# Patient Record
Sex: MALE | Race: WHITE | Employment: OTHER | URBAN - METROPOLITAN AREA
[De-identification: names, ages, dates, MRNs, and addresses within clinical notes are randomized per-mention and may not be internally consistent; named-entity substitution may affect disease eponyms.]

---

## 2017-01-01 ENCOUNTER — E-VISIT (OUTPATIENT)
Dept: FAMILY MEDICINE | Facility: CLINIC | Age: 82
End: 2017-01-01
Payer: COMMERCIAL

## 2017-01-01 ENCOUNTER — MYC MEDICAL ADVICE (OUTPATIENT)
Dept: FAMILY MEDICINE | Facility: CLINIC | Age: 82
End: 2017-01-01

## 2017-01-01 ENCOUNTER — OFFICE VISIT (OUTPATIENT)
Dept: OTHER | Facility: CLINIC | Age: 82
End: 2017-01-01
Attending: SURGERY
Payer: MEDICARE

## 2017-01-01 ENCOUNTER — PRE VISIT (OUTPATIENT)
Dept: UROLOGY | Facility: CLINIC | Age: 82
End: 2017-01-01

## 2017-01-01 ENCOUNTER — HOSPITAL ENCOUNTER (EMERGENCY)
Facility: CLINIC | Age: 82
Discharge: HOME | End: 2017-12-12
Attending: EMERGENCY MEDICINE | Admitting: EMERGENCY MEDICINE
Payer: MEDICARE

## 2017-01-01 ENCOUNTER — TELEPHONE (OUTPATIENT)
Dept: PHARMACY | Facility: CLINIC | Age: 82
End: 2017-01-01

## 2017-01-01 ENCOUNTER — OFFICE VISIT (OUTPATIENT)
Dept: SURGERY | Facility: CLINIC | Age: 82
End: 2017-01-01

## 2017-01-01 ENCOUNTER — OFFICE VISIT (OUTPATIENT)
Dept: URGENT CARE | Facility: URGENT CARE | Age: 82
End: 2017-01-01
Payer: COMMERCIAL

## 2017-01-01 ENCOUNTER — HOSPITAL ENCOUNTER (OUTPATIENT)
Facility: CLINIC | Age: 82
Discharge: HOME | End: 2017-12-08
Attending: UROLOGY | Admitting: UROLOGY
Payer: MEDICARE

## 2017-01-01 ENCOUNTER — HOSPITAL ENCOUNTER (OUTPATIENT)
Dept: ULTRASOUND IMAGING | Facility: CLINIC | Age: 82
Discharge: HOME | End: 2017-06-06
Attending: SURGERY | Admitting: SURGERY
Payer: MEDICARE

## 2017-01-01 ENCOUNTER — TELEPHONE (OUTPATIENT)
Dept: CARDIOLOGY | Facility: CLINIC | Age: 82
End: 2017-01-01

## 2017-01-01 ENCOUNTER — MYC MEDICAL ADVICE (OUTPATIENT)
Dept: CARDIOLOGY | Facility: CLINIC | Age: 82
End: 2017-01-01

## 2017-01-01 ENCOUNTER — HOSPITAL ENCOUNTER (EMERGENCY)
Facility: CLINIC | Age: 82
Discharge: HOME | End: 2017-12-19
Attending: EMERGENCY MEDICINE | Admitting: EMERGENCY MEDICINE
Payer: MEDICARE

## 2017-01-01 ENCOUNTER — CARE COORDINATION (OUTPATIENT)
Dept: ONCOLOGY | Facility: CLINIC | Age: 82
End: 2017-01-01

## 2017-01-01 ENCOUNTER — OFFICE VISIT (OUTPATIENT)
Dept: FAMILY MEDICINE | Facility: CLINIC | Age: 82
End: 2017-01-01
Payer: COMMERCIAL

## 2017-01-01 ENCOUNTER — RADIANT APPOINTMENT (OUTPATIENT)
Dept: GENERAL RADIOLOGY | Facility: CLINIC | Age: 82
End: 2017-01-01
Attending: NURSE PRACTITIONER
Payer: COMMERCIAL

## 2017-01-01 ENCOUNTER — ANESTHESIA EVENT (OUTPATIENT)
Dept: SURGERY | Facility: CLINIC | Age: 82
End: 2017-01-01
Payer: MEDICARE

## 2017-01-01 ENCOUNTER — ONCOLOGY VISIT (OUTPATIENT)
Dept: ONCOLOGY | Facility: CLINIC | Age: 82
End: 2017-01-01
Attending: INTERNAL MEDICINE
Payer: COMMERCIAL

## 2017-01-01 ENCOUNTER — TELEPHONE (OUTPATIENT)
Dept: PHARMACY | Facility: OTHER | Age: 82
End: 2017-01-01

## 2017-01-01 ENCOUNTER — CARE COORDINATION (OUTPATIENT)
Dept: UROLOGY | Facility: CLINIC | Age: 82
End: 2017-01-01

## 2017-01-01 ENCOUNTER — APPOINTMENT (OUTPATIENT)
Dept: CARDIOLOGY | Facility: CLINIC | Age: 82
DRG: 698 | End: 2017-01-01
Payer: MEDICARE

## 2017-01-01 ENCOUNTER — TELEPHONE (OUTPATIENT)
Dept: GENERAL RADIOLOGY | Facility: CLINIC | Age: 82
End: 2017-01-01

## 2017-01-01 ENCOUNTER — HOSPITAL ENCOUNTER (INPATIENT)
Facility: CLINIC | Age: 82
LOS: 2 days | DRG: 698 | End: 2017-12-22
Attending: EMERGENCY MEDICINE | Admitting: UROLOGY
Payer: MEDICARE

## 2017-01-01 ENCOUNTER — OFFICE VISIT (OUTPATIENT)
Dept: CARDIOLOGY | Facility: CLINIC | Age: 82
End: 2017-01-01
Attending: NURSE PRACTITIONER
Payer: COMMERCIAL

## 2017-01-01 ENCOUNTER — ALLIED HEALTH/NURSE VISIT (OUTPATIENT)
Dept: UROLOGY | Facility: CLINIC | Age: 82
End: 2017-01-01

## 2017-01-01 ENCOUNTER — OFFICE VISIT (OUTPATIENT)
Dept: CARDIOLOGY | Facility: CLINIC | Age: 82
End: 2017-01-01
Attending: INTERNAL MEDICINE
Payer: COMMERCIAL

## 2017-01-01 ENCOUNTER — TRANSFERRED RECORDS (OUTPATIENT)
Dept: HEALTH INFORMATION MANAGEMENT | Facility: CLINIC | Age: 82
End: 2017-01-01

## 2017-01-01 ENCOUNTER — OFFICE VISIT (OUTPATIENT)
Dept: UROLOGY | Facility: CLINIC | Age: 82
End: 2017-01-01

## 2017-01-01 ENCOUNTER — MEDICAL CORRESPONDENCE (OUTPATIENT)
Dept: CARDIOLOGY | Facility: CLINIC | Age: 82
End: 2017-01-01

## 2017-01-01 ENCOUNTER — MYC MEDICAL ADVICE (OUTPATIENT)
Dept: UROLOGY | Facility: CLINIC | Age: 82
End: 2017-01-01

## 2017-01-01 ENCOUNTER — SURGERY (OUTPATIENT)
Age: 82
End: 2017-01-01

## 2017-01-01 ENCOUNTER — ANESTHESIA (OUTPATIENT)
Dept: SURGERY | Facility: CLINIC | Age: 82
End: 2017-01-01
Payer: MEDICARE

## 2017-01-01 ENCOUNTER — ALLIED HEALTH/NURSE VISIT (OUTPATIENT)
Dept: SURGERY | Facility: CLINIC | Age: 82
End: 2017-01-01

## 2017-01-01 VITALS
SYSTOLIC BLOOD PRESSURE: 136 MMHG | DIASTOLIC BLOOD PRESSURE: 72 MMHG | BODY MASS INDEX: 22.88 KG/M2 | HEART RATE: 72 BPM | WEIGHT: 151 LBS | HEIGHT: 68 IN

## 2017-01-01 VITALS
HEIGHT: 68 IN | DIASTOLIC BLOOD PRESSURE: 53 MMHG | OXYGEN SATURATION: 95 % | SYSTOLIC BLOOD PRESSURE: 75 MMHG | WEIGHT: 152.4 LBS | BODY MASS INDEX: 23.1 KG/M2 | TEMPERATURE: 96.8 F | RESPIRATION RATE: 18 BRPM | HEART RATE: 86 BPM

## 2017-01-01 VITALS
SYSTOLIC BLOOD PRESSURE: 124 MMHG | HEART RATE: 72 BPM | WEIGHT: 161.5 LBS | DIASTOLIC BLOOD PRESSURE: 60 MMHG | HEIGHT: 68 IN | BODY MASS INDEX: 24.48 KG/M2

## 2017-01-01 VITALS
HEART RATE: 66 BPM | WEIGHT: 159 LBS | BODY MASS INDEX: 24.18 KG/M2 | SYSTOLIC BLOOD PRESSURE: 108 MMHG | DIASTOLIC BLOOD PRESSURE: 51 MMHG

## 2017-01-01 VITALS
DIASTOLIC BLOOD PRESSURE: 52 MMHG | WEIGHT: 155 LBS | SYSTOLIC BLOOD PRESSURE: 89 MMHG | HEIGHT: 68 IN | HEART RATE: 83 BPM | BODY MASS INDEX: 23.49 KG/M2

## 2017-01-01 VITALS
BODY MASS INDEX: 24.39 KG/M2 | SYSTOLIC BLOOD PRESSURE: 147 MMHG | HEIGHT: 67 IN | DIASTOLIC BLOOD PRESSURE: 83 MMHG | WEIGHT: 155.4 LBS | HEART RATE: 67 BPM

## 2017-01-01 VITALS
SYSTOLIC BLOOD PRESSURE: 98 MMHG | DIASTOLIC BLOOD PRESSURE: 56 MMHG | TEMPERATURE: 97.8 F | WEIGHT: 152 LBS | BODY MASS INDEX: 23.11 KG/M2 | HEART RATE: 80 BPM | RESPIRATION RATE: 16 BRPM | OXYGEN SATURATION: 97 %

## 2017-01-01 VITALS
SYSTOLIC BLOOD PRESSURE: 150 MMHG | RESPIRATION RATE: 16 BRPM | DIASTOLIC BLOOD PRESSURE: 80 MMHG | OXYGEN SATURATION: 97 % | HEIGHT: 68 IN | TEMPERATURE: 98.1 F | HEART RATE: 85 BPM

## 2017-01-01 VITALS
TEMPERATURE: 97.8 F | RESPIRATION RATE: 18 BRPM | OXYGEN SATURATION: 96 % | WEIGHT: 155 LBS | SYSTOLIC BLOOD PRESSURE: 135 MMHG | BODY MASS INDEX: 23.49 KG/M2 | HEIGHT: 68 IN | DIASTOLIC BLOOD PRESSURE: 63 MMHG | HEART RATE: 107 BPM

## 2017-01-01 VITALS
SYSTOLIC BLOOD PRESSURE: 123 MMHG | TEMPERATURE: 98.3 F | BODY MASS INDEX: 23.56 KG/M2 | OXYGEN SATURATION: 88 % | HEART RATE: 74 BPM | RESPIRATION RATE: 16 BRPM | HEIGHT: 68 IN | DIASTOLIC BLOOD PRESSURE: 63 MMHG | WEIGHT: 155.42 LBS

## 2017-01-01 VITALS
BODY MASS INDEX: 24.25 KG/M2 | SYSTOLIC BLOOD PRESSURE: 142 MMHG | DIASTOLIC BLOOD PRESSURE: 70 MMHG | OXYGEN SATURATION: 96 % | TEMPERATURE: 98.8 F | HEART RATE: 101 BPM | WEIGHT: 160 LBS | HEIGHT: 68 IN

## 2017-01-01 VITALS
BODY MASS INDEX: 24.1 KG/M2 | TEMPERATURE: 97.2 F | SYSTOLIC BLOOD PRESSURE: 122 MMHG | HEART RATE: 88 BPM | OXYGEN SATURATION: 97 % | WEIGHT: 159 LBS | DIASTOLIC BLOOD PRESSURE: 66 MMHG | HEIGHT: 68 IN

## 2017-01-01 VITALS
HEIGHT: 68 IN | TEMPERATURE: 97.5 F | DIASTOLIC BLOOD PRESSURE: 57 MMHG | RESPIRATION RATE: 12 BRPM | SYSTOLIC BLOOD PRESSURE: 103 MMHG | HEART RATE: 68 BPM | OXYGEN SATURATION: 97 % | BODY MASS INDEX: 22.28 KG/M2 | WEIGHT: 147 LBS

## 2017-01-01 VITALS
OXYGEN SATURATION: 97 % | SYSTOLIC BLOOD PRESSURE: 116 MMHG | WEIGHT: 156.8 LBS | TEMPERATURE: 98.1 F | DIASTOLIC BLOOD PRESSURE: 68 MMHG | BODY MASS INDEX: 23.76 KG/M2 | HEART RATE: 75 BPM | HEIGHT: 68 IN | RESPIRATION RATE: 14 BRPM

## 2017-01-01 VITALS
OXYGEN SATURATION: 99 % | WEIGHT: 157 LBS | BODY MASS INDEX: 23.79 KG/M2 | HEART RATE: 101 BPM | RESPIRATION RATE: 15 BRPM | DIASTOLIC BLOOD PRESSURE: 69 MMHG | TEMPERATURE: 96.8 F | HEIGHT: 68 IN | SYSTOLIC BLOOD PRESSURE: 114 MMHG

## 2017-01-01 VITALS
TEMPERATURE: 97.5 F | OXYGEN SATURATION: 99 % | RESPIRATION RATE: 16 BRPM | BODY MASS INDEX: 24.43 KG/M2 | SYSTOLIC BLOOD PRESSURE: 116 MMHG | WEIGHT: 156 LBS | DIASTOLIC BLOOD PRESSURE: 66 MMHG | HEART RATE: 76 BPM

## 2017-01-01 VITALS
DIASTOLIC BLOOD PRESSURE: 48 MMHG | SYSTOLIC BLOOD PRESSURE: 110 MMHG | WEIGHT: 160.3 LBS | BODY MASS INDEX: 25.16 KG/M2 | HEART RATE: 70 BPM | HEIGHT: 67 IN

## 2017-01-01 DIAGNOSIS — C67.8 MALIGNANT NEOPLASM OF OVERLAPPING SITES OF BLADDER (H): Primary | ICD-10-CM

## 2017-01-01 DIAGNOSIS — I73.9 BILATERAL CLAUDICATION OF LOWER LIMB (H): ICD-10-CM

## 2017-01-01 DIAGNOSIS — Z98.890 HISTORY OF RIGHT-SIDED CAROTID ENDARTERECTOMY: Primary | ICD-10-CM

## 2017-01-01 DIAGNOSIS — E11.51 TYPE 2 DIABETES MELLITUS WITH PERIPHERAL VASCULAR DISEASE (H): Primary | ICD-10-CM

## 2017-01-01 DIAGNOSIS — M50.30 DDD (DEGENERATIVE DISC DISEASE), CERVICAL: ICD-10-CM

## 2017-01-01 DIAGNOSIS — E11.51 TYPE 2 DIABETES MELLITUS WITH PERIPHERAL VASCULAR DISEASE (H): ICD-10-CM

## 2017-01-01 DIAGNOSIS — R53.83 FATIGUE, UNSPECIFIED TYPE: ICD-10-CM

## 2017-01-01 DIAGNOSIS — G89.4 CHRONIC PAIN SYNDROME: ICD-10-CM

## 2017-01-01 DIAGNOSIS — J06.9 URI, ACUTE: ICD-10-CM

## 2017-01-01 DIAGNOSIS — I25.10 CORONARY ARTERY DISEASE DUE TO LIPID RICH PLAQUE: ICD-10-CM

## 2017-01-01 DIAGNOSIS — I65.23 CAROTID STENOSIS, BILATERAL: ICD-10-CM

## 2017-01-01 DIAGNOSIS — E11.9 TYPE 2 DIABETES MELLITUS WITHOUT COMPLICATION, WITHOUT LONG-TERM CURRENT USE OF INSULIN (H): ICD-10-CM

## 2017-01-01 DIAGNOSIS — J01.00 ACUTE MAXILLARY SINUSITIS, RECURRENCE NOT SPECIFIED: Primary | ICD-10-CM

## 2017-01-01 DIAGNOSIS — I48.20 CHRONIC ATRIAL FIBRILLATION (H): ICD-10-CM

## 2017-01-01 DIAGNOSIS — M25.50 PAIN IN JOINT, MULTIPLE SITES: ICD-10-CM

## 2017-01-01 DIAGNOSIS — J02.9 ACUTE PHARYNGITIS, UNSPECIFIED ETIOLOGY: Primary | ICD-10-CM

## 2017-01-01 DIAGNOSIS — C67.8 MALIGNANT NEOPLASM OF OVERLAPPING SITES OF BLADDER (H): ICD-10-CM

## 2017-01-01 DIAGNOSIS — Z01.818 PRE-OP EVALUATION: ICD-10-CM

## 2017-01-01 DIAGNOSIS — E03.8 SUBCLINICAL HYPOTHYROIDISM: ICD-10-CM

## 2017-01-01 DIAGNOSIS — I25.83 CORONARY ARTERY DISEASE DUE TO LIPID RICH PLAQUE: ICD-10-CM

## 2017-01-01 DIAGNOSIS — R31.0 GROSS HEMATURIA: ICD-10-CM

## 2017-01-01 DIAGNOSIS — J02.9 ACUTE PHARYNGITIS, UNSPECIFIED ETIOLOGY: ICD-10-CM

## 2017-01-01 DIAGNOSIS — I65.22 LEFT CAROTID STENOSIS: ICD-10-CM

## 2017-01-01 DIAGNOSIS — G89.4 CHRONIC PAIN SYNDROME: Primary | ICD-10-CM

## 2017-01-01 DIAGNOSIS — F41.9 ANXIETY: ICD-10-CM

## 2017-01-01 DIAGNOSIS — I10 HYPERTENSION GOAL BP (BLOOD PRESSURE) < 140/90: ICD-10-CM

## 2017-01-01 DIAGNOSIS — E11.42 DIABETIC POLYNEUROPATHY ASSOCIATED WITH TYPE 2 DIABETES MELLITUS (H): ICD-10-CM

## 2017-01-01 DIAGNOSIS — I25.83 CORONARY ARTERY DISEASE DUE TO LIPID RICH PLAQUE: Primary | ICD-10-CM

## 2017-01-01 DIAGNOSIS — E78.5 HYPERLIPIDEMIA LDL GOAL <70: ICD-10-CM

## 2017-01-01 DIAGNOSIS — Z78.9 STATIN INTOLERANCE: Primary | ICD-10-CM

## 2017-01-01 DIAGNOSIS — C67.9 MALIGNANT NEOPLASM OF URINARY BLADDER, UNSPECIFIED SITE (H): ICD-10-CM

## 2017-01-01 DIAGNOSIS — I25.118 CORONARY ARTERY DISEASE OF NATIVE HEART WITH STABLE ANGINA PECTORIS, UNSPECIFIED VESSEL OR LESION TYPE (H): ICD-10-CM

## 2017-01-01 DIAGNOSIS — I10 HYPERTENSION GOAL BP (BLOOD PRESSURE) < 140/90: Primary | ICD-10-CM

## 2017-01-01 DIAGNOSIS — E11.42 TYPE 2 DIABETES MELLITUS WITH DIABETIC POLYNEUROPATHY, WITHOUT LONG-TERM CURRENT USE OF INSULIN (H): ICD-10-CM

## 2017-01-01 DIAGNOSIS — N32.0 BLADDER OUTLET OBSTRUCTION: ICD-10-CM

## 2017-01-01 DIAGNOSIS — I65.29 CAROTID STENOSIS: Primary | ICD-10-CM

## 2017-01-01 DIAGNOSIS — R33.9 URINARY RETENTION: ICD-10-CM

## 2017-01-01 DIAGNOSIS — I25.10 CORONARY ARTERY DISEASE DUE TO LIPID RICH PLAQUE: Primary | ICD-10-CM

## 2017-01-01 DIAGNOSIS — R33.9 URINARY RETENTION: Primary | ICD-10-CM

## 2017-01-01 DIAGNOSIS — I25.10 CORONARY ARTERY DISEASE INVOLVING NATIVE HEART, ANGINA PRESENCE UNSPECIFIED, UNSPECIFIED VESSEL OR LESION TYPE: ICD-10-CM

## 2017-01-01 DIAGNOSIS — Z98.890 HISTORY OF BIOPSY OF BLADDER: ICD-10-CM

## 2017-01-01 DIAGNOSIS — Z85.46 HISTORY OF PROSTATE CANCER: ICD-10-CM

## 2017-01-01 DIAGNOSIS — R63.4 LOSS OF WEIGHT: ICD-10-CM

## 2017-01-01 DIAGNOSIS — I25.2 HISTORY OF MI (MYOCARDIAL INFARCTION): ICD-10-CM

## 2017-01-01 DIAGNOSIS — N32.89 BLADDER SPASMS: ICD-10-CM

## 2017-01-01 LAB
ABO + RH BLD: NORMAL
ABO + RH BLD: NORMAL
ALBUMIN SERPL-MCNC: 2.5 G/DL (ref 3.4–5)
ALBUMIN SERPL-MCNC: 3.7 G/DL (ref 3.4–5)
ALBUMIN UR-MCNC: 100 MG/DL
ALBUMIN UR-MCNC: 30 MG/DL
ALBUMIN UR-MCNC: NEGATIVE MG/DL
ALP SERPL-CCNC: 82 U/L (ref 40–150)
ALP SERPL-CCNC: 92 U/L (ref 40–150)
ALT SERPL W P-5'-P-CCNC: 18 U/L (ref 0–70)
ALT SERPL W P-5'-P-CCNC: 20 U/L (ref 0–70)
ALT SERPL W P-5'-P-CCNC: 25 U/L (ref 0–70)
ANION GAP SERPL CALCULATED.3IONS-SCNC: 11 MMOL/L (ref 3–14)
ANION GAP SERPL CALCULATED.3IONS-SCNC: 14 MMOL/L (ref 3–14)
ANION GAP SERPL CALCULATED.3IONS-SCNC: 18 MMOL/L (ref 3–14)
ANION GAP SERPL CALCULATED.3IONS-SCNC: 6 MMOL/L (ref 3–14)
ANION GAP SERPL CALCULATED.3IONS-SCNC: 6 MMOL/L (ref 3–14)
ANION GAP SERPL CALCULATED.3IONS-SCNC: 7 MMOL/L (ref 3–14)
ANION GAP SERPL CALCULATED.3IONS-SCNC: 9 MMOL/L (ref 3–14)
ANION GAP SERPL CALCULATED.3IONS-SCNC: 9 MMOL/L (ref 3–14)
APPEARANCE UR: ABNORMAL
APPEARANCE UR: ABNORMAL
APPEARANCE UR: CLEAR
AST SERPL W P-5'-P-CCNC: 14 U/L (ref 0–45)
AST SERPL W P-5'-P-CCNC: 39 U/L (ref 0–45)
BACTERIA #/AREA URNS HPF: ABNORMAL /HPF
BACTERIA SPEC CULT: ABNORMAL
BACTERIA SPEC CULT: NORMAL
BASOPHILS # BLD AUTO: 0 10E9/L (ref 0–0.2)
BASOPHILS # BLD AUTO: 0 10E9/L (ref 0–0.2)
BASOPHILS NFR BLD AUTO: 0.2 %
BASOPHILS NFR BLD AUTO: 0.2 %
BILIRUB SERPL-MCNC: 0.3 MG/DL (ref 0.2–1.3)
BILIRUB SERPL-MCNC: 0.5 MG/DL (ref 0.2–1.3)
BILIRUB UR QL STRIP: NEGATIVE
BLD GP AB SCN SERPL QL: NORMAL
BLD PROD TYP BPU: NORMAL
BLD PROD TYP BPU: NORMAL
BLD UNIT ID BPU: 0
BLOOD BANK CMNT PATIENT-IMP: NORMAL
BLOOD PRODUCT CODE: NORMAL
BPU ID: NORMAL
BUN SERPL-MCNC: 15 MG/DL (ref 7–30)
BUN SERPL-MCNC: 19 MG/DL (ref 7–30)
BUN SERPL-MCNC: 20 MG/DL (ref 7–30)
BUN SERPL-MCNC: 21 MG/DL (ref 7–30)
BUN SERPL-MCNC: 21 MG/DL (ref 7–30)
BUN SERPL-MCNC: 23 MG/DL (ref 7–30)
BUN SERPL-MCNC: 23 MG/DL (ref 7–30)
BUN SERPL-MCNC: 24 MG/DL (ref 7–30)
CALCIUM SERPL-MCNC: 8.1 MG/DL (ref 8.5–10.1)
CALCIUM SERPL-MCNC: 8.3 MG/DL (ref 8.5–10.1)
CALCIUM SERPL-MCNC: 8.4 MG/DL (ref 8.5–10.1)
CALCIUM SERPL-MCNC: 8.6 MG/DL (ref 8.5–10.1)
CALCIUM SERPL-MCNC: 9.2 MG/DL (ref 8.5–10.1)
CALCIUM SERPL-MCNC: 9.3 MG/DL (ref 8.5–10.1)
CALCIUM SERPL-MCNC: 9.3 MG/DL (ref 8.5–10.1)
CALCIUM SERPL-MCNC: 9.4 MG/DL (ref 8.5–10.1)
CHLORIDE SERPL-SCNC: 100 MMOL/L (ref 94–109)
CHLORIDE SERPL-SCNC: 101 MMOL/L (ref 94–109)
CHLORIDE SERPL-SCNC: 102 MMOL/L (ref 94–109)
CHLORIDE SERPL-SCNC: 106 MMOL/L (ref 94–109)
CHLORIDE SERPL-SCNC: 107 MMOL/L (ref 94–109)
CHLORIDE SERPL-SCNC: 98 MMOL/L (ref 94–109)
CHOLEST SERPL-MCNC: 158 MG/DL
CHOLEST SERPL-MCNC: 158 MG/DL
CO2 SERPL-SCNC: 14 MMOL/L (ref 20–32)
CO2 SERPL-SCNC: 21 MMOL/L (ref 20–32)
CO2 SERPL-SCNC: 21 MMOL/L (ref 20–32)
CO2 SERPL-SCNC: 22 MMOL/L (ref 20–32)
CO2 SERPL-SCNC: 26 MMOL/L (ref 20–32)
CO2 SERPL-SCNC: 26 MMOL/L (ref 20–32)
CO2 SERPL-SCNC: 28 MMOL/L (ref 20–32)
CO2 SERPL-SCNC: 29 MMOL/L (ref 20–32)
COLOR UR AUTO: ABNORMAL
COLOR UR AUTO: ABNORMAL
COLOR UR AUTO: YELLOW
COPATH REPORT: NORMAL
CREAT SERPL-MCNC: 1.1 MG/DL (ref 0.66–1.25)
CREAT SERPL-MCNC: 1.15 MG/DL (ref 0.66–1.25)
CREAT SERPL-MCNC: 1.16 MG/DL (ref 0.66–1.25)
CREAT SERPL-MCNC: 1.19 MG/DL (ref 0.66–1.25)
CREAT SERPL-MCNC: 1.24 MG/DL (ref 0.66–1.25)
CREAT SERPL-MCNC: 1.31 MG/DL (ref 0.66–1.25)
CREAT SERPL-MCNC: 1.32 MG/DL (ref 0.66–1.25)
CREAT SERPL-MCNC: 1.36 MG/DL (ref 0.66–1.25)
CREAT UR-MCNC: 79 MG/DL
DEPRECATED S PYO AG THROAT QL EIA: NORMAL
DIFFERENTIAL METHOD BLD: ABNORMAL
DIFFERENTIAL METHOD BLD: ABNORMAL
EOSINOPHIL # BLD AUTO: 0.1 10E9/L (ref 0–0.7)
EOSINOPHIL # BLD AUTO: 0.2 10E9/L (ref 0–0.7)
EOSINOPHIL NFR BLD AUTO: 0.5 %
EOSINOPHIL NFR BLD AUTO: 1.4 %
ERYTHROCYTE [DISTWIDTH] IN BLOOD BY AUTOMATED COUNT: 13.2 % (ref 10–15)
ERYTHROCYTE [DISTWIDTH] IN BLOOD BY AUTOMATED COUNT: 13.8 % (ref 10–15)
ERYTHROCYTE [DISTWIDTH] IN BLOOD BY AUTOMATED COUNT: 14.2 % (ref 10–15)
ERYTHROCYTE [DISTWIDTH] IN BLOOD BY AUTOMATED COUNT: 14.4 % (ref 10–15)
FLUAV+FLUBV AG SPEC QL: NORMAL
FLUAV+FLUBV AG SPEC QL: NORMAL
GFR SERPL CREATININE-BSD FRML MDRD: 50 ML/MIN/1.7M2
GFR SERPL CREATININE-BSD FRML MDRD: 51 ML/MIN/1.7M2
GFR SERPL CREATININE-BSD FRML MDRD: 52 ML/MIN/1.7M2
GFR SERPL CREATININE-BSD FRML MDRD: 55 ML/MIN/1.7M2
GFR SERPL CREATININE-BSD FRML MDRD: 58 ML/MIN/1.7M2
GFR SERPL CREATININE-BSD FRML MDRD: 60 ML/MIN/1.7M2
GFR SERPL CREATININE-BSD FRML MDRD: 60 ML/MIN/1.7M2
GFR SERPL CREATININE-BSD FRML MDRD: 63 ML/MIN/1.7M2
GLUCOSE BLDC GLUCOMTR-MCNC: 200 MG/DL (ref 70–99)
GLUCOSE SERPL-MCNC: 102 MG/DL (ref 70–99)
GLUCOSE SERPL-MCNC: 143 MG/DL (ref 70–99)
GLUCOSE SERPL-MCNC: 184 MG/DL (ref 70–99)
GLUCOSE SERPL-MCNC: 186 MG/DL (ref 70–99)
GLUCOSE SERPL-MCNC: 214 MG/DL (ref 70–99)
GLUCOSE SERPL-MCNC: 233 MG/DL (ref 70–99)
GLUCOSE SERPL-MCNC: 254 MG/DL (ref 70–99)
GLUCOSE SERPL-MCNC: 259 MG/DL (ref 70–99)
GLUCOSE UR STRIP-MCNC: 100 MG/DL
GLUCOSE UR STRIP-MCNC: NEGATIVE MG/DL
GLUCOSE UR STRIP-MCNC: NEGATIVE MG/DL
HBA1C MFR BLD: 7 % (ref 4.3–6)
HCT VFR BLD AUTO: 26.6 % (ref 40–53)
HCT VFR BLD AUTO: 27.2 % (ref 40–53)
HCT VFR BLD AUTO: 28 % (ref 40–53)
HCT VFR BLD AUTO: 30 % (ref 40–53)
HCT VFR BLD AUTO: 32.2 % (ref 40–53)
HCT VFR BLD AUTO: 33.1 % (ref 40–53)
HCT VFR BLD AUTO: 38.3 % (ref 40–53)
HCT VFR BLD AUTO: 39.8 % (ref 40–53)
HDLC SERPL-MCNC: 52 MG/DL
HDLC SERPL-MCNC: 56 MG/DL
HGB BLD-MCNC: 10.2 G/DL (ref 13.3–17.7)
HGB BLD-MCNC: 10.6 G/DL (ref 13.3–17.7)
HGB BLD-MCNC: 12.1 G/DL (ref 13.3–17.7)
HGB BLD-MCNC: 12.3 G/DL (ref 13.3–17.7)
HGB BLD-MCNC: 8.4 G/DL (ref 13.3–17.7)
HGB BLD-MCNC: 8.6 G/DL (ref 13.3–17.7)
HGB BLD-MCNC: 9 G/DL (ref 13.3–17.7)
HGB BLD-MCNC: 9.3 G/DL (ref 13.3–17.7)
HGB BLD-MCNC: 9.4 G/DL (ref 13.3–17.7)
HGB UR QL STRIP: ABNORMAL
HGB UR QL STRIP: ABNORMAL
HGB UR QL STRIP: NEGATIVE
IMM GRANULOCYTES # BLD: 0.1 10E9/L (ref 0–0.4)
IMM GRANULOCYTES NFR BLD: 0.3 %
INR PPP: 1.2 (ref 0.86–1.14)
INTERPRETATION ECG - MUSE: NORMAL
KCT BLD-ACNC: 111 SEC (ref 105–167)
KETONES UR STRIP-MCNC: NEGATIVE MG/DL
LACTATE BLD-SCNC: 1.6 MMOL/L (ref 0.7–2)
LACTATE BLD-SCNC: 3.8 MMOL/L (ref 0.7–2)
LACTATE BLD-SCNC: 4.1 MMOL/L (ref 0.7–2)
LDLC SERPL CALC-MCNC: 55 MG/DL
LDLC SERPL CALC-MCNC: 63 MG/DL
LEUKOCYTE ESTERASE UR QL STRIP: ABNORMAL
LEUKOCYTE ESTERASE UR QL STRIP: NEGATIVE
LEUKOCYTE ESTERASE UR QL STRIP: NEGATIVE
LMWH PPP CHRO-ACNC: 0.44 IU/ML
LMWH PPP CHRO-ACNC: 0.76 IU/ML
LYMPHOCYTES # BLD AUTO: 2.4 10E9/L (ref 0.8–5.3)
LYMPHOCYTES # BLD AUTO: 3.1 10E9/L (ref 0.8–5.3)
LYMPHOCYTES NFR BLD AUTO: 15.1 %
LYMPHOCYTES NFR BLD AUTO: 16.3 %
Lab: ABNORMAL
MAGNESIUM SERPL-MCNC: 1.3 MG/DL (ref 1.6–2.3)
MCH RBC QN AUTO: 27.6 PG (ref 26.5–33)
MCH RBC QN AUTO: 27.9 PG (ref 26.5–33)
MCH RBC QN AUTO: 27.9 PG (ref 26.5–33)
MCH RBC QN AUTO: 28 PG (ref 26.5–33)
MCH RBC QN AUTO: 28 PG (ref 26.5–33)
MCH RBC QN AUTO: 28.3 PG (ref 26.5–33)
MCH RBC QN AUTO: 28.4 PG (ref 26.5–33)
MCH RBC QN AUTO: 28.5 PG (ref 26.5–33)
MCHC RBC AUTO-ENTMCNC: 30.9 G/DL (ref 31.5–36.5)
MCHC RBC AUTO-ENTMCNC: 31.3 G/DL (ref 31.5–36.5)
MCHC RBC AUTO-ENTMCNC: 31.6 G/DL (ref 31.5–36.5)
MCHC RBC AUTO-ENTMCNC: 31.7 G/DL (ref 31.5–36.5)
MCHC RBC AUTO-ENTMCNC: 32 G/DL (ref 31.5–36.5)
MCHC RBC AUTO-ENTMCNC: 32.1 G/DL (ref 31.5–36.5)
MCV RBC AUTO: 87 FL (ref 78–100)
MCV RBC AUTO: 87 FL (ref 78–100)
MCV RBC AUTO: 88 FL (ref 78–100)
MCV RBC AUTO: 88 FL (ref 78–100)
MCV RBC AUTO: 89 FL (ref 78–100)
MCV RBC AUTO: 90 FL (ref 78–100)
MCV RBC AUTO: 91 FL (ref 78–100)
MCV RBC AUTO: 92 FL (ref 78–100)
MICRO REPORT STATUS: NORMAL
MICRO REPORT STATUS: NORMAL
MICROALBUMIN UR-MCNC: 6 MG/L
MICROALBUMIN/CREAT UR: 8.07 MG/G CR (ref 0–17)
MONOCYTES # BLD AUTO: 1.6 10E9/L (ref 0–1.3)
MONOCYTES # BLD AUTO: 2.1 10E9/L (ref 0–1.3)
MONOCYTES NFR BLD AUTO: 12.8 %
MONOCYTES NFR BLD AUTO: 8.4 %
NEUTROPHILS # BLD AUTO: 11.4 10E9/L (ref 1.6–8.3)
NEUTROPHILS # BLD AUTO: 13.9 10E9/L (ref 1.6–8.3)
NEUTROPHILS NFR BLD AUTO: 70.5 %
NEUTROPHILS NFR BLD AUTO: 74.3 %
NITRATE UR QL: NEGATIVE
NONHDLC SERPL-MCNC: 102 MG/DL
NONHDLC SERPL-MCNC: 106 MG/DL
NRBC # BLD AUTO: 0 10*3/UL
NRBC BLD AUTO-RTO: 0 /100
NUM BPU REQUESTED: 1
PH UR STRIP: 5 PH (ref 5–7)
PH UR STRIP: 5.5 PH (ref 5–7)
PH UR STRIP: 6 PH (ref 5–7)
PLATELET # BLD AUTO: 270 10E9/L (ref 150–450)
PLATELET # BLD AUTO: 306 10E9/L (ref 150–450)
PLATELET # BLD AUTO: 308 10E9/L (ref 150–450)
PLATELET # BLD AUTO: 317 10E9/L (ref 150–450)
PLATELET # BLD AUTO: 321 10E9/L (ref 150–450)
PLATELET # BLD AUTO: 340 10E9/L (ref 150–450)
PLATELET # BLD AUTO: 389 10E9/L (ref 150–450)
PLATELET # BLD AUTO: 395 10E9/L (ref 150–450)
POTASSIUM SERPL-SCNC: 4 MMOL/L (ref 3.4–5.3)
POTASSIUM SERPL-SCNC: 4.3 MMOL/L (ref 3.4–5.3)
POTASSIUM SERPL-SCNC: 4.5 MMOL/L (ref 3.4–5.3)
POTASSIUM SERPL-SCNC: 4.6 MMOL/L (ref 3.4–5.3)
POTASSIUM SERPL-SCNC: 4.6 MMOL/L (ref 3.4–5.3)
POTASSIUM SERPL-SCNC: 5 MMOL/L (ref 3.4–5.3)
PROT SERPL-MCNC: 5.6 G/DL (ref 6.8–8.8)
PROT SERPL-MCNC: 6.8 G/DL (ref 6.8–8.8)
RBC # BLD AUTO: 3 10E12/L (ref 4.4–5.9)
RBC # BLD AUTO: 3.12 10E12/L (ref 4.4–5.9)
RBC # BLD AUTO: 3.23 10E12/L (ref 4.4–5.9)
RBC # BLD AUTO: 3.31 10E12/L (ref 4.4–5.9)
RBC # BLD AUTO: 3.66 10E12/L (ref 4.4–5.9)
RBC # BLD AUTO: 3.78 10E12/L (ref 4.4–5.9)
RBC # BLD AUTO: 4.27 10E12/L (ref 4.4–5.9)
RBC # BLD AUTO: 4.31 10E12/L (ref 4.4–5.9)
RBC #/AREA URNS AUTO: >100 /HPF
RBC #/AREA URNS AUTO: >182 /HPF (ref 0–2)
SODIUM SERPL-SCNC: 133 MMOL/L (ref 133–144)
SODIUM SERPL-SCNC: 133 MMOL/L (ref 133–144)
SODIUM SERPL-SCNC: 134 MMOL/L (ref 133–144)
SODIUM SERPL-SCNC: 136 MMOL/L (ref 133–144)
SODIUM SERPL-SCNC: 137 MMOL/L (ref 133–144)
SODIUM SERPL-SCNC: 138 MMOL/L (ref 133–144)
SOURCE: ABNORMAL
SOURCE: ABNORMAL
SOURCE: NORMAL
SP GR UR STRIP: 1 (ref 1–1.03)
SP GR UR STRIP: 1.02 (ref 1–1.03)
SP GR UR STRIP: 1.02 (ref 1–1.03)
SPECIMEN EXP DATE BLD: NORMAL
SPECIMEN SOURCE: ABNORMAL
SPECIMEN SOURCE: NORMAL
TRANSFUSION STATUS PATIENT QL: NORMAL
TRANSFUSION STATUS PATIENT QL: NORMAL
TRIGL SERPL-MCNC: 217 MG/DL
TRIGL SERPL-MCNC: 233 MG/DL
TROPONIN I SERPL-MCNC: 1.49 UG/L (ref 0–0.04)
TROPONIN I SERPL-MCNC: 1.67 UG/L (ref 0–0.04)
TROPONIN I SERPL-MCNC: 2.13 UG/L (ref 0–0.04)
TROPONIN I SERPL-MCNC: 2.57 UG/L (ref 0–0.04)
TROPONIN I SERPL-MCNC: 2.71 UG/L (ref 0–0.04)
TSH SERPL DL<=0.005 MIU/L-ACNC: 1.59 MU/L (ref 0.4–4)
UROBILINOGEN UR STRIP-ACNC: 0.2 EU/DL (ref 0.2–1)
UROBILINOGEN UR STRIP-MCNC: 0 MG/DL (ref 0–2)
UROBILINOGEN UR STRIP-MCNC: NORMAL MG/DL (ref 0–2)
WBC # BLD AUTO: 10.9 10E9/L (ref 4–11)
WBC # BLD AUTO: 14.2 10E9/L (ref 4–11)
WBC # BLD AUTO: 14.7 10E9/L (ref 4–11)
WBC # BLD AUTO: 15.1 10E9/L (ref 4–11)
WBC # BLD AUTO: 16.1 10E9/L (ref 4–11)
WBC # BLD AUTO: 16.7 10E9/L (ref 4–11)
WBC # BLD AUTO: 17.7 10E9/L (ref 4–11)
WBC # BLD AUTO: 18.8 10E9/L (ref 4–11)
WBC #/AREA URNS AUTO: 0 /HPF (ref 0–2)
WBC #/AREA URNS AUTO: ABNORMAL /HPF

## 2017-01-01 PROCEDURE — 71000014 ZZH RECOVERY PHASE 1 LEVEL 2 FIRST HR: Performed by: UROLOGY

## 2017-01-01 PROCEDURE — 82962 GLUCOSE BLOOD TEST: CPT

## 2017-01-01 PROCEDURE — 99213 OFFICE O/P EST LOW 20 MIN: CPT | Performed by: NURSE PRACTITIONER

## 2017-01-01 PROCEDURE — 37000009 ZZH ANESTHESIA TECHNICAL FEE, EACH ADDTL 15 MIN: Performed by: UROLOGY

## 2017-01-01 PROCEDURE — 80048 BASIC METABOLIC PNL TOTAL CA: CPT | Performed by: EMERGENCY MEDICINE

## 2017-01-01 PROCEDURE — A9270 NON-COVERED ITEM OR SERVICE: HCPCS | Performed by: EMERGENCY MEDICINE

## 2017-01-01 PROCEDURE — 86900 BLOOD TYPING SEROLOGIC ABO: CPT | Performed by: UROLOGY

## 2017-01-01 PROCEDURE — 25000132 ZZH RX MED GY IP 250 OP 250 PS 637: Mod: GY | Performed by: PHYSICIAN ASSISTANT

## 2017-01-01 PROCEDURE — A9270 NON-COVERED ITEM OR SERVICE: HCPCS | Mod: GY | Performed by: STUDENT IN AN ORGANIZED HEALTH CARE EDUCATION/TRAINING PROGRAM

## 2017-01-01 PROCEDURE — 82803 BLOOD GASES ANY COMBINATION: CPT | Performed by: UROLOGY

## 2017-01-01 PROCEDURE — A9270 NON-COVERED ITEM OR SERVICE: HCPCS | Performed by: UROLOGY

## 2017-01-01 PROCEDURE — 93005 ELECTROCARDIOGRAM TRACING: CPT

## 2017-01-01 PROCEDURE — 25000125 ZZHC RX 250: Performed by: RESIDENTIAL TREATMENT FACILITY, PHYSICAL DISABILITIES

## 2017-01-01 PROCEDURE — 25000128 H RX IP 250 OP 636: Performed by: RESIDENTIAL TREATMENT FACILITY, PHYSICAL DISABILITIES

## 2017-01-01 PROCEDURE — 36415 COLL VENOUS BLD VENIPUNCTURE: CPT | Performed by: PHYSICIAN ASSISTANT

## 2017-01-01 PROCEDURE — 80061 LIPID PANEL: CPT | Performed by: FAMILY MEDICINE

## 2017-01-01 PROCEDURE — 85025 COMPLETE CBC W/AUTO DIFF WBC: CPT | Performed by: EMERGENCY MEDICINE

## 2017-01-01 PROCEDURE — 87186 SC STD MICRODIL/AGAR DIL: CPT | Performed by: EMERGENCY MEDICINE

## 2017-01-01 PROCEDURE — 99213 OFFICE O/P EST LOW 20 MIN: CPT | Mod: ZP | Performed by: SURGERY

## 2017-01-01 PROCEDURE — 87880 STREP A ASSAY W/OPTIC: CPT | Performed by: NURSE PRACTITIONER

## 2017-01-01 PROCEDURE — C1769 GUIDE WIRE: HCPCS | Performed by: UROLOGY

## 2017-01-01 PROCEDURE — 36415 COLL VENOUS BLD VENIPUNCTURE: CPT | Performed by: FAMILY MEDICINE

## 2017-01-01 PROCEDURE — 99283 EMERGENCY DEPT VISIT LOW MDM: CPT

## 2017-01-01 PROCEDURE — 25000128 H RX IP 250 OP 636: Performed by: UROLOGY

## 2017-01-01 PROCEDURE — 99285 EMERGENCY DEPT VISIT HI MDM: CPT | Mod: Z6 | Performed by: EMERGENCY MEDICINE

## 2017-01-01 PROCEDURE — 36415 COLL VENOUS BLD VENIPUNCTURE: CPT | Performed by: STUDENT IN AN ORGANIZED HEALTH CARE EDUCATION/TRAINING PROGRAM

## 2017-01-01 PROCEDURE — 25000128 H RX IP 250 OP 636: Performed by: EMERGENCY MEDICINE

## 2017-01-01 PROCEDURE — 27210742 ZZH CATH CR1

## 2017-01-01 PROCEDURE — 25000128 H RX IP 250 OP 636: Performed by: PHYSICIAN ASSISTANT

## 2017-01-01 PROCEDURE — 84132 ASSAY OF SERUM POTASSIUM: CPT | Performed by: UROLOGY

## 2017-01-01 PROCEDURE — 88305 TISSUE EXAM BY PATHOLOGIST: CPT | Performed by: UROLOGY

## 2017-01-01 PROCEDURE — 99213 OFFICE O/P EST LOW 20 MIN: CPT | Mod: ZF

## 2017-01-01 PROCEDURE — 99214 OFFICE O/P EST MOD 30 MIN: CPT | Performed by: INTERNAL MEDICINE

## 2017-01-01 PROCEDURE — 85610 PROTHROMBIN TIME: CPT | Performed by: STUDENT IN AN ORGANIZED HEALTH CARE EDUCATION/TRAINING PROGRAM

## 2017-01-01 PROCEDURE — 85018 HEMOGLOBIN: CPT | Performed by: STUDENT IN AN ORGANIZED HEALTH CARE EDUCATION/TRAINING PROGRAM

## 2017-01-01 PROCEDURE — A9270 NON-COVERED ITEM OR SERVICE: HCPCS | Mod: GY | Performed by: EMERGENCY MEDICINE

## 2017-01-01 PROCEDURE — 85520 HEPARIN ASSAY: CPT | Performed by: UROLOGY

## 2017-01-01 PROCEDURE — 80053 COMPREHEN METABOLIC PANEL: CPT | Performed by: FAMILY MEDICINE

## 2017-01-01 PROCEDURE — 82330 ASSAY OF CALCIUM: CPT | Performed by: UROLOGY

## 2017-01-01 PROCEDURE — A9270 NON-COVERED ITEM OR SERVICE: HCPCS | Performed by: STUDENT IN AN ORGANIZED HEALTH CARE EDUCATION/TRAINING PROGRAM

## 2017-01-01 PROCEDURE — 81001 URINALYSIS AUTO W/SCOPE: CPT | Performed by: EMERGENCY MEDICINE

## 2017-01-01 PROCEDURE — 80048 BASIC METABOLIC PNL TOTAL CA: CPT | Performed by: STUDENT IN AN ORGANIZED HEALTH CARE EDUCATION/TRAINING PROGRAM

## 2017-01-01 PROCEDURE — 85520 HEPARIN ASSAY: CPT | Performed by: PHYSICIAN ASSISTANT

## 2017-01-01 PROCEDURE — 25000128 H RX IP 250 OP 636: Performed by: STUDENT IN AN ORGANIZED HEALTH CARE EDUCATION/TRAINING PROGRAM

## 2017-01-01 PROCEDURE — 99205 OFFICE O/P NEW HI 60 MIN: CPT | Mod: ZP | Performed by: INTERNAL MEDICINE

## 2017-01-01 PROCEDURE — 88112 CYTOPATH CELL ENHANCE TECH: CPT | Performed by: UROLOGY

## 2017-01-01 PROCEDURE — 80048 BASIC METABOLIC PNL TOTAL CA: CPT | Performed by: PHYSICIAN ASSISTANT

## 2017-01-01 PROCEDURE — 99214 OFFICE O/P EST MOD 30 MIN: CPT | Performed by: FAMILY MEDICINE

## 2017-01-01 PROCEDURE — 25000132 ZZH RX MED GY IP 250 OP 250 PS 637: Mod: GY | Performed by: STUDENT IN AN ORGANIZED HEALTH CARE EDUCATION/TRAINING PROGRAM

## 2017-01-01 PROCEDURE — 25000125 ZZHC RX 250: Performed by: STUDENT IN AN ORGANIZED HEALTH CARE EDUCATION/TRAINING PROGRAM

## 2017-01-01 PROCEDURE — 51702 INSERT TEMP BLADDER CATH: CPT

## 2017-01-01 PROCEDURE — 99223 1ST HOSP IP/OBS HIGH 75: CPT | Mod: GC | Performed by: INTERNAL MEDICINE

## 2017-01-01 PROCEDURE — 25000125 ZZHC RX 250: Performed by: EMERGENCY MEDICINE

## 2017-01-01 PROCEDURE — 36000051 ZZH SURGERY LEVEL 2 1ST 30 MIN - UMMC: Performed by: UROLOGY

## 2017-01-01 PROCEDURE — 96376 TX/PRO/DX INJ SAME DRUG ADON: CPT | Performed by: EMERGENCY MEDICINE

## 2017-01-01 PROCEDURE — 83605 ASSAY OF LACTIC ACID: CPT

## 2017-01-01 PROCEDURE — 25800025 ZZH RX 258: Performed by: STUDENT IN AN ORGANIZED HEALTH CARE EDUCATION/TRAINING PROGRAM

## 2017-01-01 PROCEDURE — 99211 OFF/OP EST MAY X REQ PHY/QHP: CPT

## 2017-01-01 PROCEDURE — 99214 OFFICE O/P EST MOD 30 MIN: CPT | Performed by: NURSE PRACTITIONER

## 2017-01-01 PROCEDURE — 71000027 ZZH RECOVERY PHASE 2 EACH 15 MINS: Performed by: UROLOGY

## 2017-01-01 PROCEDURE — 87081 CULTURE SCREEN ONLY: CPT | Performed by: NURSE PRACTITIONER

## 2017-01-01 PROCEDURE — 87086 URINE CULTURE/COLONY COUNT: CPT | Performed by: EMERGENCY MEDICINE

## 2017-01-01 PROCEDURE — 25000125 ZZHC RX 250: Performed by: UROLOGY

## 2017-01-01 PROCEDURE — B2111ZZ FLUOROSCOPY OF MULTIPLE CORONARY ARTERIES USING LOW OSMOLAR CONTRAST: ICD-10-PCS | Performed by: INTERNAL MEDICINE

## 2017-01-01 PROCEDURE — 25000128 H RX IP 250 OP 636

## 2017-01-01 PROCEDURE — 27210794 ZZH OR GENERAL SUPPLY STERILE: Performed by: UROLOGY

## 2017-01-01 PROCEDURE — 96374 THER/PROPH/DIAG INJ IV PUSH: CPT | Performed by: EMERGENCY MEDICINE

## 2017-01-01 PROCEDURE — 25000132 ZZH RX MED GY IP 250 OP 250 PS 637: Mod: GY | Performed by: UROLOGY

## 2017-01-01 PROCEDURE — 83605 ASSAY OF LACTIC ACID: CPT | Performed by: UROLOGY

## 2017-01-01 PROCEDURE — 84484 ASSAY OF TROPONIN QUANT: CPT | Performed by: STUDENT IN AN ORGANIZED HEALTH CARE EDUCATION/TRAINING PROGRAM

## 2017-01-01 PROCEDURE — 86923 COMPATIBILITY TEST ELECTRIC: CPT | Performed by: UROLOGY

## 2017-01-01 PROCEDURE — 82947 ASSAY GLUCOSE BLOOD QUANT: CPT | Performed by: UROLOGY

## 2017-01-01 PROCEDURE — 93010 ELECTROCARDIOGRAM REPORT: CPT | Mod: 76 | Performed by: INTERNAL MEDICINE

## 2017-01-01 PROCEDURE — 85027 COMPLETE CBC AUTOMATED: CPT | Performed by: PHYSICIAN ASSISTANT

## 2017-01-01 PROCEDURE — 86850 RBC ANTIBODY SCREEN: CPT | Performed by: UROLOGY

## 2017-01-01 PROCEDURE — 99233 SBSQ HOSP IP/OBS HIGH 50: CPT | Mod: GC | Performed by: INTERNAL MEDICINE

## 2017-01-01 PROCEDURE — 85027 COMPLETE CBC AUTOMATED: CPT | Performed by: STUDENT IN AN ORGANIZED HEALTH CARE EDUCATION/TRAINING PROGRAM

## 2017-01-01 PROCEDURE — 27210946 ZZH KIT HC TOTES DISP CR8

## 2017-01-01 PROCEDURE — 40000561 ZZH STATISTIC CODE BLUE NO ACCESS REQUIRED

## 2017-01-01 PROCEDURE — 99444 ZZC PHYSICIAN ONLINE EVALUATION & MANAGEMENT SERVICE: CPT | Performed by: FAMILY MEDICINE

## 2017-01-01 PROCEDURE — 40000170 ZZH STATISTIC PRE-PROCEDURE ASSESSMENT II: Performed by: UROLOGY

## 2017-01-01 PROCEDURE — 84484 ASSAY OF TROPONIN QUANT: CPT | Performed by: PHYSICIAN ASSISTANT

## 2017-01-01 PROCEDURE — 83036 HEMOGLOBIN GLYCOSYLATED A1C: CPT | Performed by: FAMILY MEDICINE

## 2017-01-01 PROCEDURE — 27210787 ZZH MANIFOLD CR2

## 2017-01-01 PROCEDURE — 25000566 ZZH SEVOFLURANE, EA 15 MIN: Performed by: UROLOGY

## 2017-01-01 PROCEDURE — 25000125 ZZHC RX 250: Performed by: PHYSICIAN ASSISTANT

## 2017-01-01 PROCEDURE — 80053 COMPREHEN METABOLIC PANEL: CPT | Performed by: STUDENT IN AN ORGANIZED HEALTH CARE EDUCATION/TRAINING PROGRAM

## 2017-01-01 PROCEDURE — 86901 BLOOD TYPING SEROLOGIC RH(D): CPT | Performed by: UROLOGY

## 2017-01-01 PROCEDURE — C1894 INTRO/SHEATH, NON-LASER: HCPCS

## 2017-01-01 PROCEDURE — A9270 NON-COVERED ITEM OR SERVICE: HCPCS | Performed by: RESIDENTIAL TREATMENT FACILITY, PHYSICAL DISABILITIES

## 2017-01-01 PROCEDURE — 93455 CORONARY ART/GRFT ANGIO S&I: CPT

## 2017-01-01 PROCEDURE — 99207 C FOOT EXAM  NO CHARGE: CPT | Performed by: FAMILY MEDICINE

## 2017-01-01 PROCEDURE — 27211089 ZZH KIT ACIST INJECTOR CR3

## 2017-01-01 PROCEDURE — 25000125 ZZHC RX 250: Performed by: NURSE ANESTHETIST, CERTIFIED REGISTERED

## 2017-01-01 PROCEDURE — A9270 NON-COVERED ITEM OR SERVICE: HCPCS | Mod: GY | Performed by: UROLOGY

## 2017-01-01 PROCEDURE — 83735 ASSAY OF MAGNESIUM: CPT | Performed by: PHYSICIAN ASSISTANT

## 2017-01-01 PROCEDURE — 80048 BASIC METABOLIC PNL TOTAL CA: CPT | Performed by: FAMILY MEDICINE

## 2017-01-01 PROCEDURE — A9270 NON-COVERED ITEM OR SERVICE: HCPCS | Mod: GY | Performed by: PHYSICIAN ASSISTANT

## 2017-01-01 PROCEDURE — 37000008 ZZH ANESTHESIA TECHNICAL FEE, 1ST 30 MIN: Performed by: UROLOGY

## 2017-01-01 PROCEDURE — 12000006 ZZH R&B IMCU INTERMEDIATE UMMC

## 2017-01-01 PROCEDURE — 36415 COLL VENOUS BLD VENIPUNCTURE: CPT | Performed by: UROLOGY

## 2017-01-01 PROCEDURE — 40000141 ZZH STATISTIC PERIPHERAL IV START W/O US GUIDANCE

## 2017-01-01 PROCEDURE — 85025 COMPLETE CBC W/AUTO DIFF WBC: CPT | Performed by: NURSE PRACTITIONER

## 2017-01-01 PROCEDURE — 25000128 H RX IP 250 OP 636: Performed by: NURSE ANESTHETIST, CERTIFIED REGISTERED

## 2017-01-01 PROCEDURE — 99213 OFFICE O/P EST LOW 20 MIN: CPT | Performed by: INTERNAL MEDICINE

## 2017-01-01 PROCEDURE — 71000015 ZZH RECOVERY PHASE 1 LEVEL 2 EA ADDTL HR: Performed by: UROLOGY

## 2017-01-01 PROCEDURE — 82043 UR ALBUMIN QUANTITATIVE: CPT | Performed by: FAMILY MEDICINE

## 2017-01-01 PROCEDURE — 93455 CORONARY ART/GRFT ANGIO S&I: CPT | Mod: 26 | Performed by: INTERNAL MEDICINE

## 2017-01-01 PROCEDURE — 84443 ASSAY THYROID STIM HORMONE: CPT | Performed by: FAMILY MEDICINE

## 2017-01-01 PROCEDURE — 84460 ALANINE AMINO (ALT) (SGPT): CPT | Performed by: FAMILY MEDICINE

## 2017-01-01 PROCEDURE — C1769 GUIDE WIRE: HCPCS

## 2017-01-01 PROCEDURE — 36000053 ZZH SURGERY LEVEL 2 EA 15 ADDTL MIN - UMMC: Performed by: UROLOGY

## 2017-01-01 PROCEDURE — 85347 COAGULATION TIME ACTIVATED: CPT

## 2017-01-01 PROCEDURE — 87088 URINE BACTERIA CULTURE: CPT | Performed by: EMERGENCY MEDICINE

## 2017-01-01 PROCEDURE — P9016 RBC LEUKOCYTES REDUCED: HCPCS | Performed by: UROLOGY

## 2017-01-01 PROCEDURE — 93880 EXTRACRANIAL BILAT STUDY: CPT

## 2017-01-01 PROCEDURE — 84295 ASSAY OF SERUM SODIUM: CPT | Performed by: UROLOGY

## 2017-01-01 PROCEDURE — C9275 HEXAMINOLEVULINATE HCL: HCPCS | Performed by: UROLOGY

## 2017-01-01 PROCEDURE — 25000132 ZZH RX MED GY IP 250 OP 250 PS 637: Mod: GY | Performed by: EMERGENCY MEDICINE

## 2017-01-01 PROCEDURE — 3C1ZX8Z IRRIGATION OF INDWELLING DEVICE USING IRRIGATING SUBSTANCE, EXTERNAL APPROACH: ICD-10-PCS | Performed by: EMERGENCY MEDICINE

## 2017-01-01 PROCEDURE — 71020 XR CHEST 2 VW: CPT

## 2017-01-01 PROCEDURE — 99152 MOD SED SAME PHYS/QHP 5/>YRS: CPT

## 2017-01-01 PROCEDURE — 36415 COLL VENOUS BLD VENIPUNCTURE: CPT | Performed by: NURSE PRACTITIONER

## 2017-01-01 PROCEDURE — 99285 EMERGENCY DEPT VISIT HI MDM: CPT | Mod: 25 | Performed by: EMERGENCY MEDICINE

## 2017-01-01 PROCEDURE — 93010 ELECTROCARDIOGRAM REPORT: CPT | Performed by: INTERNAL MEDICINE

## 2017-01-01 PROCEDURE — 21400006 ZZH R&B CCU INTERMEDIATE UMMC

## 2017-01-01 PROCEDURE — 87804 INFLUENZA ASSAY W/OPTIC: CPT | Performed by: NURSE PRACTITIONER

## 2017-01-01 PROCEDURE — 85014 HEMATOCRIT: CPT | Performed by: UROLOGY

## 2017-01-01 RX ORDER — METFORMIN HCL 500 MG
1000 TABLET, EXTENDED RELEASE 24 HR ORAL 2 TIMES DAILY WITH MEALS
Status: DISCONTINUED | OUTPATIENT
Start: 2017-01-01 | End: 2017-01-01

## 2017-01-01 RX ORDER — NIFEDIPINE 10 MG/1
10 CAPSULE ORAL
Status: DISCONTINUED | OUTPATIENT
Start: 2017-01-01 | End: 2017-01-01 | Stop reason: HOSPADM

## 2017-01-01 RX ORDER — FENTANYL CITRATE 50 UG/ML
25-50 INJECTION, SOLUTION INTRAMUSCULAR; INTRAVENOUS
Status: DISCONTINUED | OUTPATIENT
Start: 2017-01-01 | End: 2017-01-01 | Stop reason: HOSPADM

## 2017-01-01 RX ORDER — HYDROMORPHONE HYDROCHLORIDE 1 MG/ML
INJECTION, SOLUTION INTRAMUSCULAR; INTRAVENOUS; SUBCUTANEOUS
Status: DISCONTINUED
Start: 2017-01-01 | End: 2017-12-22 | Stop reason: HOSPADM

## 2017-01-01 RX ORDER — MAGNESIUM HYDROXIDE 1200 MG/15ML
1000 LIQUID ORAL CONTINUOUS PRN
Status: DISCONTINUED | OUTPATIENT
Start: 2017-01-01 | End: 2017-01-01 | Stop reason: HOSPADM

## 2017-01-01 RX ORDER — NITROGLYCERIN 5 MG/ML
100-200 VIAL (ML) INTRAVENOUS
Status: DISCONTINUED | OUTPATIENT
Start: 2017-01-01 | End: 2017-01-01 | Stop reason: HOSPADM

## 2017-01-01 RX ORDER — NALOXONE HYDROCHLORIDE 0.4 MG/ML
.1-.4 INJECTION, SOLUTION INTRAMUSCULAR; INTRAVENOUS; SUBCUTANEOUS
Status: DISCONTINUED | OUTPATIENT
Start: 2017-01-01 | End: 2017-01-01 | Stop reason: HOSPADM

## 2017-01-01 RX ORDER — CEFAZOLIN SODIUM 1 G/3ML
1 INJECTION, POWDER, FOR SOLUTION INTRAMUSCULAR; INTRAVENOUS SEE ADMIN INSTRUCTIONS
Status: DISCONTINUED | OUTPATIENT
Start: 2017-01-01 | End: 2017-01-01 | Stop reason: HOSPADM

## 2017-01-01 RX ORDER — AMOXICILLIN 250 MG
1-2 CAPSULE ORAL 2 TIMES DAILY
Qty: 30 TABLET | Refills: 0 | Status: SHIPPED | OUTPATIENT
Start: 2017-01-01

## 2017-01-01 RX ORDER — SULFAMETHOXAZOLE/TRIMETHOPRIM 800-160 MG
1 TABLET ORAL 2 TIMES DAILY
Qty: 6 TABLET | Refills: 0 | Status: SHIPPED | OUTPATIENT
Start: 2017-01-01 | End: 2017-01-01

## 2017-01-01 RX ORDER — PHENYLEPHRINE HCL IN 0.9% NACL 1 MG/10 ML
20-100 SYRINGE (ML) INTRAVENOUS
Status: DISCONTINUED | OUTPATIENT
Start: 2017-01-01 | End: 2017-01-01 | Stop reason: HOSPADM

## 2017-01-01 RX ORDER — FENTANYL CITRATE 50 UG/ML
25-50 INJECTION, SOLUTION INTRAMUSCULAR; INTRAVENOUS EVERY 5 MIN PRN
Status: DISCONTINUED | OUTPATIENT
Start: 2017-01-01 | End: 2017-01-01 | Stop reason: HOSPADM

## 2017-01-01 RX ORDER — POTASSIUM CHLORIDE 7.45 MG/ML
10 INJECTION INTRAVENOUS
Status: DISCONTINUED | OUTPATIENT
Start: 2017-01-01 | End: 2017-01-01 | Stop reason: HOSPADM

## 2017-01-01 RX ORDER — ASPIRIN 325 MG
325 TABLET ORAL
Status: DISCONTINUED | OUTPATIENT
Start: 2017-01-01 | End: 2017-01-01 | Stop reason: HOSPADM

## 2017-01-01 RX ORDER — ACETAMINOPHEN 325 MG/1
650 TABLET ORAL EVERY 4 HOURS PRN
Status: DISCONTINUED | OUTPATIENT
Start: 2017-01-01 | End: 2017-12-22 | Stop reason: HOSPADM

## 2017-01-01 RX ORDER — NICARDIPINE HYDROCHLORIDE 2.5 MG/ML
100 INJECTION INTRAVENOUS
Status: DISCONTINUED | OUTPATIENT
Start: 2017-01-01 | End: 2017-01-01 | Stop reason: HOSPADM

## 2017-01-01 RX ORDER — ISOSORBIDE MONONITRATE 30 MG/1
TABLET, EXTENDED RELEASE ORAL
COMMUNITY

## 2017-01-01 RX ORDER — CEFAZOLIN SODIUM 1 G/3ML
1 INJECTION, POWDER, FOR SOLUTION INTRAMUSCULAR; INTRAVENOUS SEE ADMIN INSTRUCTIONS
Status: CANCELLED | OUTPATIENT
Start: 2017-01-01

## 2017-01-01 RX ORDER — IOPAMIDOL 755 MG/ML
45 INJECTION, SOLUTION INTRAVASCULAR ONCE
Status: COMPLETED | OUTPATIENT
Start: 2017-01-01 | End: 2017-01-01

## 2017-01-01 RX ORDER — CYCLOSPORINE 0.5 MG/ML
1 EMULSION OPHTHALMIC 2 TIMES DAILY
COMMUNITY
Start: 2016-01-01

## 2017-01-01 RX ORDER — CLOPIDOGREL BISULFATE 75 MG/1
75 TABLET ORAL DAILY
Qty: 90 TABLET | Refills: 2 | Status: SHIPPED | OUTPATIENT
Start: 2017-01-01

## 2017-01-01 RX ORDER — ASPIRIN 81 MG/1
81-324 TABLET, CHEWABLE ORAL
Status: DISCONTINUED | OUTPATIENT
Start: 2017-01-01 | End: 2017-01-01 | Stop reason: HOSPADM

## 2017-01-01 RX ORDER — PHENAZOPYRIDINE HYDROCHLORIDE 100 MG/1
100 TABLET, FILM COATED ORAL ONCE
Status: COMPLETED | OUTPATIENT
Start: 2017-01-01 | End: 2017-01-01

## 2017-01-01 RX ORDER — FUROSEMIDE 10 MG/ML
20-100 INJECTION INTRAMUSCULAR; INTRAVENOUS
Status: DISCONTINUED | OUTPATIENT
Start: 2017-01-01 | End: 2017-01-01 | Stop reason: HOSPADM

## 2017-01-01 RX ORDER — LIDOCAINE HYDROCHLORIDE 20 MG/ML
INJECTION, SOLUTION INFILTRATION; PERINEURAL PRN
Status: DISCONTINUED | OUTPATIENT
Start: 2017-01-01 | End: 2017-01-01

## 2017-01-01 RX ORDER — DULOXETIN HYDROCHLORIDE 20 MG/1
CAPSULE, DELAYED RELEASE ORAL
Qty: 180 CAPSULE | Refills: 1 | Status: SHIPPED | OUTPATIENT
Start: 2017-01-01

## 2017-01-01 RX ORDER — ADENOSINE 3 MG/ML
12-12000 INJECTION, SOLUTION INTRAVENOUS
Status: DISCONTINUED | OUTPATIENT
Start: 2017-01-01 | End: 2017-01-01 | Stop reason: HOSPADM

## 2017-01-01 RX ORDER — NITROGLYCERIN 0.4 MG/1
0.4 TABLET SUBLINGUAL EVERY 5 MIN PRN
Status: DISCONTINUED | OUTPATIENT
Start: 2017-01-01 | End: 2017-01-01 | Stop reason: HOSPADM

## 2017-01-01 RX ORDER — MAGNESIUM SULFATE HEPTAHYDRATE 40 MG/ML
4 INJECTION, SOLUTION INTRAVENOUS EVERY 4 HOURS PRN
Status: DISCONTINUED | OUTPATIENT
Start: 2017-01-01 | End: 2017-12-22 | Stop reason: HOSPADM

## 2017-01-01 RX ORDER — ONDANSETRON 2 MG/ML
4 INJECTION INTRAMUSCULAR; INTRAVENOUS EVERY 30 MIN PRN
Status: DISCONTINUED | OUTPATIENT
Start: 2017-01-01 | End: 2017-01-01 | Stop reason: HOSPADM

## 2017-01-01 RX ORDER — DULOXETIN HYDROCHLORIDE 20 MG/1
20 CAPSULE, DELAYED RELEASE ORAL 2 TIMES DAILY
Status: DISCONTINUED | OUTPATIENT
Start: 2017-01-01 | End: 2017-12-22 | Stop reason: HOSPADM

## 2017-01-01 RX ORDER — HYDROCODONE BITARTRATE AND ACETAMINOPHEN 5; 325 MG/1; MG/1
1 TABLET ORAL EVERY 6 HOURS PRN
Qty: 90 TABLET | Refills: 0 | Status: SHIPPED | OUTPATIENT
Start: 2017-01-01 | End: 2017-01-01

## 2017-01-01 RX ORDER — NITROGLYCERIN 5 MG/ML
100-500 VIAL (ML) INTRAVENOUS
Status: DISCONTINUED | OUTPATIENT
Start: 2017-01-01 | End: 2017-01-01 | Stop reason: HOSPADM

## 2017-01-01 RX ORDER — LEVOTHYROXINE SODIUM 25 UG/1
25 TABLET ORAL DAILY
Status: DISCONTINUED | OUTPATIENT
Start: 2017-01-01 | End: 2017-12-22 | Stop reason: HOSPADM

## 2017-01-01 RX ORDER — ONDANSETRON 2 MG/ML
4 INJECTION INTRAMUSCULAR; INTRAVENOUS EVERY 4 HOURS PRN
Status: DISCONTINUED | OUTPATIENT
Start: 2017-01-01 | End: 2017-01-01 | Stop reason: HOSPADM

## 2017-01-01 RX ORDER — MEPERIDINE HYDROCHLORIDE 50 MG/ML
12.5 INJECTION INTRAMUSCULAR; INTRAVENOUS; SUBCUTANEOUS
Status: DISCONTINUED | OUTPATIENT
Start: 2017-01-01 | End: 2017-01-01 | Stop reason: HOSPADM

## 2017-01-01 RX ORDER — ONDANSETRON 2 MG/ML
INJECTION INTRAMUSCULAR; INTRAVENOUS PRN
Status: DISCONTINUED | OUTPATIENT
Start: 2017-01-01 | End: 2017-01-01

## 2017-01-01 RX ORDER — HYDROMORPHONE HCL/0.9% NACL/PF 0.2MG/0.2
0.2 SYRINGE (ML) INTRAVENOUS ONCE
Status: COMPLETED | OUTPATIENT
Start: 2017-01-01 | End: 2017-01-01

## 2017-01-01 RX ORDER — EPINEPHRINE 1 MG/ML
0.3 INJECTION, SOLUTION, CONCENTRATE INTRAVENOUS
Status: DISCONTINUED | OUTPATIENT
Start: 2017-01-01 | End: 2017-01-01 | Stop reason: HOSPADM

## 2017-01-01 RX ORDER — LORAZEPAM 0.5 MG/1
.5-1 TABLET ORAL
Qty: 60 TABLET | Refills: 5 | Status: SHIPPED | OUTPATIENT
Start: 2017-01-01

## 2017-01-01 RX ORDER — DULOXETIN HYDROCHLORIDE 20 MG/1
20 CAPSULE, DELAYED RELEASE ORAL DAILY
Qty: 90 CAPSULE | Refills: 1 | Status: SHIPPED | OUTPATIENT
Start: 2017-01-01 | End: 2017-01-01

## 2017-01-01 RX ORDER — AZITHROMYCIN 250 MG/1
TABLET, FILM COATED ORAL
Qty: 6 TABLET | Refills: 0 | Status: SHIPPED | OUTPATIENT
Start: 2017-01-01 | End: 2017-01-01

## 2017-01-01 RX ORDER — DIPHENHYDRAMINE HCL 50 MG
50 CAPSULE ORAL ONCE
Status: COMPLETED | OUTPATIENT
Start: 2017-01-01 | End: 2017-01-01

## 2017-01-01 RX ORDER — METOPROLOL TARTRATE 50 MG
50 TABLET ORAL 2 TIMES DAILY
Status: DISCONTINUED | OUTPATIENT
Start: 2017-01-01 | End: 2017-12-22 | Stop reason: HOSPADM

## 2017-01-01 RX ORDER — ISOSORBIDE MONONITRATE 30 MG/1
30 TABLET, EXTENDED RELEASE ORAL DAILY
Status: DISCONTINUED | OUTPATIENT
Start: 2017-01-01 | End: 2017-12-22 | Stop reason: HOSPADM

## 2017-01-01 RX ORDER — NALOXONE HYDROCHLORIDE 0.4 MG/ML
0.4 INJECTION, SOLUTION INTRAMUSCULAR; INTRAVENOUS; SUBCUTANEOUS EVERY 5 MIN PRN
Status: DISCONTINUED | OUTPATIENT
Start: 2017-01-01 | End: 2017-01-01 | Stop reason: HOSPADM

## 2017-01-01 RX ORDER — EPTIFIBATIDE 2 MG/ML
1 INJECTION, SOLUTION INTRAVENOUS CONTINUOUS PRN
Status: DISCONTINUED | OUTPATIENT
Start: 2017-01-01 | End: 2017-01-01 | Stop reason: HOSPADM

## 2017-01-01 RX ORDER — PROMETHAZINE HYDROCHLORIDE 25 MG/ML
6.25-25 INJECTION, SOLUTION INTRAMUSCULAR; INTRAVENOUS EVERY 4 HOURS PRN
Status: DISCONTINUED | OUTPATIENT
Start: 2017-01-01 | End: 2017-01-01 | Stop reason: HOSPADM

## 2017-01-01 RX ORDER — DOPAMINE HYDROCHLORIDE 160 MG/100ML
INJECTION, SOLUTION INTRAVENOUS
Status: DISCONTINUED
Start: 2017-01-01 | End: 2017-12-22 | Stop reason: HOSPADM

## 2017-01-01 RX ORDER — EPTIFIBATIDE 2 MG/ML
180 INJECTION, SOLUTION INTRAVENOUS EVERY 10 MIN PRN
Status: DISCONTINUED | OUTPATIENT
Start: 2017-01-01 | End: 2017-01-01 | Stop reason: HOSPADM

## 2017-01-01 RX ORDER — DOPAMINE HYDROCHLORIDE 160 MG/100ML
2.5 INJECTION, SOLUTION INTRAVENOUS CONTINUOUS
Status: DISCONTINUED | OUTPATIENT
Start: 2017-01-01 | End: 2017-12-22 | Stop reason: HOSPADM

## 2017-01-01 RX ORDER — SODIUM CHLORIDE, SODIUM LACTATE, POTASSIUM CHLORIDE, CALCIUM CHLORIDE 600; 310; 30; 20 MG/100ML; MG/100ML; MG/100ML; MG/100ML
INJECTION, SOLUTION INTRAVENOUS CONTINUOUS
Status: DISCONTINUED | OUTPATIENT
Start: 2017-01-01 | End: 2017-01-01 | Stop reason: HOSPADM

## 2017-01-01 RX ORDER — TOLTERODINE 4 MG/1
4 CAPSULE, EXTENDED RELEASE ORAL DAILY PRN
Status: DISCONTINUED | OUTPATIENT
Start: 2017-01-01 | End: 2017-12-22 | Stop reason: HOSPADM

## 2017-01-01 RX ORDER — METHYLPREDNISOLONE SODIUM SUCCINATE 125 MG/2ML
125 INJECTION, POWDER, LYOPHILIZED, FOR SOLUTION INTRAMUSCULAR; INTRAVENOUS
Status: DISCONTINUED | OUTPATIENT
Start: 2017-01-01 | End: 2017-01-01 | Stop reason: HOSPADM

## 2017-01-01 RX ORDER — VERAPAMIL HYDROCHLORIDE 2.5 MG/ML
1-5 INJECTION, SOLUTION INTRAVENOUS
Status: DISCONTINUED | OUTPATIENT
Start: 2017-01-01 | End: 2017-01-01 | Stop reason: HOSPADM

## 2017-01-01 RX ORDER — SODIUM CHLORIDE 9 MG/ML
INJECTION, SOLUTION INTRAVENOUS CONTINUOUS
Status: DISCONTINUED | OUTPATIENT
Start: 2017-01-01 | End: 2017-12-22 | Stop reason: HOSPADM

## 2017-01-01 RX ORDER — HEPARIN SODIUM 1000 [USP'U]/ML
1000-10000 INJECTION, SOLUTION INTRAVENOUS; SUBCUTANEOUS EVERY 5 MIN PRN
Status: DISCONTINUED | OUTPATIENT
Start: 2017-01-01 | End: 2017-01-01 | Stop reason: HOSPADM

## 2017-01-01 RX ORDER — CLOPIDOGREL BISULFATE 75 MG/1
300-600 TABLET ORAL
Status: DISCONTINUED | OUTPATIENT
Start: 2017-01-01 | End: 2017-01-01 | Stop reason: HOSPADM

## 2017-01-01 RX ORDER — DOBUTAMINE HYDROCHLORIDE 200 MG/100ML
2-20 INJECTION INTRAVENOUS CONTINUOUS PRN
Status: DISCONTINUED | OUTPATIENT
Start: 2017-01-01 | End: 2017-01-01 | Stop reason: HOSPADM

## 2017-01-01 RX ORDER — HYDROMORPHONE HYDROCHLORIDE 1 MG/ML
1 INJECTION, SOLUTION INTRAMUSCULAR; INTRAVENOUS; SUBCUTANEOUS ONCE
Status: COMPLETED | OUTPATIENT
Start: 2017-01-01 | End: 2017-01-01

## 2017-01-01 RX ORDER — DIPHENHYDRAMINE HYDROCHLORIDE 50 MG/ML
25-50 INJECTION INTRAMUSCULAR; INTRAVENOUS
Status: DISCONTINUED | OUTPATIENT
Start: 2017-01-01 | End: 2017-01-01 | Stop reason: HOSPADM

## 2017-01-01 RX ORDER — FENTANYL CITRATE 50 UG/ML
INJECTION, SOLUTION INTRAMUSCULAR; INTRAVENOUS PRN
Status: DISCONTINUED | OUTPATIENT
Start: 2017-01-01 | End: 2017-01-01

## 2017-01-01 RX ORDER — METOPROLOL TARTRATE 25 MG/1
25 TABLET, FILM COATED ORAL
COMMUNITY
End: 2017-01-01

## 2017-01-01 RX ORDER — HYDROCODONE BITARTRATE AND ACETAMINOPHEN 5; 325 MG/1; MG/1
1-2 TABLET ORAL EVERY 6 HOURS PRN
Status: DISCONTINUED | OUTPATIENT
Start: 2017-01-01 | End: 2017-12-22 | Stop reason: HOSPADM

## 2017-01-01 RX ORDER — LIDOCAINE HYDROCHLORIDE 10 MG/ML
30 INJECTION, SOLUTION EPIDURAL; INFILTRATION; INTRACAUDAL; PERINEURAL
Status: DISCONTINUED | OUTPATIENT
Start: 2017-01-01 | End: 2017-01-01 | Stop reason: HOSPADM

## 2017-01-01 RX ORDER — METFORMIN HCL 500 MG
1000 TABLET, EXTENDED RELEASE 24 HR ORAL 2 TIMES DAILY WITH MEALS
Qty: 360 TABLET | Refills: 1 | Status: SHIPPED | OUTPATIENT
Start: 2017-01-01

## 2017-01-01 RX ORDER — ATROPINE SULFATE 0.1 MG/ML
.5-1 INJECTION INTRAVENOUS
Status: DISCONTINUED | OUTPATIENT
Start: 2017-01-01 | End: 2017-01-01 | Stop reason: HOSPADM

## 2017-01-01 RX ORDER — PROPOFOL 10 MG/ML
INJECTION, EMULSION INTRAVENOUS PRN
Status: DISCONTINUED | OUTPATIENT
Start: 2017-01-01 | End: 2017-01-01

## 2017-01-01 RX ORDER — LEVOTHYROXINE SODIUM 75 UG/1
75 TABLET ORAL DAILY
Qty: 90 TABLET | Refills: 1 | OUTPATIENT
Start: 2017-01-01

## 2017-01-01 RX ORDER — DOXYCYCLINE 100 MG/1
CAPSULE ORAL
COMMUNITY
End: 2017-01-01 | Stop reason: ALTCHOICE

## 2017-01-01 RX ORDER — LEVOTHYROXINE SODIUM 25 UG/1
25 TABLET ORAL DAILY
COMMUNITY
End: 2017-01-01

## 2017-01-01 RX ORDER — TOLTERODINE 4 MG/1
4 CAPSULE, EXTENDED RELEASE ORAL DAILY PRN
Qty: 2 CAPSULE | Refills: 0 | Status: SHIPPED | OUTPATIENT
Start: 2017-01-01

## 2017-01-01 RX ORDER — CEFAZOLIN SODIUM 2 G/100ML
2 INJECTION, SOLUTION INTRAVENOUS
Status: COMPLETED | OUTPATIENT
Start: 2017-01-01 | End: 2017-01-01

## 2017-01-01 RX ORDER — SODIUM NITROPRUSSIDE 25 MG/ML
100-200 INJECTION INTRAVENOUS
Status: DISCONTINUED | OUTPATIENT
Start: 2017-01-01 | End: 2017-01-01 | Stop reason: HOSPADM

## 2017-01-01 RX ORDER — NITROGLYCERIN 20 MG/100ML
0.07-2 INJECTION INTRAVENOUS CONTINUOUS
Status: DISCONTINUED | OUTPATIENT
Start: 2017-01-01 | End: 2017-12-22 | Stop reason: HOSPADM

## 2017-01-01 RX ORDER — TIZANIDINE 2 MG/1
1-2 TABLET ORAL 2 TIMES DAILY PRN
Qty: 30 TABLET | Refills: 1 | Status: SHIPPED | OUTPATIENT
Start: 2017-01-01

## 2017-01-01 RX ORDER — METFORMIN HCL 500 MG
TABLET, EXTENDED RELEASE 24 HR ORAL
Qty: 270 TABLET | Refills: 1 | Status: SHIPPED | OUTPATIENT
Start: 2017-01-01 | End: 2017-01-01

## 2017-01-01 RX ORDER — LIDOCAINE 4 G/G
1 PATCH TOPICAL
Status: DISCONTINUED | OUTPATIENT
Start: 2017-01-01 | End: 2017-12-22 | Stop reason: HOSPADM

## 2017-01-01 RX ORDER — CIPROFLOXACIN 500 MG/1
500 TABLET, FILM COATED ORAL ONCE
Status: COMPLETED | OUTPATIENT
Start: 2017-01-01 | End: 2017-01-01

## 2017-01-01 RX ORDER — HYDROMORPHONE HYDROCHLORIDE 1 MG/ML
.3-.5 INJECTION, SOLUTION INTRAMUSCULAR; INTRAVENOUS; SUBCUTANEOUS EVERY 10 MIN PRN
Status: DISCONTINUED | OUTPATIENT
Start: 2017-01-01 | End: 2017-01-01 | Stop reason: HOSPADM

## 2017-01-01 RX ORDER — HEPARIN SODIUM 10000 [USP'U]/100ML
0-3500 INJECTION, SOLUTION INTRAVENOUS CONTINUOUS
Status: DISCONTINUED | OUTPATIENT
Start: 2017-01-01 | End: 2017-01-01

## 2017-01-01 RX ORDER — HYDRALAZINE HYDROCHLORIDE 20 MG/ML
10-20 INJECTION INTRAMUSCULAR; INTRAVENOUS
Status: DISCONTINUED | OUTPATIENT
Start: 2017-01-01 | End: 2017-01-01 | Stop reason: HOSPADM

## 2017-01-01 RX ORDER — ARGATROBAN 1 MG/ML
350 INJECTION, SOLUTION INTRAVENOUS
Status: DISCONTINUED | OUTPATIENT
Start: 2017-01-01 | End: 2017-01-01 | Stop reason: HOSPADM

## 2017-01-01 RX ORDER — ONDANSETRON 4 MG/1
4 TABLET, ORALLY DISINTEGRATING ORAL EVERY 30 MIN PRN
Status: DISCONTINUED | OUTPATIENT
Start: 2017-01-01 | End: 2017-01-01 | Stop reason: HOSPADM

## 2017-01-01 RX ORDER — PROTAMINE SULFATE 10 MG/ML
25-100 INJECTION, SOLUTION INTRAVENOUS EVERY 5 MIN PRN
Status: DISCONTINUED | OUTPATIENT
Start: 2017-01-01 | End: 2017-01-01 | Stop reason: HOSPADM

## 2017-01-01 RX ORDER — HYDROCODONE BITARTRATE AND ACETAMINOPHEN 5; 325 MG/1; MG/1
1 TABLET ORAL EVERY 6 HOURS PRN
Qty: 90 TABLET | Refills: 0 | Status: SHIPPED | OUTPATIENT
Start: 2017-01-01

## 2017-01-01 RX ORDER — FENTANYL CITRATE 50 UG/ML
25-50 INJECTION, SOLUTION INTRAMUSCULAR; INTRAVENOUS
Status: CANCELLED | OUTPATIENT
Start: 2017-01-01

## 2017-01-01 RX ORDER — NITROGLYCERIN 0.4 MG/1
0.4 TABLET SUBLINGUAL EVERY 5 MIN PRN
Status: DISCONTINUED | OUTPATIENT
Start: 2017-01-01 | End: 2017-12-22 | Stop reason: HOSPADM

## 2017-01-01 RX ORDER — METOPROLOL TARTRATE 1 MG/ML
5 INJECTION, SOLUTION INTRAVENOUS EVERY 5 MIN PRN
Status: DISCONTINUED | OUTPATIENT
Start: 2017-01-01 | End: 2017-01-01 | Stop reason: HOSPADM

## 2017-01-01 RX ORDER — PROTAMINE SULFATE 10 MG/ML
1-5 INJECTION, SOLUTION INTRAVENOUS
Status: DISCONTINUED | OUTPATIENT
Start: 2017-01-01 | End: 2017-01-01 | Stop reason: HOSPADM

## 2017-01-01 RX ORDER — HYDROMORPHONE HYDROCHLORIDE 1 MG/ML
1 INJECTION, SOLUTION INTRAMUSCULAR; INTRAVENOUS; SUBCUTANEOUS ONCE
Status: DISCONTINUED | OUTPATIENT
Start: 2017-01-01 | End: 2017-01-01

## 2017-01-01 RX ORDER — METFORMIN HCL 500 MG
1000 TABLET, EXTENDED RELEASE 24 HR ORAL 2 TIMES DAILY WITH MEALS
Qty: 360 TABLET | Refills: 1 | Status: SHIPPED | OUTPATIENT
Start: 2017-01-01 | End: 2017-01-01

## 2017-01-01 RX ORDER — PRASUGREL 10 MG/1
10-60 TABLET, FILM COATED ORAL
Status: DISCONTINUED | OUTPATIENT
Start: 2017-01-01 | End: 2017-01-01 | Stop reason: HOSPADM

## 2017-01-01 RX ORDER — LIDOCAINE 40 MG/G
CREAM TOPICAL
Status: DISCONTINUED | OUTPATIENT
Start: 2017-01-01 | End: 2017-01-01 | Stop reason: HOSPADM

## 2017-01-01 RX ORDER — HYDROMORPHONE HYDROCHLORIDE 1 MG/ML
0.3 INJECTION, SOLUTION INTRAMUSCULAR; INTRAVENOUS; SUBCUTANEOUS ONCE
Status: COMPLETED | OUTPATIENT
Start: 2017-01-01 | End: 2017-01-01

## 2017-01-01 RX ORDER — EPTIFIBATIDE 2 MG/ML
2 INJECTION, SOLUTION INTRAVENOUS CONTINUOUS PRN
Status: DISCONTINUED | OUTPATIENT
Start: 2017-01-01 | End: 2017-01-01 | Stop reason: HOSPADM

## 2017-01-01 RX ORDER — EPHEDRINE SULFATE 50 MG/ML
INJECTION, SOLUTION INTRAMUSCULAR; INTRAVENOUS; SUBCUTANEOUS PRN
Status: DISCONTINUED | OUTPATIENT
Start: 2017-01-01 | End: 2017-01-01

## 2017-01-01 RX ORDER — NALOXONE HYDROCHLORIDE 0.4 MG/ML
.1-.4 INJECTION, SOLUTION INTRAMUSCULAR; INTRAVENOUS; SUBCUTANEOUS
Status: DISCONTINUED | OUTPATIENT
Start: 2017-01-01 | End: 2017-12-22 | Stop reason: HOSPADM

## 2017-01-01 RX ORDER — METOPROLOL TARTRATE 1 MG/ML
5 INJECTION, SOLUTION INTRAVENOUS ONCE
Status: COMPLETED | OUTPATIENT
Start: 2017-01-01 | End: 2017-01-01

## 2017-01-01 RX ORDER — FLUMAZENIL 0.1 MG/ML
0.2 INJECTION, SOLUTION INTRAVENOUS
Status: DISCONTINUED | OUTPATIENT
Start: 2017-01-01 | End: 2017-01-01 | Stop reason: HOSPADM

## 2017-01-01 RX ORDER — NITROGLYCERIN 20 MG/100ML
.07-2 INJECTION INTRAVENOUS CONTINUOUS PRN
Status: DISCONTINUED | OUTPATIENT
Start: 2017-01-01 | End: 2017-01-01 | Stop reason: HOSPADM

## 2017-01-01 RX ORDER — ACETAMINOPHEN 325 MG/1
325-650 TABLET ORAL EVERY 6 HOURS PRN
Qty: 30 TABLET | Refills: 0 | Status: SHIPPED | OUTPATIENT
Start: 2017-01-01

## 2017-01-01 RX ORDER — HYDROCODONE BITARTRATE AND ACETAMINOPHEN 5; 325 MG/1; MG/1
1-2 TABLET ORAL EVERY 6 HOURS PRN
Qty: 21 TABLET | Refills: 0 | Status: SHIPPED | OUTPATIENT
Start: 2017-01-01

## 2017-01-01 RX ORDER — DULOXETIN HYDROCHLORIDE 20 MG/1
20 CAPSULE, DELAYED RELEASE ORAL 2 TIMES DAILY
Qty: 180 CAPSULE | Refills: 1 | Status: SHIPPED | OUTPATIENT
Start: 2017-01-01 | End: 2017-01-01

## 2017-01-01 RX ORDER — ASPIRIN 325 MG
325 TABLET ORAL DAILY
Status: DISCONTINUED | OUTPATIENT
Start: 2017-01-01 | End: 2017-01-01

## 2017-01-01 RX ORDER — CLOPIDOGREL BISULFATE 75 MG/1
75 TABLET ORAL
Status: DISCONTINUED | OUTPATIENT
Start: 2017-01-01 | End: 2017-01-01 | Stop reason: HOSPADM

## 2017-01-01 RX ORDER — ENALAPRILAT 1.25 MG/ML
1.25-2.5 INJECTION INTRAVENOUS
Status: DISCONTINUED | OUTPATIENT
Start: 2017-01-01 | End: 2017-01-01 | Stop reason: HOSPADM

## 2017-01-01 RX ORDER — LORAZEPAM 2 MG/ML
.5-2 INJECTION INTRAMUSCULAR EVERY 4 HOURS PRN
Status: DISCONTINUED | OUTPATIENT
Start: 2017-01-01 | End: 2017-01-01 | Stop reason: HOSPADM

## 2017-01-01 RX ORDER — METOPROLOL TARTRATE 50 MG
50 TABLET ORAL 2 TIMES DAILY
COMMUNITY
End: 2017-01-01

## 2017-01-01 RX ORDER — DOPAMINE HYDROCHLORIDE 160 MG/100ML
2-20 INJECTION, SOLUTION INTRAVENOUS CONTINUOUS PRN
Status: DISCONTINUED | OUTPATIENT
Start: 2017-01-01 | End: 2017-01-01 | Stop reason: HOSPADM

## 2017-01-01 RX ORDER — DEXTROSE MONOHYDRATE 25 G/50ML
12.5-5 INJECTION, SOLUTION INTRAVENOUS EVERY 30 MIN PRN
Status: DISCONTINUED | OUTPATIENT
Start: 2017-01-01 | End: 2017-01-01 | Stop reason: HOSPADM

## 2017-01-01 RX ORDER — POTASSIUM CHLORIDE 29.8 MG/ML
20 INJECTION INTRAVENOUS
Status: DISCONTINUED | OUTPATIENT
Start: 2017-01-01 | End: 2017-01-01 | Stop reason: HOSPADM

## 2017-01-01 RX ORDER — ASPIRIN 81 MG/1
81 TABLET ORAL DAILY
Status: DISCONTINUED | OUTPATIENT
Start: 2017-12-22 | End: 2017-12-22 | Stop reason: HOSPADM

## 2017-01-01 RX ORDER — ARGATROBAN 1 MG/ML
150 INJECTION, SOLUTION INTRAVENOUS
Status: DISCONTINUED | OUTPATIENT
Start: 2017-01-01 | End: 2017-01-01 | Stop reason: HOSPADM

## 2017-01-01 RX ADMIN — SODIUM CHLORIDE 3000 ML: 900 IRRIGANT IRRIGATION at 19:41

## 2017-01-01 RX ADMIN — PROPOFOL 50 MG: 10 INJECTION, EMULSION INTRAVENOUS at 08:26

## 2017-01-01 RX ADMIN — NITROGLYCERIN 0.4 MG: 0.4 TABLET SUBLINGUAL at 21:13

## 2017-01-01 RX ADMIN — ASPIRIN 325 MG ORAL TABLET 325 MG: 325 PILL ORAL at 12:08

## 2017-01-01 RX ADMIN — SODIUM CHLORIDE 3000 ML: 900 IRRIGANT IRRIGATION at 13:00

## 2017-01-01 RX ADMIN — ATROPA BELLADONNA AND OPIUM 1 SUPPOSITORY: 16.2; 3 SUPPOSITORY RECTAL at 06:19

## 2017-01-01 RX ADMIN — DOPAMINE HYDROCHLORIDE 2.5 MCG/KG/MIN: 160 INJECTION, SOLUTION INTRAVENOUS at 20:49

## 2017-01-01 RX ADMIN — ATROPA BELLADONNA AND OPIUM 1 SUPPOSITORY: 16.2; 3 SUPPOSITORY RECTAL at 12:09

## 2017-01-01 RX ADMIN — FENTANYL CITRATE 50 MCG: 50 INJECTION, SOLUTION INTRAMUSCULAR; INTRAVENOUS at 09:01

## 2017-01-01 RX ADMIN — PHENYLEPHRINE HYDROCHLORIDE 100 MCG: 10 INJECTION, SOLUTION INTRAMUSCULAR; INTRAVENOUS; SUBCUTANEOUS at 08:57

## 2017-01-01 RX ADMIN — Medication 0.2 MG: at 02:44

## 2017-01-01 RX ADMIN — METOPROLOL TARTRATE 5 MG: 5 INJECTION, SOLUTION INTRAVENOUS at 21:19

## 2017-01-01 RX ADMIN — METFORMIN HYDROCHLORIDE 1000 MG: 500 TABLET, EXTENDED RELEASE ORAL at 19:54

## 2017-01-01 RX ADMIN — FENTANYL CITRATE 25 MCG: 50 INJECTION, SOLUTION INTRAMUSCULAR; INTRAVENOUS at 18:01

## 2017-01-01 RX ADMIN — HEPARIN SODIUM 850 UNITS/HR: 10000 INJECTION, SOLUTION INTRAVENOUS at 15:52

## 2017-01-01 RX ADMIN — PHENYLEPHRINE HYDROCHLORIDE 100 MCG: 10 INJECTION, SOLUTION INTRAMUSCULAR; INTRAVENOUS; SUBCUTANEOUS at 09:00

## 2017-01-01 RX ADMIN — SODIUM CHLORIDE 500 ML: 9 INJECTION, SOLUTION INTRAVENOUS at 14:26

## 2017-01-01 RX ADMIN — SODIUM CHLORIDE 3000 ML: 900 IRRIGANT IRRIGATION at 05:40

## 2017-01-01 RX ADMIN — Medication 4 G: at 15:23

## 2017-01-01 RX ADMIN — SODIUM CHLORIDE: 9 INJECTION, SOLUTION INTRAVENOUS at 05:34

## 2017-01-01 RX ADMIN — Medication 5 MG: at 08:44

## 2017-01-01 RX ADMIN — TOLTERODINE TARTRATE 4 MG: 4 CAPSULE, EXTENDED RELEASE ORAL at 05:35

## 2017-01-01 RX ADMIN — Medication 5 MG: at 08:41

## 2017-01-01 RX ADMIN — SODIUM CHLORIDE 3000 ML: 900 IRRIGANT IRRIGATION at 07:32

## 2017-01-01 RX ADMIN — Medication 1 TABLET: at 11:44

## 2017-01-01 RX ADMIN — DIPHENHYDRAMINE HYDROCHLORIDE 50 MG: 50 CAPSULE ORAL at 17:55

## 2017-01-01 RX ADMIN — SODIUM CHLORIDE: 9 INJECTION, SOLUTION INTRAVENOUS at 13:56

## 2017-01-01 RX ADMIN — SODIUM CHLORIDE 3000 ML: 900 IRRIGANT IRRIGATION at 04:59

## 2017-01-01 RX ADMIN — TRAMADOL HYDROCHLORIDE AND ACETAMINOPHEN 2 TABLET: 37.5; 325 TABLET ORAL at 21:02

## 2017-01-01 RX ADMIN — LIDOCAINE HYDROCHLORIDE 80 MG: 20 INJECTION, SOLUTION INFILTRATION; PERINEURAL at 08:26

## 2017-01-01 RX ADMIN — SODIUM CHLORIDE 3000 ML: 900 IRRIGANT IRRIGATION at 14:55

## 2017-01-01 RX ADMIN — SODIUM CHLORIDE 3000 ML: 900 IRRIGANT IRRIGATION at 20:21

## 2017-01-01 RX ADMIN — CIPROFLOXACIN HYDROCHLORIDE 500 MG: 500 TABLET, FILM COATED ORAL at 05:12

## 2017-01-01 RX ADMIN — PROPOFOL 20 MG: 10 INJECTION, EMULSION INTRAVENOUS at 08:28

## 2017-01-01 RX ADMIN — PROPOFOL 50 MG: 10 INJECTION, EMULSION INTRAVENOUS at 08:57

## 2017-01-01 RX ADMIN — Medication 0.3 MG: at 10:39

## 2017-01-01 RX ADMIN — ONDANSETRON 4 MG: 2 INJECTION INTRAMUSCULAR; INTRAVENOUS at 09:44

## 2017-01-01 RX ADMIN — PHENYLEPHRINE HYDROCHLORIDE 50 MCG: 10 INJECTION, SOLUTION INTRAMUSCULAR; INTRAVENOUS; SUBCUTANEOUS at 09:23

## 2017-01-01 RX ADMIN — Medication: at 14:16

## 2017-01-01 RX ADMIN — SODIUM CHLORIDE: 9 INJECTION, SOLUTION INTRAVENOUS at 22:56

## 2017-01-01 RX ADMIN — METOPROLOL TARTRATE 50 MG: 50 TABLET, FILM COATED ORAL at 09:07

## 2017-01-01 RX ADMIN — SODIUM CHLORIDE 3000 ML: 900 IRRIGANT IRRIGATION at 12:00

## 2017-01-01 RX ADMIN — Medication 1 MG: at 00:49

## 2017-01-01 RX ADMIN — LIDOCAINE HYDROCHLORIDE 10 ML: 20 JELLY TOPICAL at 21:49

## 2017-01-01 RX ADMIN — PHENYLEPHRINE HYDROCHLORIDE 50 MCG: 10 INJECTION, SOLUTION INTRAMUSCULAR; INTRAVENOUS; SUBCUTANEOUS at 08:53

## 2017-01-01 RX ADMIN — IOPAMIDOL 45 ML: 755 INJECTION, SOLUTION INTRAVASCULAR at 18:15

## 2017-01-01 RX ADMIN — DULOXETINE 20 MG: 20 CAPSULE, DELAYED RELEASE ORAL at 09:08

## 2017-01-01 RX ADMIN — LIDOCAINE HYDROCHLORIDE 10 ML: 20 JELLY TOPICAL at 20:16

## 2017-01-01 RX ADMIN — SODIUM CHLORIDE: 9 INJECTION, SOLUTION INTRAVENOUS at 09:18

## 2017-01-01 RX ADMIN — SODIUM CHLORIDE 3000 ML: 900 IRRIGANT IRRIGATION at 14:00

## 2017-01-01 RX ADMIN — PHENYLEPHRINE HYDROCHLORIDE 100 MCG: 10 INJECTION, SOLUTION INTRAMUSCULAR; INTRAVENOUS; SUBCUTANEOUS at 08:45

## 2017-01-01 RX ADMIN — HYDROCODONE BITARTRATE AND ACETAMINOPHEN 2 TABLET: 5; 325 TABLET ORAL at 03:01

## 2017-01-01 RX ADMIN — Medication 0.2 MG: at 10:46

## 2017-01-01 RX ADMIN — NITROGLYCERIN 0.4 MG: 0.4 TABLET SUBLINGUAL at 10:12

## 2017-01-01 RX ADMIN — METOPROLOL TARTRATE 50 MG: 50 TABLET, FILM COATED ORAL at 19:54

## 2017-01-01 RX ADMIN — PROPOFOL 50 MG: 10 INJECTION, EMULSION INTRAVENOUS at 09:03

## 2017-01-01 RX ADMIN — PROPOFOL 30 MG: 10 INJECTION, EMULSION INTRAVENOUS at 09:10

## 2017-01-01 RX ADMIN — HEPARIN SODIUM 4200 UNITS: 1000 INJECTION, SOLUTION INTRAVENOUS; SUBCUTANEOUS at 15:52

## 2017-01-01 RX ADMIN — Medication 0.2 MG: at 19:21

## 2017-01-01 RX ADMIN — NITROGLYCERIN 0.07 MCG/KG/MIN: 20 INJECTION INTRAVENOUS at 21:53

## 2017-01-01 RX ADMIN — PHENYLEPHRINE HYDROCHLORIDE 100 MCG: 10 INJECTION, SOLUTION INTRAMUSCULAR; INTRAVENOUS; SUBCUTANEOUS at 09:02

## 2017-01-01 RX ADMIN — PROPOFOL 50 MG: 10 INJECTION, EMULSION INTRAVENOUS at 09:00

## 2017-01-01 RX ADMIN — ACETAMINOPHEN 650 MG: 325 TABLET, FILM COATED ORAL at 04:08

## 2017-01-01 RX ADMIN — ISOSORBIDE MONONITRATE 30 MG: 30 TABLET, EXTENDED RELEASE ORAL at 09:07

## 2017-01-01 RX ADMIN — HYDROCODONE BITARTRATE AND ACETAMINOPHEN 2 TABLET: 5; 325 TABLET ORAL at 10:09

## 2017-01-01 RX ADMIN — PROPOFOL 50 MG: 10 INJECTION, EMULSION INTRAVENOUS at 09:15

## 2017-01-01 RX ADMIN — CEFAZOLIN SODIUM 2 G: 2 INJECTION, SOLUTION INTRAVENOUS at 08:46

## 2017-01-01 RX ADMIN — LIDOCAINE HYDROCHLORIDE 10 ML: 20 JELLY TOPICAL at 00:49

## 2017-01-01 RX ADMIN — HEXAMINOLEVULINATE HYDROCHLORIDE 100 MG: KIT at 07:30

## 2017-01-01 RX ADMIN — SODIUM CHLORIDE 3000 ML: 900 IRRIGANT IRRIGATION at 19:40

## 2017-01-01 RX ADMIN — PROPOFOL 30 MG: 10 INJECTION, EMULSION INTRAVENOUS at 08:29

## 2017-01-01 RX ADMIN — LEVOTHYROXINE SODIUM 25 MCG: 25 TABLET ORAL at 09:13

## 2017-01-01 RX ADMIN — METFORMIN HYDROCHLORIDE 1000 MG: 500 TABLET, EXTENDED RELEASE ORAL at 09:07

## 2017-01-01 RX ADMIN — ISOSORBIDE MONONITRATE 30 MG: 30 TABLET, EXTENDED RELEASE ORAL at 09:13

## 2017-01-01 RX ADMIN — Medication 0.2 MG: at 11:16

## 2017-01-01 RX ADMIN — SODIUM CHLORIDE 250 ML: 9 INJECTION, SOLUTION INTRAVENOUS at 20:01

## 2017-01-01 RX ADMIN — SODIUM CHLORIDE, POTASSIUM CHLORIDE, SODIUM LACTATE AND CALCIUM CHLORIDE: 600; 310; 30; 20 INJECTION, SOLUTION INTRAVENOUS at 08:19

## 2017-01-01 RX ADMIN — FENTANYL CITRATE 50 MCG: 50 INJECTION, SOLUTION INTRAMUSCULAR; INTRAVENOUS at 08:26

## 2017-01-01 RX ADMIN — SODIUM CHLORIDE: 900 IRRIGANT IRRIGATION at 12:30

## 2017-01-01 RX ADMIN — ATROPA BELLADONNA AND OPIUM 1 SUPPOSITORY: 16.2; 3 SUPPOSITORY RECTAL at 21:03

## 2017-01-01 RX ADMIN — SODIUM CHLORIDE 3000 ML: 900 IRRIGANT IRRIGATION at 22:53

## 2017-01-01 RX ADMIN — HYDROCODONE BITARTRATE AND ACETAMINOPHEN 2 TABLET: 5; 325 TABLET ORAL at 15:04

## 2017-01-01 RX ADMIN — NITROGLYCERIN 0.4 MG: 0.4 TABLET SUBLINGUAL at 20:05

## 2017-01-01 RX ADMIN — HYDROCODONE BITARTRATE AND ACETAMINOPHEN 2 TABLET: 5; 325 TABLET ORAL at 19:54

## 2017-01-01 RX ADMIN — SODIUM CHLORIDE 3000 ML: 900 IRRIGANT IRRIGATION at 14:30

## 2017-01-01 RX ADMIN — PHENYLEPHRINE HYDROCHLORIDE 50 MCG: 10 INJECTION, SOLUTION INTRAMUSCULAR; INTRAVENOUS; SUBCUTANEOUS at 09:18

## 2017-01-01 RX ADMIN — HEPARIN SODIUM 850 UNITS/HR: 10000 INJECTION, SOLUTION INTRAVENOUS at 19:29

## 2017-01-01 RX ADMIN — METOPROLOL TARTRATE 50 MG: 50 TABLET, FILM COATED ORAL at 09:13

## 2017-01-01 RX ADMIN — DULOXETINE 20 MG: 20 CAPSULE, DELAYED RELEASE ORAL at 19:54

## 2017-01-01 RX ADMIN — SODIUM CHLORIDE 3000 ML: 900 IRRIGANT IRRIGATION at 05:17

## 2017-01-01 RX ADMIN — SODIUM CHLORIDE 3000 ML: 900 IRRIGANT IRRIGATION at 06:39

## 2017-01-01 RX ADMIN — PHENAZOPYRIDINE HYDROCHLORIDE 100 MG: 100 TABLET, COATED ORAL at 11:11

## 2017-01-01 RX ADMIN — NITROGLYCERIN 0.27 MCG/KG/MIN: 20 INJECTION INTRAVENOUS at 14:49

## 2017-01-01 RX ADMIN — ATROPA BELLADONNA AND OPIUM 1 SUPPOSITORY: 16.2; 3 SUPPOSITORY RECTAL at 10:39

## 2017-01-01 RX ADMIN — NITROGLYCERIN 0.4 MG: 0.4 TABLET SUBLINGUAL at 20:00

## 2017-01-01 RX ADMIN — ASPIRIN 325 MG ORAL TABLET 325 MG: 325 PILL ORAL at 09:13

## 2017-01-01 RX ADMIN — SODIUM CHLORIDE: 900 IRRIGANT IRRIGATION at 16:55

## 2017-01-01 RX ADMIN — SODIUM CHLORIDE: 9 INJECTION, SOLUTION INTRAVENOUS at 19:01

## 2017-01-01 RX ADMIN — DULOXETINE 20 MG: 20 CAPSULE, DELAYED RELEASE ORAL at 09:13

## 2017-01-01 RX ADMIN — LEVOTHYROXINE SODIUM 25 MCG: 25 TABLET ORAL at 09:07

## 2017-01-01 RX ADMIN — SODIUM CHLORIDE 3000 ML: 900 IRRIGANT IRRIGATION at 09:42

## 2017-01-01 RX ADMIN — SODIUM CHLORIDE 3000 ML: 900 IRRIGANT IRRIGATION at 03:49

## 2017-01-01 RX ADMIN — Medication 0.3 MG: at 10:17

## 2017-01-01 ASSESSMENT — ENCOUNTER SYMPTOMS
HEMATURIA: 0
ARTHRALGIAS: 1
FLANK PAIN: 1
PALPITATIONS: 0
PANIC: 0
ORTHOPNEA: 0
NECK MASS: 1
WEIGHT LOSS: 0
INCREASED ENERGY: 0
STIFFNESS: 1
HOARSE VOICE: 0
NECK PAIN: 1
JAUNDICE: 0
JOINT SWELLING: 1
DIARRHEA: 0
RECTAL PAIN: 1
ABDOMINAL PAIN: 1
LEG PAIN: 1
MYALGIAS: 1
ALTERED TEMPERATURE REGULATION: 0
BACK PAIN: 1
ARTHRALGIAS: 1
NECK MASS: 1
DECREASED APPETITE: 0
SORE THROAT: 0
FEVER: 0
NERVOUS/ANXIOUS: 0
SINUS CONGESTION: 0
CONSTIPATION: 1
DIFFICULTY URINATING: 1
DIFFICULTY URINATING: 1
HALLUCINATIONS: 0
MUSCLE WEAKNESS: 1
POLYPHAGIA: 0
DECREASED CONCENTRATION: 0
DEPRESSION: 1
STIFFNESS: 1
HEMATURIA: 0
DYSURIA: 1
DIFFICULTY URINATING: 1
BLOOD IN STOOL: 0
HEARTBURN: 1
MYALGIAS: 1
TROUBLE SWALLOWING: 0
ABDOMINAL PAIN: 1
SLEEP DISTURBANCES DUE TO BREATHING: 0
TROUBLE SWALLOWING: 0
FEVER: 0
CHILLS: 0
INSOMNIA: 1
HYPERTENSION: 1
MUSCLE WEAKNESS: 1
SORE THROAT: 0
NAUSEA: 1
JOINT SWELLING: 0
SINUS PAIN: 1
FATIGUE: 0
SYNCOPE: 0
HOARSE VOICE: 0
FLANK PAIN: 0
TASTE DISTURBANCE: 0
POLYDIPSIA: 0
HEMATURIA: 1
SINUS CONGESTION: 0
MUSCLE CRAMPS: 1
SINUS PAIN: 0
MUSCLE CRAMPS: 1
NIGHT SWEATS: 0
SMELL DISTURBANCE: 0
EXERCISE INTOLERANCE: 1
SMELL DISTURBANCE: 0
BACK PAIN: 1
TASTE DISTURBANCE: 0
LIGHT-HEADEDNESS: 1
VOMITING: 0
WEIGHT GAIN: 0
DYSURIA: 0
BLOATING: 0
BOWEL INCONTINENCE: 0
NECK PAIN: 1
HYPOTENSION: 0

## 2017-01-01 ASSESSMENT — PAIN DESCRIPTION - DESCRIPTORS
DESCRIPTORS: ACHING
DESCRIPTORS: ACHING;SORE;SHARP
DESCRIPTORS: ACHING;CONSTANT
DESCRIPTORS: ACHING;CONSTANT;DISCOMFORT
DESCRIPTORS: ACHING;SHARP
DESCRIPTORS: ACHING;CONSTANT;DISCOMFORT

## 2017-01-01 ASSESSMENT — ANXIETY QUESTIONNAIRES
GAD7 TOTAL SCORE: 1
2. NOT BEING ABLE TO STOP OR CONTROL WORRYING: NOT AT ALL
3. WORRYING TOO MUCH ABOUT DIFFERENT THINGS: NOT AT ALL
1. FEELING NERVOUS, ANXIOUS, OR ON EDGE: NOT AT ALL
5. BEING SO RESTLESS THAT IT IS HARD TO SIT STILL: NOT AT ALL
7. FEELING AFRAID AS IF SOMETHING AWFUL MIGHT HAPPEN: NOT AT ALL
6. BECOMING EASILY ANNOYED OR IRRITABLE: NOT AT ALL
GAD7 TOTAL SCORE: 1

## 2017-01-01 ASSESSMENT — ACTIVITIES OF DAILY LIVING (ADL): ADLS_ACUITY_SCORE: 11

## 2017-01-01 ASSESSMENT — PATIENT HEALTH QUESTIONNAIRE - PHQ9
5. POOR APPETITE OR OVEREATING: SEVERAL DAYS
SUM OF ALL RESPONSES TO PHQ QUESTIONS 1-9: 15
SUM OF ALL RESPONSES TO PHQ QUESTIONS 1-9: 4

## 2017-01-01 ASSESSMENT — PAIN SCALES - GENERAL
PAINLEVEL: MILD PAIN (2)
PAINLEVEL: NO PAIN (0)
PAINLEVEL: MODERATE PAIN (4)

## 2017-01-01 ASSESSMENT — LIFESTYLE VARIABLES: TOBACCO_USE: 0

## 2017-01-24 NOTE — NURSING NOTE
"Chief Complaint   Patient presents with     Diabetes     Hypertension     Lipids       Initial /66 mmHg  Pulse 76  Temp(Src) 97.5  F (36.4  C) (Oral)  Resp 16  Wt 156 lb (70.761 kg)  SpO2 99% Estimated body mass index is 24.43 kg/(m^2) as calculated from the following:    Height as of 10/28/16: 5' 7.01\" (1.702 m).    Weight as of this encounter: 156 lb (70.761 kg).  BP completed using cuff size: regular    Deyanira Hidalgo CMA.      "

## 2017-01-24 NOTE — PROGRESS NOTES
SUBJECTIVE:                                                    Bora Metzger is a 86 year old male who presents to clinic today for the following health issues:        Diabetes Follow-up    Patient is checking blood sugars: once daily.  Results are as follows:         am - 118-125    Diabetic concerns: None     Symptoms of hypoglycemia (low blood sugar): none     Paresthesias (numbness or burning in feet) or sores: Yes tingling pain on and off, wakes him up, then takes lorazepam and can get back to sleep, during the day he puts up with it because he doesn't want to take lorazepam during the day, he takes tramadol, helps somewhat     Date of last diabetic eye exam: 12/2016     Chronic pain-stopped the duloxetine. ? Why. Was tolerating it. Thinks that it helped this leg pain.     med hx, family hx, and soc hx reviewed and updated     No change in his chest pain which is more chronic. Has cards follow up soon.     OBJECTIVE: /66 mmHg  Pulse 76  Temp(Src) 97.5  F (36.4  C) (Oral)  Resp 16  Wt 156 lb (70.761 kg)  SpO2 99% no apparent distress less anxious than he usually is .  Exam:  EYES: Eyes grossly normal to inspection  HENT: ear canals and TM's normal and nose and mouth without ulcers or lesions  RESP: lungs clear to auscultation - no rales, rhonchi or wheezes  CV: regular rates and rhythm, normal S1 S2, no S3 or S4 and no murmur, click or rub -  ABDOMEN:  soft, nontender, no HSM or masses and bowel sounds normal  Foot exam completed: normal DP and PT pulses, no trophic changes or ulcerative lesions,normal monofilament exam, except over the great and middle toes bilaterall.          ICD-10-CM    1. Chronic pain syndrome G89.4 DULoxetine (CYMBALTA) 20 MG EC capsule   2. Type 2 diabetes mellitus with diabetic polyneuropathy, without long-term current use of insulin (H) E11.42    3. Bilateral claudication of lower limb (H) I73.9       Patient Instructions   Add back in the cymbalta at 20mg nightly.      See me again in a month.     We can consier increasing the cymbalta then and recheck a1c.

## 2017-01-24 NOTE — MR AVS SNAPSHOT
After Visit Summary   1/24/2017    Bora Metzger    MRN: [id number]           Patient Information     Date Of Birth          1/15/1931        Visit Information        Provider Department      1/24/2017 10:20 AM Bora Hardwick MD [facility]        Today's Diagnoses     Chronic pain syndrome    -  1     Type 2 diabetes mellitus with diabetic polyneuropathy, without long-term current use of insulin (H)           Care Instructions    Add back in the cymbalta at 20mg nightly.     See me again in a month.             Follow-ups after your visit        Your next 10 appointments already scheduled     Feb 07, 2017  3:15 PM   Return Visit with Choco Carr MD   [facility] PHYSIC[facility] AT [place] ([facility])    [address] [place] [id number] [phone]              Who to contact     If you have questions or need follow up information about today's clinic visit or your schedule please contact [facility] directly at [phone].  Normal or non-critical lab and imaging results will be communicated to you by PEERhart, letter or phone within 4 business days after the clinic has received the results. If you do not hear from us within 7 days, please contact the clinic through PEERhart or phone. If you have a critical or abnormal lab result, we will notify you by phone as soon as possible.  Submit refill requests through Tranzlogic or call your pharmacy and they will forward the refill request to us. Please allow 3 business days for your refill to be completed.          Additional Information About Your Visit        PEERhart Information     [employer] gives you secure access to your electronic health record. If you see a primary care provider, you can also send messages to your care team and make appointments. If you have questions, please call your primary care clinic.  If you do not have a primary care provider, please  call [phone] and they will assist you.        Care EveryWhere ID     This is your Care EveryWhere ID. This could be used by other organizations to access your [place] medical records  [id number]        Your Vitals Were     Pulse Temperature Respirations Pulse Oximetry          76 97.5  F (36.4  C) (Oral) 16 99%         Blood Pressure from Last 3 Encounters:   01/24/17 116/66   11/15/16 106/50   10/28/16 106/62    Weight from Last 3 Encounters:   01/24/17 156 lb (70.761 kg)   11/15/16 155 lb (70.308 kg)   10/28/16 156 lb 11.2 oz (71.079 kg)              Today, you had the following     No orders found for display         Today's Medication Changes          These changes are accurate as of: 1/24/17 11:21 AM.  If you have any questions, ask your nurse or doctor.               These medicines have changed or have updated prescriptions.        Dose/Directions    DULoxetine 20 MG EC capsule   Commonly known as:  CYMBALTA   This may have changed:  additional instructions   Used for:  Chronic pain syndrome   Changed by:  Bora Hardwick MD        Dose:  20 mg   Take 1 capsule (20 mg) by mouth daily   Quantity:  90 capsule   Refills:  1       * nitroglycerin 0.4 MG sublingual tablet   Commonly known as:  NITROSTAT   This may have changed:  Another medication with the same name was removed. Continue taking this medication, and follow the directions you see here.   Used for:  Chronic ischemic heart disease, unspecified   Changed by:  Bora Hardwick MD        1 TAB EVERY 5 MIN AS NEEDED, UP TO 3 PER EPISODE - Pt may  4 bottles of 25 at a time   Quantity:  100 tablet   Refills:  3       * nitroglycerin 0.4 MG/HR 24 hr patch   Commonly known as:  NITRODUR   This may have changed:  Another medication with the same name was removed. Continue taking this medication, and follow the directions you see here.   Used for:  Chronic atrial fibrillation (H)   Changed by:  Choco Carr MD        Dose:  1 patch    Place 1 patch onto the skin daily   Quantity:  30 patch   Refills:  12       * Notice:  This list has 2 medication(s) that are the same as other medications prescribed for you. Read the directions carefully, and ask your doctor or other care provider to review them with you.         Where to get your medicines      These medications were sent to [id number] - [place], [place] - [address]  [address] [address]     Phone:  [phone]    - DULoxetine 20 MG EC capsule             Primary Care Provider Office Phone # Fax #    Bora Hardwick [phone]       [facility] [address]  [facility] [address]        Thank you!     Thank you for choosing [facility]  for your care. Our goal is always to provide you with excellent care. Hearing back from our patients is one way we can continue to improve our services. Please take a few minutes to complete the written survey that you may receive in the mail after your visit with us. Thank you!             Your Updated Medication List - Protect others around you: Learn how to safely use, store and throw away your medicines at www.disposemymeds.org.          This list is accurate as of: 1/24/17 11:21 AM.  Always use your most recent med list.                   Brand Name Dispense Instructions for use    aspirin 81 MG tablet     100 tablet    Take 1 tablet (81 mg) by mouth daily       blood glucose monitoring lancets     1 Box    Use to test blood sugar 1 times daily or as directed.       blood glucose monitoring meter device kit     1 kit    Use to test blood sugar 1 times daily or as directed.       * blood glucose monitoring test strip    no brand specified    100 each    1 strip by In Vitro route daily Comfort curve test stripes       * blood glucose monitoring test strip    ACCU-CHEK SMILEY PLUS    100 each    Use to test blood sugar 1 times daily or as directed.        clopidogrel 75 MG tablet    PLAVIX    90 tablet    Take 1 tablet (75 mg) by mouth daily       COENZYME Q-10 PO      Take 1 tablet by mouth daily       DULoxetine 20 MG EC capsule    CYMBALTA    90 capsule    Take 1 capsule (20 mg) by mouth daily       evolocumab 140 MG/ML prefilled autoinjector    REPATHA    2 each    Inject 1 mL (140 mg) Subcutaneous every 14 days       glipiZIDE 2.5 MG 24 hr tablet    glipiZIDE XL    180 tablet    Take 1 tablet (2.5 mg) by mouth 2 times daily       HYDROcodone-acetaminophen 5-325 MG per tablet    NORCO    90 tablet    Take 1 tablet by mouth every 6 hours as needed for pain #90 for three months supply.  Do not take with tramadol. (Patient only takes when tramadol does not work)       hydrocortisone 2.5 % cream    ANUSOL-HC    30 g    Place rectally 2 times daily as needed for hemorrhoids Do not apply for more that two weeks at a time       levocetirizine 5 MG tablet    XYZAL    90 tablet    Take 1 tablet (5 mg) by mouth every evening       levothyroxine 75 MCG tablet    SYNTHROID/LEVOTHROID    90 tablet    Take 1 tablet (75 mcg) by mouth daily       * lidocaine 5 % ointment    XYLOCAINE    50 g    Apply topically as needed for moderate pain       * lidocaine 5 % Patch    LIDODERM    30 patch    Place 1 patch onto the skin as needed Over painful areas       lisinopril 2.5 MG tablet    PRINIVIL/Zestril    180 tablet    Take 1 tablet (2.5 mg) by mouth every evening       LORazepam 0.5 MG tablet    ATIVAN    60 tablet    Take 1-2 tablets (0.5-1 mg) by mouth nightly as needed Take 1-2 tabs by mouth as needed for anxiety and or sleeping-       metFORMIN 500 MG 24 hr tablet    GLUCOPHAGE-XR    270 tablet    Take 1 tab in the morning with breakfast and 2 tabs with dinner.       * metoprolol 50 MG tablet    LOPRESSOR    180 tablet    Take po daily in AM 1  tablets  50 mg and a 25 mg tablet for 75 total ---- and 1 tablet 50 mg in the PM       * metoprolol 25 MG tablet    LOPRESSOR    90  tablet    25 mg in the am. Takes along with a 50mg mteoprolol as well in the am. Also takes a 50mg pill in the pm.       Multi-vitamin Tabs tablet      Take 1 tablet by mouth daily       * nitroglycerin 0.4 MG sublingual tablet    NITROSTAT    100 tablet    1 TAB EVERY 5 MIN AS NEEDED, UP TO 3 PER EPISODE - Pt may  4 bottles of 25 at a time       * nitroglycerin 0.4 MG/HR 24 hr patch    NITRODUR    30 patch    Place 1 patch onto the skin daily       tiZANidine 2 MG tablet    ZANAFLEX    30 tablet    Take 0.5-1 tablets (1-2 mg) by mouth 2 times daily as needed for muscle spasms       traMADol-acetaminophen 37.5-325 MG per tablet    ULTRACET    120 tablet    Two every morning and two at dinnertime as needed , discussed/knows not to use with vicodin since both had acetaminophen and would be over-sedating.       * Notice:  This list has 8 medication(s) that are the same as other medications prescribed for you. Read the directions carefully, and ask your doctor or other care provider to review them with you.

## 2017-02-07 NOTE — LETTER
2017      Bora Hardwick MD    [facility]    [address] [place]  [address]       RE: Bora Metzger   MRN: [id number]   : 01/15/1931      Dear Dr. Hardwick:      I had the pleasure of following up on our mutual patient, Boar Metzger, As you recall, he is a most pleasant 86-year-old gentleman.  He is accompanied by his wife.  He unfortunately has diffuse coronary disease with multiple areas of significant narrowing to the point that we are recommending medical therapy as opposed to any type of intervention or surgery.  He also has chronic pain syndrome from arthritis, particularly his left shoulder is one of the bigger issues.  He, as you remember, had bypass surgery back in  and has intermittent stenting, but the last angiogram suggested ongoing medical therapy.  He could not tolerate any of the statin medicines.  He was placed on Repatha, which is the new injectable PCSK9 drug.  With it, his cholesterol numbers are remarkable.  He has a full physical with you next month.  I will ask if you could kindly check electrolytes and lipids and forward a copy to us.  Unfortunately, he was on a 1-year kezia for the Repatha, and we are going to have to see what happens if that can be extended, because the cost the drug is somewhat prohibitive for the family.  If he has to come off the Repatha, I would think going to ultra-low dose Crestor with Zetia might be an option, even though it is not going to get his numbers normalized.  He freely admits that his angina is better.  Interestingly, he told me a story that he has a lidocaine patch and he also has a nitroglycerin patch.  Somewhere along the line, he was told that he was to wear the nitroglycerin patch 24 hours a day and alternate one shoulder to the other.  That is actually not standard.  What is standard is 12-14 hours of nitroglycerin patch and then off for 10-12 hours, and the patch can be placed anywhere.  When he told me that there  was a difference when he wore the patch on 1 side versus the other (because he is alternating with the lidocaine patch), it makes me wonder even stronger that this pain he is having is really his shoulder -- that would make perfect sense.  I explained to him that the nitroglycerin patch will work anywhere on his central body core, but the lidocaine patch is only going to work in the area it is covering for pain control, and that would make perfect sense why his shoulder pain gets worse on 1 day but not the other.  After we clarified this, he is going to give it a try.  Otherwise, he is doing well.  Today's visit was mostly to review the cholesterol numbers and to talk about the Repatha and his concern about what to do if the kezia wears off.  I gave him our resource to call when the time comes.              This was a 30-minute visit, greater than 50% counseling.      Sincerely,            Choco Carr MD

## 2017-02-07 NOTE — PROGRESS NOTES
HPI and Plan:   See dictation    Orders Placed This Encounter   Procedures     Follow-Up with Cardiac Advanced Practice Provider     Orders Placed This Encounter   Medications     metoprolol (LOPRESSOR) 25 MG tablet     Sig: Take 25 mg by mouth Take 1 tab in am along with a 50 mg tab to equal 75 mg     metoprolol (LOPRESSOR) 50 MG tablet     Sig: Take 50 mg by mouth 2 times daily Take along with a 25 mg tablet in am to equal 75 mg, 1 tab in pm     levothyroxine (SYNTHROID/LEVOTHROID) 25 MCG tablet     Sig: Take 25 mcg by mouth daily     Medications Discontinued During This Encounter   Medication Reason     metoprolol (LOPRESSOR) 25 MG tablet Medication Reconciliation Clean Up     metoprolol (LOPRESSOR) 50 MG tablet Medication Reconciliation Clean Up     levothyroxine (SYNTHROID, LEVOTHROID) 75 MCG tablet Medication Reconciliation Clean Up         Encounter Diagnoses   Name Primary?     Coronary artery disease due to lipid rich plaque      Hypertension goal BP (blood pressure) < 140/90 Yes     Hyperlipidemia LDL goal <70        CURRENT MEDICATIONS:  Current Outpatient Prescriptions   Medication Sig Dispense Refill     metoprolol (LOPRESSOR) 25 MG tablet Take 25 mg by mouth Take 1 tab in am along with a 50 mg tab to equal 75 mg       metoprolol (LOPRESSOR) 50 MG tablet Take 50 mg by mouth 2 times daily Take along with a 25 mg tablet in am to equal 75 mg, 1 tab in pm       levothyroxine (SYNTHROID/LEVOTHROID) 25 MCG tablet Take 25 mcg by mouth daily       nitroglycerin (NITRODUR) 0.4 MG/HR 24 hr patch Place 1 patch onto the skin daily 30 patch 12     lidocaine (LIDODERM) 5 % patch Place 1 patch onto the skin as needed Over painful areas 30 patch 5     metFORMIN (GLUCOPHAGE-XR) 500 MG 24 hr tablet Take 1 tab in the morning with breakfast and 2 tabs with dinner. 270 tablet 3     lisinopril (PRINIVIL,ZESTRIL) 2.5 MG tablet Take 1 tablet (2.5 mg) by mouth every evening 180 tablet 2     nitroglycerin (NITROSTAT) 0.4 MG SL  tablet 1 TAB EVERY 5 MIN AS NEEDED, UP TO 3 PER EPISODE - Pt may  4 bottles of 25 at a time 100 tablet 3     glipiZIDE (GLIPIZIDE XL) 2.5 MG 24 hr tablet Take 1 tablet (2.5 mg) by mouth 2 times daily 180 tablet 2     traMADol-acetaminophen (ULTRACET) 37.5-325 MG per tablet Two every morning and two at dinnertime as needed , discussed/knows not to use with vicodin since both had acetaminophen and would be over-sedating. 120 tablet 2     HYDROcodone-acetaminophen (NORCO) 5-325 MG per tablet Take 1 tablet by mouth every 6 hours as needed for pain #90 for three months supply.  Do not take with tramadol. (Patient only takes when tramadol does not work) 90 tablet 0     LORazepam (ATIVAN) 0.5 MG tablet Take 1-2 tablets (0.5-1 mg) by mouth nightly as needed Take 1-2 tabs by mouth as needed for anxiety and or sleeping- 60 tablet 5     clopidogrel (PLAVIX) 75 MG tablet Take 1 tablet (75 mg) by mouth daily 90 tablet 3     lidocaine (XYLOCAINE) 5 % ointment Apply topically as needed for moderate pain 50 g 3     tiZANidine (ZANAFLEX) 2 MG tablet Take 0.5-1 tablets (1-2 mg) by mouth 2 times daily as needed for muscle spasms 30 tablet 1     evolocumab (REPATHA) 140 MG/ML prefilled autoinjector Inject 1 mL (140 mg) Subcutaneous every 14 days 2 each 12     levocetirizine (XYZAL) 5 MG tablet Take 1 tablet (5 mg) by mouth every evening 90 tablet 2     hydrocortisone (ANUSOL-HC) 2.5 % rectal cream Place rectally 2 times daily as needed for hemorrhoids Do not apply for more that two weeks at a time 30 g 2     COENZYME Q-10 PO Take 1 tablet by mouth daily       aspirin 81 MG tablet Take 1 tablet (81 mg) by mouth daily 100 tablet 1     multivitamin, therapeutic with minerals (MULTI-VITAMIN) TABS Take 1 tablet by mouth daily       DULoxetine (CYMBALTA) 20 MG EC capsule Take 1 capsule (20 mg) by mouth daily 90 capsule 1     blood glucose monitoring (ACCU-CHEK SMILEY PLUS) test strip Use to test blood sugar 1 times daily or as  "directed. 100 each 11     blood glucose monitoring (SOFTCLIX) lancets Use to test blood sugar 1 times daily or as directed. 1 Box 11     blood glucose monitoring (ACCU-CHEK SMILEY PLUS) meter device kit Use to test blood sugar 1 times daily or as directed. 1 kit 0     glucose blood VI test strips strip 1 strip by In Vitro route daily Comfort curve test stripes 100 each 3       ALLERGIES     Allergies   Allergen Reactions     Insulin Anaphylaxis     Had this in hospital.  Unsure of which insulin.   \"went crazy\"    Caused shock.     Amlodipine Besylate      Heart pounds uncontrollably      Contrast Dye      Developed itchy rash .     Crestor [Rosuvastatin Calcium]      Severe muscle pains     Gabapentin Diarrhea     Hmg-Coa-R Inhibitors      Levalo-- reaction, ended up in the hospital     Livalo [Pitavastatin]      After 7 days --severe muscle pains, joint pains.     Naprosyn [Naproxen]      Stomach disorder     Penicillins      rash     Welchol [Colesevelam] GI Disturbance     Ineffective on cholesterol     Zetia [Ezetimibe] Cramps     Ineffective on cholesterol       PAST MEDICAL HISTORY:  Past Medical History   Diagnosis Date     Other and unspecified hyperlipidemia      statin intolerance     Type II or unspecified type diabetes mellitus without mention of complication, not stated as uncontrolled      Malignant neoplasm of prostate (H)      Essential hypertension, benign      Unspecified hearing loss      Herpes zoster without mention of complication 2007     History of radiation therapy      prostate, 2001 (37 treatments)     Arthritis      Stented coronary artery      prior to CAB: Lad, RI, brachyRx     Hypothyroidism      Atrial fibrillation (H)      Coronary artery disease      [facility] rec lifelong plavix due to diffuse CAD     Postsurgical aortocoronary bypass status 2004     Nation-Lad, VG-RI, VG-RCA (cath 2011 grafts open, only potential ischemia prox Lad before [place])     Acute myocardial infarction, unspecified " site, episode of care unspecified 1995     Carotid artery disease (H)      right carotid endarterectomy 2009     Bladder cancer (H) 2013     ED (erectile dysfunction)      since surgery and XRT     Osteoporosis      Bilateral claudication of lower limb (H)      Epididymitis      Arrhythmia      PAC, PVC     Chronic pain syndrome      Anxiety      NSTEMI (non-ST elevated myocardial infarction) (H) 9/2015 9/2015 Heart cath - severe native vessel CAD, patent LIMA to LAD and SVG to RCA, occluded SVG to 1st diagonal (likely chronic)       PAST SURGICAL HISTORY:  Past Surgical History   Procedure Laterality Date     C cabg, arterial, three  2004     triple lad, ri, rca     Appendectomy  1970     Cholecystectomy, open  1987     Surgical history of -        wrist surgery     Tonsillectomy       Hernia repair, inguinal rt/lt  1951     and 2007     Hc repair of nasal septum  1994     Surgical history of -   3/2/2011     Transurethral resection of bladder tumors     Hc coronary stent percut, ea addtl vessel       6 total, last 2003     Turp  2001     Eyelid surgery  2009     Bcg treatment  2011-12 12 total     Cystoscopy, biopsy bladder, combined  3/28/2013     Procedure: COMBINED CYSTOSCOPY, BIOPSY BLADDER;  CYSTOSCOPY, BLADDER BIOPSY, FULGURATION ;  Surgeon: Rafat Pride MD;  Location:  OR     Cystoscopy, fulgurate bladder tumor, combined  3/28/2013     Procedure: COMBINED CYSTOSCOPY, FULGURATE BLADDER TUMOR;;  Surgeon: Rafat Pride MD;  Location:  OR     Coronary artery bypass  1/2004      CABG, Nation-Lad, Vg-RI, VG-RCA (due to diffuse dz, rec life long plavix)     Carotid endarterectomy  2009     right side     Hc removal anal fistula,subcutaneous       Heart cath, angioplasty  6/2003      BrachyTx RI (?new stent also)     Heart cath, angioplasty  5/2003      Stent RI, GEORGE stent distal LAD (60%RCA)     Heart cath, angioplasty  4/2003     PTCA-RI     Heart cath, angioplasty  2000     LAD Stent     Coronary  angiography adult order  2011     all grafts open (only poss area of ischemia is Lad origin closed and [place] to Lad ok but small prox section filled via diag graft retro)     Coronary angiography adult order  2004     Colonoscopy N/A 2015     Procedure: COMBINED COLONOSCOPY, SINGLE OR MULTIPLE BIOPSY/POLYPECTOMY BY BIOPSY;  Surgeon: Kenroy Licona MD;  Location:  GI     Endarterectomy carotid   R CEA      Coronary angiography adult order  2015     severe native vessel CAD, patent LIMA to LAD and SVG to RCA, occluded SVG to 1st diagonal (likely chronic     Coronary artery bypass  2004     LIMA=LAD, SVG=Ramus, SVG=Right     Colonoscopy N/A 10/28/2016     Procedure: COMBINED COLONOSCOPY, SINGLE OR MULTIPLE BIOPSY/POLYPECTOMY BY BIOPSY;  Surgeon: Kenroy Licona MD;  Location:  GI       FAMILY HISTORY:  Family History   Problem Relation Age of Onset     Heart Failure Mother      Coronary Artery Disease Mother       age 101 congestive heart attack     Hypertension Mother      Hyperlipidemia Mother      Thyroid Disease Mother      Myocardial Infarction Father      Coronary Artery Disease Father       age 83 heart attack     Hypertension Father      Hyperlipidemia Father      Prostate Cancer Father      Anesthesia Reaction Father      CEREBROVASCULAR DISEASE No family hx of      DIABETES Brother       age 51 drugs/alcohol       SOCIAL HISTORY:  Social History     Social History     Marital Status:      Spouse Name: maicol      Number of Children: N/A     Years of Education: 18     Occupational History            retired     Social History Main Topics     Smoking status: Never Smoker      Smokeless tobacco: Never Used     Alcohol Use: Yes      Comment: 6 beers/yr.     Drug Use: No     Sexual Activity: No     Other Topics Concern     Caffeine Concern Yes     occasional coffee or a diet coke     Sleep Concern No     Stress Concern Yes     related to health concerns  "    Weight Concern No     Special Diet No     Exercise Yes     not much     Seat Belt Yes     Parent/Sibling W/ Cabg, Mi Or Angioplasty Before 65f 55m? No     father & mother now      Social History Narrative    Patient live independently with his spouse. He is a retired .       Review of Systems:  Skin:  Positive for bruising   Eyes:  Negative    ENT:  Positive for hearing loss  Respiratory:  Negative    Cardiovascular:    Positive for;chest pain;dizziness  Gastroenterology: Positive for poor appetite;constipation  Genitourinary:  Positive for prostate problem;nocturia  Musculoskeletal:  Positive for    Neurologic:  Positive for headaches;numbness or tingling of feet  Psychiatric:  Positive for excessive stress;depression;sleep disturbances  Heme/Lymph/Imm:  Positive for easy bruising;allergies  Endocrine:  Positive for thyroid disorder;diabetes    Physical Exam:  Vitals: /48 mmHg  Pulse 70  Ht 1.702 m (5' 7.01\")  Wt 72.712 kg (160 lb 4.8 oz)  BMI 25.10 kg/m2    Constitutional:  cooperative, alert and oriented, well developed, well nourished, in no acute distress        Skin:  warm and dry to the touch, no apparent skin lesions or masses noted        Head:  normocephalic, no masses or lesions        Eyes:  pupils equal and round, conjunctivae and lids unremarkable, sclera white, no xanthalasma, EOMS intact, no nystagmus        ENT:  no pallor or cyanosis, dentition good        Neck:           Chest:  normal breath sounds, clear to auscultation, normal A-P diameter, normal symmetry, normal respiratory excursion, no use of accessory muscles          Cardiac: regular rhythm;normal S1 and S2       grade 1;systolic ejection murmur          Abdomen:  abdomen soft, non-tender, BS normoactive, no mass, no HSM, no bruits        Vascular:                                          Extremities and Back:  no edema              Neurological:  affect appropriate, oriented to time, person and " place          Recent Lab Results:  LIPID RESULTS:  Lab Results   Component Value Date    CHOL 131 04/08/2016    HDL 48 04/08/2016    LDL 52 04/08/2016    * 06/27/2012    TRIG 153* 04/08/2016    CHOLHDLRATIO 4.7 09/22/2015       LIVER ENZYME RESULTS:  Lab Results   Component Value Date    AST 22 09/21/2015    ALT <5* 04/08/2016       CBC RESULTS:  Lab Results   Component Value Date    WBC 9.8 02/27/2016    RBC 4.25* 02/27/2016    HGB 12.3* 02/27/2016    HCT 37.2* 02/27/2016    MCV 88 02/27/2016    MCH 28.9 02/27/2016    MCHC 33.1 02/27/2016    RDW 14.6 02/27/2016     02/27/2016       BMP RESULTS:  Lab Results   Component Value Date     02/27/2016    POTASSIUM 4.4 02/27/2016    CHLORIDE 105 02/27/2016    CO2 28 02/27/2016    ANIONGAP 7 02/27/2016    GLC 88 02/27/2016    BUN 14 02/27/2016    CR 1.00 02/27/2016    GFRESTIMATED 52* 04/28/2016    GFRESTBLACK 63 04/28/2016    YUAN 8.7 02/27/2016        A1C RESULTS:  Lab Results   Component Value Date    A1C 7.1* 11/15/2016       INR RESULTS:  Lab Results   Component Value Date    INR 0.99 02/27/2016    INR 1.01 09/11/2015           KEYANA Boyer APRN CNP  [facility]  [id number]     [place] [id number]

## 2017-02-08 NOTE — TELEPHONE ENCOUNTER
Pt's wife called requesting new RX for repatha and inquiring if there is any other assistance for this to help with cost. Informed wife that [facility] gets money but she will have to call regularly to see if there is any money. Also did send her new information on [employer] for kezia money, and sent script to  specialty pharmacy for further assistance. KEREN Rivas RN

## 2017-02-08 NOTE — PROGRESS NOTES
2017      Bora Hardwick MD    [facility]    [address] [place]  [address]       RE: Bora Metzger   MRN: [id number]   : 01/15/1931      Dear Dr. Hardwick:      I had the pleasure of following up on our mutual patient, Bora Metzger, As you recall, he is a most pleasant 86-year-old gentleman.  He is accompanied by his wife.  He unfortunately has diffuse coronary disease with multiple areas of significant narrowing to the point that we are recommending medical therapy as opposed to any type of intervention or surgery.  He also has chronic pain syndrome from arthritis, particularly his left shoulder is one of the bigger issues.  He, as you remember, had bypass surgery back in  and has intermittent stenting, but the last angiogram suggested ongoing medical therapy.  He could not tolerate any of the statin medicines.  He was placed on Repatha, which is the new injectable PCSK9 drug.  With it, his cholesterol numbers are remarkable.  He has a full physical with you next month.  I will ask if you could kindly check electrolytes and lipids and forward a copy to us.  Unfortunately, he was on a 1-year kezia for the Repatha, and we are going to have to see what happens if that can be extended, because the cost the drug is somewhat prohibitive for the family.  If he has to come off the Repatha, I would think going to ultra-low dose Crestor with Zetia might be an option, even though it is not going to get his numbers normalized.  He freely admits that his angina is better.  Interestingly, he told me a story that he has a lidocaine patch and he also has a nitroglycerin patch.  Somewhere along the line, he was told that he was to wear the nitroglycerin patch 24 hours a day and alternate one shoulder to the other.  That is actually not standard.  What is standard is 12-14 hours of nitroglycerin patch and then off for 10-12 hours, and the patch can be placed anywhere.  When he told me that there  was a difference when he wore the patch on 1 side versus the other (because he is alternating with the lidocaine patch), it makes me wonder even stronger that this pain he is having is really his shoulder -- that would make perfect sense.  I explained to him that the nitroglycerin patch will work anywhere on his central body core, but the lidocaine patch is only going to work in the area it is covering for pain control, and that would make perfect sense why his shoulder pain gets worse on 1 day but not the other.  After we clarified this, he is going to give it a try.  Otherwise, he is doing well.  Today's visit was mostly to review the cholesterol numbers and to talk about the Repatha and his concern about what to do if the kezia wears off.  I gave him our resource to call when the time comes.      This was a 30-minute visit, greater than 50% counseling.      Sincerely,            MD QIANA Zapien MD             D: 2017 16:24   T: 2017 08:49   MT: [id number]      Name:     WAQAR SLAUGHTER   MRN:      [id number]        Account:      [id number]   :      01/15/1931           Service Date: 2017      Document: [id number]

## 2017-02-10 NOTE — TELEPHONE ENCOUNTER
Spoke with specialty pharmacy. Reviewed med coverage and per [facility] specialty Pt has 0 co-pay on Repatha. Wife informed of this information and asked to call [facility] and stop shipment if they are not showing 0 co-pay. KEREN Rivas rN

## 2017-02-11 NOTE — PROGRESS NOTES
"SUBJECTIVE:   Bora Metzger is a 86 year old male presenting with a chief complaint of complains of sore throat, feverish, nasal congestion, some coughing for the last two days  Patient complains of sinus pressures from the maxilary to the throat for the last 2 days ago.  Patient's wife states her 's temperature is 102 last night and 100.5 this morning  Course of illness is worsening.    Current and Associated symptoms: fever, nasal congestion, rhinorrhea, \"cold symptoms\", cough  and sore throat  Treatment measures tried include Cepcal for the throat and tiny sip of Dayquil   Predisposing factors include None.  Past Medical History   Diagnosis Date     Other and unspecified hyperlipidemia      statin intolerance     Type II or unspecified type diabetes mellitus without mention of complication, not stated as uncontrolled      Malignant neoplasm of prostate (H)      Essential hypertension, benign      Unspecified hearing loss      Herpes zoster without mention of complication 2007     History of radiation therapy      prostate, 2001 (37 treatments)     Arthritis      Stented coronary artery      prior to CAB: Lad, RI, brachyRx     Hypothyroidism      Atrial fibrillation (H)      Coronary artery disease      CVS rec lifelong plavix due to diffuse CAD     Postsurgical aortocoronary bypass status 2004     Nation-Lad, VG-RI, VG-RCA (cath 2011 grafts open, only potential ischemia prox Lad before [place])     Acute myocardial infarction, unspecified site, episode of care unspecified 1995     Carotid artery disease (H)      right carotid endarterectomy 2009     Bladder cancer (H) 2013     ED (erectile dysfunction)      since surgery and XRT     Osteoporosis      Bilateral claudication of lower limb (H)      Epididymitis      Arrhythmia      PAC, PVC     Chronic pain syndrome      Anxiety      NSTEMI (non-ST elevated myocardial infarction) (H) 9/2015 9/2015 Heart cath - severe native vessel CAD, patent LIMA to LAD " and SVG to RCA, occluded SVG to 1st diagonal (likely chronic)     Current Outpatient Prescriptions   Medication Sig Dispense Refill     evolocumab (REPATHA) 140 MG/ML prefilled autoinjector Inject 1 mL (140 mg) Subcutaneous every 14 days 2 each 12     metoprolol (LOPRESSOR) 25 MG tablet Take 25 mg by mouth Take 1 tab in am along with a 50 mg tab to equal 75 mg       metoprolol (LOPRESSOR) 50 MG tablet Take 50 mg by mouth 2 times daily Take along with a 25 mg tablet in am to equal 75 mg, 1 tab in pm       levothyroxine (SYNTHROID/LEVOTHROID) 25 MCG tablet Take 25 mcg by mouth daily       DULoxetine (CYMBALTA) 20 MG EC capsule Take 1 capsule (20 mg) by mouth daily 90 capsule 1     nitroglycerin (NITRODUR) 0.4 MG/HR 24 hr patch Place 1 patch onto the skin daily 30 patch 12     lidocaine (LIDODERM) 5 % patch Place 1 patch onto the skin as needed Over painful areas 30 patch 5     metFORMIN (GLUCOPHAGE-XR) 500 MG 24 hr tablet Take 1 tab in the morning with breakfast and 2 tabs with dinner. 270 tablet 3     lisinopril (PRINIVIL,ZESTRIL) 2.5 MG tablet Take 1 tablet (2.5 mg) by mouth every evening 180 tablet 2     nitroglycerin (NITROSTAT) 0.4 MG SL tablet 1 TAB EVERY 5 MIN AS NEEDED, UP TO 3 PER EPISODE - Pt may  4 bottles of 25 at a time 100 tablet 3     glipiZIDE (GLIPIZIDE XL) 2.5 MG 24 hr tablet Take 1 tablet (2.5 mg) by mouth 2 times daily 180 tablet 2     traMADol-acetaminophen (ULTRACET) 37.5-325 MG per tablet Two every morning and two at dinnertime as needed , discussed/knows not to use with vicodin since both had acetaminophen and would be over-sedating. 120 tablet 2     HYDROcodone-acetaminophen (NORCO) 5-325 MG per tablet Take 1 tablet by mouth every 6 hours as needed for pain #90 for three months supply.  Do not take with tramadol. (Patient only takes when tramadol does not work) 90 tablet 0     LORazepam (ATIVAN) 0.5 MG tablet Take 1-2 tablets (0.5-1 mg) by mouth nightly as needed Take 1-2 tabs by mouth  "as needed for anxiety and or sleeping- 60 tablet 5     clopidogrel (PLAVIX) 75 MG tablet Take 1 tablet (75 mg) by mouth daily 90 tablet 3     lidocaine (XYLOCAINE) 5 % ointment Apply topically as needed for moderate pain 50 g 3     tiZANidine (ZANAFLEX) 2 MG tablet Take 0.5-1 tablets (1-2 mg) by mouth 2 times daily as needed for muscle spasms 30 tablet 1     blood glucose monitoring (ACCU-CHEK SMILEY PLUS) test strip Use to test blood sugar 1 times daily or as directed. 100 each 11     blood glucose monitoring (SOFTCLIX) lancets Use to test blood sugar 1 times daily or as directed. 1 Box 11     levocetirizine (XYZAL) 5 MG tablet Take 1 tablet (5 mg) by mouth every evening 90 tablet 2     hydrocortisone (ANUSOL-HC) 2.5 % rectal cream Place rectally 2 times daily as needed for hemorrhoids Do not apply for more that two weeks at a time 30 g 2     COENZYME Q-10 PO Take 1 tablet by mouth daily       aspirin 81 MG tablet Take 1 tablet (81 mg) by mouth daily 100 tablet 1     blood glucose monitoring (ACCU-CHEK SMILEY PLUS) meter device kit Use to test blood sugar 1 times daily or as directed. 1 kit 0     glucose blood VI test strips strip 1 strip by In Vitro route daily Comfort curve test stripes 100 each 3     multivitamin, therapeutic with minerals (MULTI-VITAMIN) TABS Take 1 tablet by mouth daily       Social History   Substance Use Topics     Smoking status: Never Smoker      Smokeless tobacco: Never Used     Alcohol Use: Yes      Comment: 6 beers/yr.       ROS:   INTEGUMENTARY/SKIN: NEGATIVE for rashes,  RESP:NEGATIVE for SOB but wife thinks he does  Cardiac denied chest pain   GI: NEGATIVE for nausea, vomiting or diarrhea    OBJECTIVE  :/70 mmHg  Pulse 101  Temp(Src) 98.8  F (37.1  C) (Oral)  Ht 5' 8\" (1.727 m)  Wt 160 lb (72.576 kg)  BMI 24.33 kg/m2  SpO2 96%  GENERAL APPEARANCE: healthy, alert and no distress  EYES: EOMI,  PERRL, conjunctiva clear  HENT: ear canals and TM's normal.  Nose and mouth with " erythema  NECK: supple, nontender, anterior lymphadenopathy  RESP: lungs clear to auscultation anterior but decrease posterior bases  CV: regular rates and rhythm, normal S1 S2, no murmur noted  NEURO:   normal speech and mentation  SKIN: no suspicious lesions or rashes    ASSESSMENT: Sinusitis     PLAN:  RST negative  Influenza negative  Chest Xray negative  Will treat prophylactic for sore throat and sinus since the WBC  ,WBC     16.1   2/11/2017  RBC     4.27   2/11/2017  HGB     12.1   2/11/2017  HCT     38.3   2/11/2017  No components found with this name: mct  MCV       90   2/11/2017  MCH     28.3   2/11/2017  MCHC     31.6   2/11/2017  RDW     13.8   2/11/2017  PLT      270   2/11/2017  Follow up with primary on Monday 2/13/2017   See orders in Epic  NP fever reoccurs to the emergency department, or symptoms change with in 24 hours

## 2017-02-11 NOTE — MR AVS SNAPSHOT
After Visit Summary   2/11/2017    Bora Metzger    MRN: [id number]           Patient Information     Date Of Birth          1/15/1931        Visit Information        Provider Department      2/11/2017 10:15 AM Penny Wilkerson NP [facility]        Today's Diagnoses     Acute pharyngitis, unspecified etiology    -  1     URI, acute           Care Instructions    IF ANY Short breath, weakness, fever, to hosptial        Follow-ups after your visit        Your next 10 appointments already scheduled     Mar 02, 2017 10:20 AM   SHORT with Bora Hardwick MD   [facility] ([facility]    [address] [facility] [id number] [phone]              Who to contact     If you have questions or need follow up information about today's clinic visit or your schedule please contact [facility] directly at [phone].  Normal or non-critical lab and imaging results will be communicated to you by MyChart, letter or phone within 4 business days after the clinic has received the results. If you do not hear from us within 7 days, please contact the clinic through RightScalehart or phone. If you have a critical or abnormal lab result, we will notify you by phone as soon as possible.  Submit refill requests through BluPanda or call your pharmacy and they will forward the refill request to us. Please allow 3 business days for your refill to be completed.          Additional Information About Your Visit        MyChart Information     BluPanda gives you secure access to your electronic health record. If you see a primary care provider, you can also send messages to your care team and make appointments. If you have questions, please call your primary care clinic.  If you do not have a primary care provider, please call [phone] and they will assist you.        Care EveryWhere ID     This is your Care EveryWhere ID. This could be  "used by other organizations to access your [facility] medical records  [id number]        Your Vitals Were     Pulse Temperature Height BMI (Body Mass Index) Pulse Oximetry       101 98.8  F (37.1  C) (Oral) 5' 8\" (1.727 m) 24.33 kg/m2 96%        Blood Pressure from Last 3 Encounters:   02/11/17 142/70   02/07/17 110/48   01/24/17 116/66    Weight from Last 3 Encounters:   02/11/17 160 lb (72.576 kg)   02/07/17 160 lb 4.8 oz (72.712 kg)   01/24/17 156 lb (70.761 kg)              We Performed the Following     Beta strep group A culture     CBC with platelets differential     Influenza A/B antigen     Rapid strep screen          Today's Medication Changes          These changes are accurate as of: 2/11/17 11:40 AM.  If you have any questions, ask your nurse or doctor.               Start taking these medicines.        Dose/Directions    azithromycin 250 MG tablet   Commonly known as:  ZITHROMAX   Used for:  URI, acute   Started by:  Penny Wilkerson NP        Two tablets first day, then one tablet daily for four days.   Quantity:  6 tablet   Refills:  0            Where to get your medicines      These medications were sent to [facility] [address] [place] [id number]     Phone:  [phone]    - azithromycin 250 MG tablet             Primary Care Provider Office Phone # Fax #    Bora Hardwick [phone]       [address] [address]        Thank you!     Thank you for choosing [facility]  for your care. Our goal is always to provide you with excellent care. Hearing back from our patients is one way we can continue to improve our services. Please take a few minutes to complete the written survey that you may receive in the mail after your visit with us. Thank you!             Your Updated Medication List - Protect others around you: Learn how to safely use, store and " throw away your medicines at www.disposemymeds.org.          This list is accurate as of: 2/11/17 11:40 AM.  Always use your most recent med list.                   Brand Name Dispense Instructions for use    aspirin 81 MG tablet     100 tablet    Take 1 tablet (81 mg) by mouth daily       azithromycin 250 MG tablet    ZITHROMAX    6 tablet    Two tablets first day, then one tablet daily for four days.       blood glucose monitoring lancets     1 Box    Use to test blood sugar 1 times daily or as directed.       blood glucose monitoring meter device kit     1 kit    Use to test blood sugar 1 times daily or as directed.       * blood glucose monitoring test strip    no brand specified    100 each    1 strip by In Vitro route daily Comfort curve test stripes       * blood glucose monitoring test strip    ACCU-CHEK SMILEY PLUS    100 each    Use to test blood sugar 1 times daily or as directed.       clopidogrel 75 MG tablet    PLAVIX    90 tablet    Take 1 tablet (75 mg) by mouth daily       COENZYME Q-10 PO      Take 1 tablet by mouth daily       DULoxetine 20 MG EC capsule    CYMBALTA    90 capsule    Take 1 capsule (20 mg) by mouth daily       evolocumab 140 MG/ML prefilled autoinjector    REPATHA    2 each    Inject 1 mL (140 mg) Subcutaneous every 14 days       glipiZIDE 2.5 MG 24 hr tablet    glipiZIDE XL    180 tablet    Take 1 tablet (2.5 mg) by mouth 2 times daily       HYDROcodone-acetaminophen 5-325 MG per tablet    NORCO    90 tablet    Take 1 tablet by mouth every 6 hours as needed for pain #90 for three months supply.  Do not take with tramadol. (Patient only takes when tramadol does not work)       hydrocortisone 2.5 % cream    ANUSOL-HC    30 g    Place rectally 2 times daily as needed for hemorrhoids Do not apply for more that two weeks at a time       levocetirizine 5 MG tablet    XYZAL    90 tablet    Take 1 tablet (5 mg) by mouth every evening       levothyroxine 25 MCG tablet     SYNTHROID/LEVOTHROID     Take 25 mcg by mouth daily       * lidocaine 5 % ointment    XYLOCAINE    50 g    Apply topically as needed for moderate pain       * lidocaine 5 % Patch    LIDODERM    30 patch    Place 1 patch onto the skin as needed Over painful areas       lisinopril 2.5 MG tablet    PRINIVIL/Zestril    180 tablet    Take 1 tablet (2.5 mg) by mouth every evening       LORazepam 0.5 MG tablet    ATIVAN    60 tablet    Take 1-2 tablets (0.5-1 mg) by mouth nightly as needed Take 1-2 tabs by mouth as needed for anxiety and or sleeping-       metFORMIN 500 MG 24 hr tablet    GLUCOPHAGE-XR    270 tablet    Take 1 tab in the morning with breakfast and 2 tabs with dinner.       * metoprolol 25 MG tablet    LOPRESSOR     Take 25 mg by mouth Take 1 tab in am along with a 50 mg tab to equal 75 mg       * metoprolol 50 MG tablet    LOPRESSOR     Take 50 mg by mouth 2 times daily Take along with a 25 mg tablet in am to equal 75 mg, 1 tab in pm       Multi-vitamin Tabs tablet      Take 1 tablet by mouth daily       * nitroglycerin 0.4 MG sublingual tablet    NITROSTAT    100 tablet    1 TAB EVERY 5 MIN AS NEEDED, UP TO 3 PER EPISODE - Pt may  4 bottles of 25 at a time       * nitroglycerin 0.4 MG/HR 24 hr patch    NITRODUR    30 patch    Place 1 patch onto the skin daily       tiZANidine 2 MG tablet    ZANAFLEX    30 tablet    Take 0.5-1 tablets (1-2 mg) by mouth 2 times daily as needed for muscle spasms       traMADol-acetaminophen 37.5-325 MG per tablet    ULTRACET    120 tablet    Two every morning and two at dinnertime as needed , discussed/knows not to use with vicodin since both had acetaminophen and would be over-sedating.       * Notice:  This list has 8 medication(s) that are the same as other medications prescribed for you. Read the directions carefully, and ask your doctor or other care provider to review them with you.

## 2017-02-11 NOTE — NURSING NOTE
"Chief Complaint   Patient presents with     Urgent Care     Fever     c/o fever and nasal congstion for 2 day       Initial /70 mmHg  Pulse 101  Temp(Src) 98.8  F (37.1  C) (Oral)  Ht 5' 8\" (1.727 m)  Wt 160 lb (72.576 kg)  BMI 24.33 kg/m2  SpO2 96% Estimated body mass index is 24.33 kg/(m^2) as calculated from the following:    Height as of this encounter: 5' 8\" (1.727 m).    Weight as of this encounter: 160 lb (72.576 kg).  Medication Reconciliation: complete   Alisson Antoine MA    "

## 2017-02-20 NOTE — TELEPHONE ENCOUNTER
Message forwarded from Team 6 nurse. Received call from [facility], pt had 2 defective sureclick injections this month. Pharmacy is willing to replace them at no cost to patient. They will need a verbal authorization. Contacted [facility] and spoke with Freida (pharmacist), gave verbal order for 2 Sure click 140mg/ml injections. They will send replacements to patient.

## 2017-03-02 NOTE — PROGRESS NOTES
SUBJECTIVE:                                                    Bora Metzger is a 86 year old male who presents to clinic today for the following health issues:    Chronic pain follow up-continues to have pain in multiple area. The most bother some is his neck. He is taking the vicodin about 90 over 3-4 months with more regular use of the ultram. Taking the cymbalta daily thinks helpful. Has not noted averse effects on a low dose.    CAD-feels chest pain with activity. Usually with walking 10-15 minutes that is relieved with ntg. Feels weak much of the time and lightheaded. bp at home has been in the  range systolic and he wonders if too low.     Diabetes Follow-up      Patient is checking blood sugars: 2 times a week 126-132    Diabetic concerns: None and other - needs fasting full blood pamel     Symptoms of hypoglycemia (low blood sugar): none     Paresthesias (numbness or burning in feet) or sores: No     Date of last diabetic eye exam: 11/2016       Amount of exercise or physical activity: None    Problems taking medications regularly: No    Medication side effects: none  Diet: regular (no restrictions)    No new neuro, const symptoms  symptoms continue but did have clean cystoscopy recently.     OBJECTIVE: BP 98/56 (BP Location: Left arm, Patient Position: Chair, Cuff Size: Adult Regular)  Pulse 80  Temp 97.8  F (36.6  C) (Oral)  Resp 16  Wt 152 lb (68.9 kg)  SpO2 97%  BMI 23.11 kg/m2 Exam:  GENERAL APPEARANCE: healthy, alert and no distress  EYES: Eyes grossly normal to inspection  HENT: ear canals and TM's normal and nose and mouth without ulcers or lesions  NECK: no adenopathy, no asymmetry, masses, or scars and thyroid normal to palpation  RESP: lungs clear to auscultation - no rales, rhonchi or wheezes  CV: regular rates and rhythm, normal S1 S2, no S3 or S4 and no murmur, click or rub -  ABDOMEN:  soft, nontender, no HSM or masses and bowel sounds normal  SKIN: no suspicious lesions or  yahaira  PSYCH: anxious     Results for orders placed or performed in visit on 03/02/17   Hemoglobin A1c   Result Value Ref Range    Hemoglobin A1C 7.0 (H) 4.3 - 6.0 %        ICD-10-CM    1. Type 2 diabetes mellitus with peripheral vascular disease (H) E11.51 Lipid Profile with reflex to direct LDL     Hemoglobin A1c     Comprehensive metabolic panel     Microalbumin quantitative, random urine     CANCELED: Albumin Random Urine Quantitative   2. DDD (degenerative disc disease), cervical M50.30 HYDROcodone-acetaminophen (NORCO) 5-325 MG per tablet   3. Chronic pain syndrome G89.4 HYDROcodone-acetaminophen (NORCO) 5-325 MG per tablet     DULoxetine (CYMBALTA) 20 MG EC capsule   4. Hyperlipidemia LDL goal <70 E78.5    5. 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 Hypertension goal BP (blood pressure) < 140/90 I10 CANCELED: Albumin Random Urine Quantitative   7. Subclinical hypothyroidism E03.9 TSH with free T4 reflex   8. Fatigue, unspecified type R53.83    9. Coronary artery disease of native heart with stable angina pectoris, unspecified vessel or lesion type (H) I25.118    10. Type 2 diabetes mellitus with diabetic polyneuropathy, without long-term current use of insulin (H) E11.42 DULoxetine (CYMBALTA) 20 MG EC capsule    increase cymbalta to 20mg bid which may help the anxiety as well as pain. Consider ECHO to assess for cardiac function. He is unwilling to do a stress test due to past event. Will do labs today. Refilled the vicodin. Cut back on metoprolol to 50mg bid. Cut back on glipizide to 2.5 once daily.

## 2017-03-02 NOTE — NURSING NOTE
"Chief Complaint   Patient presents with     Diabetes       Initial BP 98/56 (BP Location: Left arm, Patient Position: Chair, Cuff Size: Adult Regular)  Pulse 80  Temp 97.8  F (36.6  C) (Oral)  Resp 16  Wt 152 lb (68.9 kg)  SpO2 97%  BMI 23.11 kg/m2 Estimated body mass index is 23.11 kg/(m^2) as calculated from the following:    Height as of 2/11/17: 5' 8\" (1.727 m).    Weight as of this encounter: 152 lb (68.9 kg).  Medication Reconciliation: complete     Deyanira Hidalgo CMA      "

## 2017-03-02 NOTE — PATIENT INSTRUCTIONS
Let have you cut back on the metoprolol to 50mg twice daily.     I will ask Dr Carr about doing an echo test.     Let have you cut back on the glipizide to one pill daily.

## 2017-03-02 NOTE — MR AVS SNAPSHOT
After Visit Summary   3/2/2017    Bora Metzger    MRN: [id number]           Patient Information     Date Of Birth          1/15/1931        Visit Information        Provider Department      3/2/2017 10:20 AM Bora Hardwick MD [facility]        Today's Diagnoses     Type 2 diabetes mellitus with peripheral vascular disease (H)    -  1    DDD (degenerative disc disease), cervical        Chronic pain syndrome        Hyperlipidemia LDL goal <70        Bilateral claudication of lower limb (H)        Hypertension goal BP (blood pressure) < 140/90        Subclinical hypothyroidism        Fatigue, unspecified type        Coronary artery disease of native heart with stable angina pectoris, unspecified vessel or lesion type (H)          Care Instructions    Let have you cut back on the metoprolol to 50mg twice daily.     I will ask Dr Carr about doing an echo test.     Let have you cut back on the glipizide to one pill daily.         Follow-ups after your visit        Your next 10 appointments already scheduled     May 23, 2017  3:45 PM CDT   Return Visit with Choco Carr MD   [facility] [place] ([address] [id number] [phone]              Who to contact     If you have questions or need follow up information about today's clinic visit or your schedule please contact [facility] directly at [phone].  Normal or non-critical lab and imaging results will be communicated to you by MyChart, letter or phone within 4 business days after the clinic has received the results. If you do not hear from us within 7 days, please contact the clinic through MyChart or phone. If you have a critical or abnormal lab result, we will notify you by phone as soon as possible.  Submit refill requests through Cuculus or call your pharmacy and they will forward the refill request to us.  Please allow 3 business days for your refill to be completed.          Additional Information About Your Visit        MyChart Information     Sub[employer] System[employer]art gives you secure access to your electronic health record. If you see a primary care provider, you can also send messages to your care team and make appointments. If you have questions, please call your primary care clinic.  If you do not have a primary care provider, please call [phone] and they will assist you.        Care EveryWhere ID     This is your Care EveryWhere ID. This could be used by other organizations to access your [facility] medical records  [id number]        Your Vitals Were     Pulse Temperature Respirations Pulse Oximetry BMI (Body Mass Index)       80 97.8  F (36.6  C) (Oral) 16 97% 23.11 kg/m2        Blood Pressure from Last 3 Encounters:   03/02/17 98/56   02/11/17 142/70   02/07/17 110/48    Weight from Last 3 Encounters:   03/02/17 152 lb (68.9 kg)   02/11/17 160 lb (72.6 kg)   02/07/17 160 lb 4.8 oz (72.7 kg)              We Performed the Following     Comprehensive metabolic panel     Hemoglobin A1c     Lipid Profile with reflex to direct LDL     Microalbumin quantitative, random urine     TSH with free T4 reflex          Where to get your medicines      Some of these will need a paper prescription and others can be bought over the counter.  Ask your nurse if you have questions.     Bring a paper prescription for each of these medications     HYDROcodone-acetaminophen 5-325 MG per tablet          Primary Care Provider Office Phone # Fax #    Bora Hardwick [phone]       [address] [address]        Thank you!     Thank you for choosing [facility] your care. Our goal is always to provide you with excellent care. Hearing back from our patients is one way we can continue to improve our services. Please take a few minutes to complete the written survey that you  may receive in the mail after your visit with us. Thank you!             Your Updated Medication List - Protect others around you: Learn how to safely use, store and throw away your medicines at www.disposemymeds.org.          This list is accurate as of: 3/2/17 11:09 AM.  Always use your most recent med list.                   Brand Name Dispense Instructions for use    aspirin 81 MG tablet     100 tablet    Take 1 tablet (81 mg) by mouth daily       blood glucose monitoring lancets     1 Box    Use to test blood sugar 1 times daily or as directed.       blood glucose monitoring meter device kit     1 kit    Use to test blood sugar 1 times daily or as directed.       * blood glucose monitoring test strip    no brand specified    100 each    1 strip by In Vitro route daily Comfort curve test stripes       * blood glucose monitoring test strip    ACCU-CHEK SMILEY PLUS    100 each    Use to test blood sugar 1 times daily or as directed.       clopidogrel 75 MG tablet    PLAVIX    90 tablet    Take 1 tablet (75 mg) by mouth daily       COENZYME Q-10 PO      Take 1 tablet by mouth daily       DULoxetine 20 MG EC capsule    CYMBALTA    90 capsule    Take 1 capsule (20 mg) by mouth daily       evolocumab 140 MG/ML prefilled autoinjector    REPATHA    2 each    Inject 1 mL (140 mg) Subcutaneous every 14 days       glipiZIDE 2.5 MG 24 hr tablet    glipiZIDE XL    180 tablet    Take 1 tablet (2.5 mg) by mouth 2 times daily       HYDROcodone-acetaminophen 5-325 MG per tablet    NORCO    90 tablet    Take 1 tablet by mouth every 6 hours as needed for pain #90 for three months supply.  Do not take with tramadol. (Patient only takes when tramadol does not work)       hydrocortisone 2.5 % cream    ANUSOL-HC    30 g    Place rectally 2 times daily as needed for hemorrhoids Do not apply for more that two weeks at a time       levocetirizine 5 MG tablet    XYZAL    90 tablet    Take 1 tablet (5 mg) by mouth every evening        levothyroxine 25 MCG tablet    SYNTHROID/LEVOTHROID     Take 25 mcg by mouth daily       * lidocaine 5 % ointment    XYLOCAINE    50 g    Apply topically as needed for moderate pain       * lidocaine 5 % Patch    LIDODERM    30 patch    Place 1 patch onto the skin as needed Over painful areas       lisinopril 2.5 MG tablet    PRINIVIL/Zestril    180 tablet    Take 1 tablet (2.5 mg) by mouth every evening       LORazepam 0.5 MG tablet    ATIVAN    60 tablet    Take 1-2 tablets (0.5-1 mg) by mouth nightly as needed Take 1-2 tabs by mouth as needed for anxiety and or sleeping-       metFORMIN 500 MG 24 hr tablet    GLUCOPHAGE-XR    270 tablet    Take 1 tab in the morning with breakfast and 2 tabs with dinner.       * metoprolol 25 MG tablet    LOPRESSOR     Take 25 mg by mouth Take 1 tab in am along with a 50 mg tab to equal 75 mg       * metoprolol 50 MG tablet    LOPRESSOR     Take 50 mg by mouth 2 times daily Take along with a 25 mg tablet in am to equal 75 mg, 1 tab in pm       Multi-vitamin Tabs tablet      Take 1 tablet by mouth daily       * nitroglycerin 0.4 MG sublingual tablet    NITROSTAT    100 tablet    1 TAB EVERY 5 MIN AS NEEDED, UP TO 3 PER EPISODE - Pt may  4 bottles of 25 at a time       * nitroglycerin 0.4 MG/HR 24 hr patch    NITRODUR    30 patch    Place 1 patch onto the skin daily       tiZANidine 2 MG tablet    ZANAFLEX    30 tablet    Take 0.5-1 tablets (1-2 mg) by mouth 2 times daily as needed for muscle spasms       traMADol-acetaminophen 37.5-325 MG per tablet    ULTRACET    120 tablet    Two every morning and two at dinnertime as needed , discussed/knows not to use with vicodin since both had acetaminophen and would be over-sedating.       * Notice:  This list has 8 medication(s) that are the same as other medications prescribed for you. Read the directions carefully, and ask your doctor or other care provider to review them with you.

## 2017-03-09 NOTE — TELEPHONE ENCOUNTER
Controlled Substance Refill Request for ULTRACET 37.5-325 MG  Problem List Complete:  YES       Disp Refills Start End SAMUEL   traMADol-acetaminophen (ULTRACET) 37.5-325 MG per tablet 120 tablet 2 11/15/2016  No   Sig: Two every morning and two at dinnertime as needed , discussed/knows not to use with vicodin since both had acetaminophen and would be over-sedating.     Last Office Visit with [facility] primary care provider: 3-2-17    Future Office visit:   Next 5 appointments (look out 90 days)     May 23, 2017  3:45 PM CDT   Return Visit with Choco Carr MD   [facility] [place] ([facility])    [address] [place] [id number] [phone]                  Controlled substance agreement on file: Yes:  Date 9-22-15.     Processing:  ?   checked in past 6 months?  Yes 11-15-16

## 2017-04-26 NOTE — TELEPHONE ENCOUNTER
"Pt wife came to clinic wanting to know if  has signed \"the letter\" she dropped off approximately 2 weeks ago. I checked with 's nurse Lisa. No such letter was found. Pt's wife said the letter was to confirm that pt cannot afford the cost of Repatha so he could re-apply . Pt was granted funding from the [employer] last year, funding ended in February of 2017. Pt went from 0 copay to $ 600.00 per injection. I explained that I would start a prior authorization for Repatha, in the interim I told her I would supplement pt with samples until he gets through the PA process. She agreed to this plan of care. Homero Gillespie ([employer] representative) notified that we need samples for patient. Samples should be here next week, possibly Wednesday.   "

## 2017-05-03 NOTE — TELEPHONE ENCOUNTER
Pt's wife stopped by clinic and said she was here to  samples of repatha. I had the  let her know that we have not received the repatha samples yet. Will call her once we have received them. Zara REY

## 2017-05-04 NOTE — TELEPHONE ENCOUNTER
Order through [employer]/Repatha rep placed for 4 samples. Should arrive sometime middle of next week.    Called patient, spoke with wife Cortney, and I let her know that the samples will be in around middle of next week. I told her we would call when they arrived.

## 2017-05-04 NOTE — TELEPHONE ENCOUNTER
Called patient, spoke with wife Cortney. I let her know that the samples arrived just now and that she can come pick them up (there are 4 sureclick injections, good for two months). She said she is able to stop by this afternoon. The other 4 samples that I just ordered today can go to another patient who is still waiting on funding.

## 2017-05-13 NOTE — TELEPHONE ENCOUNTER
Medication Detail      Disp Refills Start End SAMUEL   levothyroxine (SYNTHROID/LEVOTHROID) 25 MCG tablet     --   Sig: Take 25 mcg by mouth daily   Class: Historical   Route: Oral       Last Office Visit with [facility] or [facility] prescribing provider: 3/2/2017  Future Office visit:    Next 5 appointments (look out 90 days)     May 23, 2017  3:45 PM CDT   Return Visit with Choco Carr MD   [facility] [place] ([facility])    [address] [place] [address] [address]  [facility] [address]   [phone]            Jun 06, 2017  2:30 PM CDT   Return Visit with Eitan Denton MD   [facility] ([facility])    [address] [place] [address] [address]  [facility] [address] [phone]                   Routing refill request to provider for review/approval because:  Medication is reported/historical

## 2017-05-16 NOTE — TELEPHONE ENCOUNTER
It looks like 75mcg to me. Can you call him or his wife to confirm please what he is actually taking.      Bora Hardwick

## 2017-05-18 NOTE — TELEPHONE ENCOUNTER
Called [facility] PA Team for update on status of Walt VALENCIA. The team member I spoke with did not know where in there process this is at, as the specialty med PA team does not document anything until an approval or denial is received. She is going to send a message to the specialty med PA team requesting status update and also request that they send a message to Team 1 pool with the status. Direct phone number also given in case they need to contact us directly for more information.

## 2017-05-19 NOTE — TELEPHONE ENCOUNTER
Contacted  Specialty pharmacy, spoke with pharmacy Tamarac who states that patient had funding (source ?) to cover the co-pay through 2/15/17. Patient was contacted by pharmacy at that time to re-enroll in the funding program on file, as co-pay otherwise was $567/month. Pharmacy Tamarac states patient never called back or attempted a re-fill of the med.     I spoke with Anayeli RN who handles Repatha at our clinic. She states that the patient's wife was given an [employer] application in April to fill out to apply for funding, but we never received the form back from the patient. She will contact patient and wife regarding the matter.

## 2017-05-19 NOTE — TELEPHONE ENCOUNTER
Will contact pharmacy to see what the expected co-pay is and then discuss with patient whether or not it is affordable. If not, we can get him started with an [employer] Safety Net (Repatha funding) application.        Repatha PA  Received: Today       Didier Munoz Danielle, RN                   Per [employer], there is a prior authorization currently on file. Effective dates: 12/31/16 - 8/13/17. It doesn't look like the pt has filled the medication since receiving the RX.

## 2017-05-19 NOTE — TELEPHONE ENCOUNTER
He is on 25mcg  See my chart msg from 5/19/17  Pt already has refills on 25mcg  Geneva Martines RN

## 2017-05-23 NOTE — MR AVS SNAPSHOT
After Visit Summary   5/23/2017    Bora Metzger    MRN: [id number]           Patient Information     Date Of Birth          1/15/1931        Visit Information        Provider Department      5/23/2017 3:45 PM hCoco Carr MD [facility] [place]        Today's Diagnoses     Coronary artery disease due to lipid rich plaque           Follow-ups after your visit        Additional Services     Follow-Up with Cardiologist                 Your next 10 appointments already scheduled     Jun 01, 2017  1:20 PM CDT   SHORT with Bora Hardwick MD   [facility]    [address] [facility] [address]   [phone]            Jun 06, 2017  1:30 PM CDT   US CAROTID BILATERAL with SHVUS2   [facility] ([facility])    [address]  [facility] [address]   [phone]           Please bring a list of your medicines (including vitamins, minerals and over-the-counter drugs). Also, tell your doctor about any allergies you may have. Wear comfortable clothes and leave your valuables at home.  You do not need to do anything special to prepare for your exam.  Please call the Imaging Department at your exam site with any questions.            Jun 06, 2017  2:30 PM CDT   Return Visit with Eitan Denton MD   [facility] ([facility])    [address]  [facility] [address] [phone]              Future tests that were ordered for you today     Open Future Orders        Priority Expected Expires Ordered    Follow-Up with Cardiologist Routine 5/23/2018 10/5/2018 5/23/2017    Basic metabolic panel Routine 5/23/2018 6/27/2018 5/23/2017    Lipid Profile Routine 5/23/2018 6/27/2018 5/23/2017    ALT Routine 5/23/2018 6/27/2018 5/23/2017            Who to contact     If you  "have questions or need follow up information about today's clinic visit or your schedule please contact [facility] [place] directly at [phone].  Normal or non-critical lab and imaging results will be communicated to you by MyChart, letter or phone within 4 business days after the clinic has received the results. If you do not hear from us within 7 days, please contact the clinic through [employer] or phone. If you have a critical or abnormal lab result, we will notify you by phone as soon as possible.  Submit refill requests through [employer] or call your pharmacy and they will forward the refill request to us. Please allow 3 business days for your refill to be completed.          Additional Information About Your Visit        [employer] Information     [employer] gives you secure access to your electronic health record. If you see a primary care provider, you can also send messages to your care team and make appointments. If you have questions, please call your primary care clinic.  If you do not have a primary care provider, please call [phone] and they will assist you.        Care EveryWhere ID     This is your Care EveryWhere ID. This could be used by other organizations to access your [facility] medical records  [id number]        Your Vitals Were     Pulse Height BMI (Body Mass Index)             72 1.727 m (5' 8\") 24.56 kg/m2          Blood Pressure from Last 3 Encounters:   05/23/17 124/60   03/02/17 98/56   02/11/17 142/70    Weight from Last 3 Encounters:   05/23/17 73.3 kg (161 lb 8 oz)   03/02/17 68.9 kg (152 lb)   02/11/17 72.6 kg (160 lb)              We Performed the Following     Follow-Up with Cardiologist        Primary Care Provider Office Phone # Fax #    Bora Hardwick [phone]       [address] [address]        Thank you!     Thank you for choosing [facility]  for " your care. Our goal is always to provide you with excellent care. Hearing back from our patients is one way we can continue to improve our services. Please take a few minutes to complete the written survey that you may receive in the mail after your visit with us. Thank you!             Your Updated Medication List - Protect others around you: Learn how to safely use, store and throw away your medicines at www.disposemymeds.org.          This list is accurate as of: 5/23/17  4:30 PM.  Always use your most recent med list.                   Brand Name Dispense Instructions for use    aspirin 81 MG tablet     100 tablet    Take 1 tablet (81 mg) by mouth daily       blood glucose monitoring lancets     1 Box    Use to test blood sugar 1 times daily or as directed.       blood glucose monitoring meter device kit     1 kit    Use to test blood sugar 1 times daily or as directed.       * blood glucose monitoring test strip    no brand specified    100 each    1 strip by In Vitro route daily Comfort curve test stripes       * blood glucose monitoring test strip    ACCU-CHEK SMILEY PLUS    100 each    Use to test blood sugar 1 times daily or as directed.       clopidogrel 75 MG tablet    PLAVIX    90 tablet    Take 1 tablet (75 mg) by mouth daily       COENZYME Q-10 PO      Take 1 tablet by mouth daily       DULoxetine 20 MG EC capsule    CYMBALTA    180 capsule    Take 1 capsule (20 mg) by mouth 2 times daily       evolocumab 140 MG/ML prefilled autoinjector    REPATHA    2 each    Inject 1 mL (140 mg) Subcutaneous every 14 days       glipiZIDE 2.5 MG 24 hr tablet    glipiZIDE XL    180 tablet    Take 1 tablet (2.5 mg) by mouth 2 times daily       HYDROcodone-acetaminophen 5-325 MG per tablet    NORCO    90 tablet    Take 1 tablet by mouth every 6 hours as needed for pain #90 for three months supply.  Do not take with tramadol. (Patient only takes when tramadol does not work)       hydrocortisone 2.5 % cream    ANUSOL-HC     30 g    Place rectally 2 times daily as needed for hemorrhoids Do not apply for more that two weeks at a time       IMDUR 30 MG 24 hr tablet   Generic drug:  isosorbide mononitrate      1-2 tabs daily       levocetirizine 5 MG tablet    XYZAL    90 tablet    Take 1 tablet (5 mg) by mouth every evening       levothyroxine 25 MCG tablet    SYNTHROID/LEVOTHROID    90 tablet    Take 1 tablet (25 mcg) by mouth daily       * lidocaine 5 % ointment    XYLOCAINE    50 g    Apply topically as needed for moderate pain       * lidocaine 5 % Patch    LIDODERM    30 patch    Place 1 patch onto the skin as needed Over painful areas       lisinopril 2.5 MG tablet    PRINIVIL/Zestril    180 tablet    Take 1 tablet (2.5 mg) by mouth every evening       LORazepam 0.5 MG tablet    ATIVAN    60 tablet    Take 1-2 tablets (0.5-1 mg) by mouth nightly as needed Take 1-2 tabs by mouth as needed for anxiety and or sleeping-       metFORMIN 500 MG 24 hr tablet    GLUCOPHAGE-XR    270 tablet    Take 1 tab in the morning with breakfast and 2 tabs with dinner.       metoprolol 50 MG tablet    LOPRESSOR    180 tablet    Take 1 tablet (50 mg) by mouth 2 times daily       Multi-vitamin Tabs tablet      Take 1 tablet by mouth daily       * nitroglycerin 0.4 MG sublingual tablet    NITROSTAT    100 tablet    1 TAB EVERY 5 MIN AS NEEDED, UP TO 3 PER EPISODE - Pt may  4 bottles of 25 at a time       * nitroglycerin 0.4 MG/HR 24 hr patch    NITRODUR    30 patch    Place 1 patch onto the skin daily       tiZANidine 2 MG tablet    ZANAFLEX    30 tablet    Take 0.5-1 tablets (1-2 mg) by mouth 2 times daily as needed for muscle spasms       traMADol-acetaminophen 37.5-325 MG per tablet    ULTRACET    120 tablet    Two every morning and two at dinnertime as needed , discussed/knows not to use with vicodin since both had acetaminophen and would be over-sedating.       * Notice:  This list has 6 medication(s) that are the same as other medications  prescribed for you. Read the directions carefully, and ask your doctor or other care provider to review them with you.

## 2017-05-23 NOTE — PROGRESS NOTES
HPI and Plan:   See dictation    Orders Placed This Encounter   Procedures     Basic metabolic panel     Lipid Profile     ALT     Follow-Up with Cardiologist     Orders Placed This Encounter   Medications     isosorbide mononitrate (IMDUR) 30 MG 24 hr tablet     Si-2 tabs daily     Medications Discontinued During This Encounter   Medication Reason     metoprolol (LOPRESSOR) 25 MG tablet Erroneous Entry         Encounter Diagnosis   Name Primary?     Coronary artery disease due to lipid rich plaque        CURRENT MEDICATIONS:  Current Outpatient Prescriptions   Medication Sig Dispense Refill     isosorbide mononitrate (IMDUR) 30 MG 24 hr tablet 1-2 tabs daily       metFORMIN (GLUCOPHAGE-XR) 500 MG 24 hr tablet Take 1 tab in the morning with breakfast and 2 tabs with dinner. 270 tablet 1     metoprolol (LOPRESSOR) 50 MG tablet Take 1 tablet (50 mg) by mouth 2 times daily 180 tablet 1     levothyroxine (SYNTHROID/LEVOTHROID) 25 MCG tablet Take 1 tablet (25 mcg) by mouth daily 90 tablet 2     traMADol-acetaminophen (ULTRACET) 37.5-325 MG per tablet Two every morning and two at dinnertime as needed , discussed/knows not to use with vicodin since both had acetaminophen and would be over-sedating. 120 tablet 2     HYDROcodone-acetaminophen (NORCO) 5-325 MG per tablet Take 1 tablet by mouth every 6 hours as needed for pain #90 for three months supply.  Do not take with tramadol. (Patient only takes when tramadol does not work) 90 tablet 0     DULoxetine (CYMBALTA) 20 MG EC capsule Take 1 capsule (20 mg) by mouth 2 times daily 180 capsule 1     evolocumab (REPATHA) 140 MG/ML prefilled autoinjector Inject 1 mL (140 mg) Subcutaneous every 14 days 2 each 12     nitroglycerin (NITRODUR) 0.4 MG/HR 24 hr patch Place 1 patch onto the skin daily 30 patch 12     lidocaine (LIDODERM) 5 % patch Place 1 patch onto the skin as needed Over painful areas 30 patch 5     lisinopril (PRINIVIL,ZESTRIL) 2.5 MG tablet Take 1 tablet (2.5 mg)  by mouth every evening 180 tablet 2     nitroglycerin (NITROSTAT) 0.4 MG SL tablet 1 TAB EVERY 5 MIN AS NEEDED, UP TO 3 PER EPISODE - Pt may  4 bottles of 25 at a time 100 tablet 3     glipiZIDE (GLIPIZIDE XL) 2.5 MG 24 hr tablet Take 1 tablet (2.5 mg) by mouth 2 times daily 180 tablet 2     LORazepam (ATIVAN) 0.5 MG tablet Take 1-2 tablets (0.5-1 mg) by mouth nightly as needed Take 1-2 tabs by mouth as needed for anxiety and or sleeping- 60 tablet 5     clopidogrel (PLAVIX) 75 MG tablet Take 1 tablet (75 mg) by mouth daily 90 tablet 3     lidocaine (XYLOCAINE) 5 % ointment Apply topically as needed for moderate pain 50 g 3     tiZANidine (ZANAFLEX) 2 MG tablet Take 0.5-1 tablets (1-2 mg) by mouth 2 times daily as needed for muscle spasms 30 tablet 1     blood glucose monitoring (ACCU-CHEK SMILEY PLUS) test strip Use to test blood sugar 1 times daily or as directed. 100 each 11     blood glucose monitoring (SOFTCLIX) lancets Use to test blood sugar 1 times daily or as directed. 1 Box 11     levocetirizine (XYZAL) 5 MG tablet Take 1 tablet (5 mg) by mouth every evening 90 tablet 2     hydrocortisone (ANUSOL-HC) 2.5 % rectal cream Place rectally 2 times daily as needed for hemorrhoids Do not apply for more that two weeks at a time 30 g 2     COENZYME Q-10 PO Take 1 tablet by mouth daily       aspirin 81 MG tablet Take 1 tablet (81 mg) by mouth daily 100 tablet 1     blood glucose monitoring (ACCU-CHEK SMILEY PLUS) meter device kit Use to test blood sugar 1 times daily or as directed. 1 kit 0     glucose blood VI test strips strip 1 strip by In Vitro route daily Comfort curve test stripes 100 each 3     multivitamin, therapeutic with minerals (MULTI-VITAMIN) TABS Take 1 tablet by mouth daily       [DISCONTINUED] metoprolol (LOPRESSOR) 25 MG tablet Take 25 mg by mouth Take 1 tab in am along with a 50 mg tab to equal 75 mg         ALLERGIES     Allergies   Allergen Reactions     Insulin Anaphylaxis     Had this in  "hospital.  Unsure of which insulin.   \"went crazy\"    Caused shock.     Amlodipine Besylate      Heart pounds uncontrollably      Contrast Dye      Developed itchy rash .     Crestor [Rosuvastatin Calcium]      Severe muscle pains     Gabapentin Diarrhea     Hmg-Coa-R Inhibitors      Levalo-- reaction, ended up in the hospital     Livalo [Pitavastatin]      After 7 days --severe muscle pains, joint pains.     Naprosyn [Naproxen]      Stomach disorder     Penicillins      rash     Welchol [Colesevelam] GI Disturbance     Ineffective on cholesterol     Zetia [Ezetimibe] Cramps     Ineffective on cholesterol       PAST MEDICAL HISTORY:  Past Medical History:   Diagnosis Date     Acute myocardial infarction, unspecified site, episode of care unspecified 1995     Anxiety      Arrhythmia     PAC, PVC     Arthritis      Atrial fibrillation (H)      Bilateral claudication of lower limb (H)      Bladder cancer (H) 2013     Carotid artery disease (H)     right carotid endarterectomy 2009     Chronic pain syndrome      Coronary artery disease     CVS rec lifelong plavix due to diffuse CAD     ED (erectile dysfunction)     since surgery and XRT     Epididymitis      Essential hypertension, benign      Herpes zoster without mention of complication 2007     History of radiation therapy     prostate, 2001 (37 treatments)     Hypothyroidism      Malignant neoplasm of prostate (H)      NSTEMI (non-ST elevated myocardial infarction) (H) 9/2015 9/2015 Heart cath - severe native vessel CAD, patent LIMA to LAD and SVG to RCA, occluded SVG to 1st diagonal (likely chronic)     Osteoporosis      Other and unspecified hyperlipidemia     statin intolerance     Postsurgical aortocoronary bypass status 2004    Nation-Lad, VG-RI, VG-RCA (cath 2011 grafts open, only potential ischemia prox Lad before Lima)     Stented coronary artery     prior to CAB: Lad, RI, brachyRx     Type II or unspecified type diabetes mellitus without mention of " complication, not stated as uncontrolled      Unspecified hearing loss        PAST SURGICAL HISTORY:  Past Surgical History:   Procedure Laterality Date     APPENDECTOMY  1970     bcg treatment  2011-12    12 total     C CABG, ARTERIAL, THREE  2004    triple lad, ri, rca     CAROTID ENDARTERECTOMY  2009    right side     CHOLECYSTECTOMY, OPEN  1987     COLONOSCOPY N/A 2/27/2015    Procedure: COMBINED COLONOSCOPY, SINGLE OR MULTIPLE BIOPSY/POLYPECTOMY BY BIOPSY;  Surgeon: Kenroy Licona MD;  Location:  GI     COLONOSCOPY N/A 10/28/2016    Procedure: COMBINED COLONOSCOPY, SINGLE OR MULTIPLE BIOPSY/POLYPECTOMY BY BIOPSY;  Surgeon: Kenroy Licona MD;  Location:  GI     CORONARY ANGIOGRAPHY ADULT ORDER  11/21/2011    all grafts open (only poss area of ischemia is Lad origin closed and Liima to Lad ok but small prox section filled via diag graft retro)     CORONARY ANGIOGRAPHY ADULT ORDER  1/28/2004     CORONARY ANGIOGRAPHY ADULT ORDER  9/2015    severe native vessel CAD, patent LIMA to LAD and SVG to RCA, occluded SVG to 1st diagonal (likely chronic     CORONARY ARTERY BYPASS  1/2004     CABG, Nation-Lad, Vg-RI, VG-RCA (due to diffuse dz, rec life long plavix)     CORONARY ARTERY BYPASS  2004    LIMA=LAD, SVG=Ramus, SVG=Right     CYSTOSCOPY, BIOPSY BLADDER, COMBINED  3/28/2013    Procedure: COMBINED CYSTOSCOPY, BIOPSY BLADDER;  CYSTOSCOPY, BLADDER BIOPSY, FULGURATION ;  Surgeon: Rafat Pride MD;  Location:  OR     CYSTOSCOPY, FULGURATE BLADDER TUMOR, COMBINED  3/28/2013    Procedure: COMBINED CYSTOSCOPY, FULGURATE BLADDER TUMOR;;  Surgeon: Rafat Pride MD;  Location:  OR     ENDARTERECTOMY CAROTID   R CEA 2009     eyelid surgery  2009     HC CORONARY STENT ENEDINA CAMACHO ADDTL VESSEL      6 total, last 2003     HC REMOVAL ANAL FISTULA,SUBCUTANEOUS       HC REPAIR OF NASAL SEPTUM  1994     HEART CATH, ANGIOPLASTY  6/2003     BrachyTx RI (?new stent also)     HEART CATH, ANGIOPLASTY  5/2003     Stent RI, GEORGE  stent distal LAD (60%RCA)     HEART CATH, ANGIOPLASTY  2003    PTCA-RI     HEART CATH, ANGIOPLASTY      LAD Stent     HERNIA REPAIR, INGUINAL RT/LT      and      SURGICAL HISTORY OF -       wrist surgery     SURGICAL HISTORY OF -   3/2/2011    Transurethral resection of bladder tumors     TONSILLECTOMY       TURP         FAMILY HISTORY:  Family History   Problem Relation Age of Onset     Heart Failure Mother      Coronary Artery Disease Mother       age 101 congestive heart attack     Hypertension Mother      Hyperlipidemia Mother      Thyroid Disease Mother      Myocardial Infarction Father      Coronary Artery Disease Father       age 83 heart attack     Hypertension Father      Hyperlipidemia Father      Prostate Cancer Father      Anesthesia Reaction Father      DIABETES Brother       age 51 drugs/alcohol     CEREBROVASCULAR DISEASE No family hx of        SOCIAL HISTORY:  Social History     Social History     Marital status:      Spouse name: maicol      Number of children: N/A     Years of education: 18     Occupational History            retired     Social History Main Topics     Smoking status: Never Smoker     Smokeless tobacco: Never Used     Alcohol use No     Drug use: No     Sexual activity: No     Other Topics Concern     Caffeine Concern Yes     occasional coffee or a diet coke     Sleep Concern No     Stress Concern Yes     related to health concerns     Weight Concern No     Special Diet No     Exercise Yes     not much     Seat Belt Yes     Parent/Sibling W/ Cabg, Mi Or Angioplasty Before 65f 55m? No     father & mother now      Social History Narrative    Patient live independently with his spouse. He is a retired .       Review of Systems:  Skin:  Positive for bruising   Eyes:  Negative    ENT:  Positive for hearing loss  Respiratory:  Negative    Cardiovascular:    Positive for;chest pain;dizziness;edema;heaviness;lower  "extremity symptoms;palpitations;lightheadedness;fatigue  Gastroenterology: Positive for poor appetite;constipation  Genitourinary:  Positive for prostate problem;nocturia  Musculoskeletal:  Positive for arthritis  Neurologic:  Positive for headaches;numbness or tingling of feet  Psychiatric:  Positive for excessive stress;depression;sleep disturbances  Heme/Lymph/Imm:  Positive for easy bruising;allergies  Endocrine:  Positive for thyroid disorder;diabetes    Physical Exam:  Vitals: /60  Pulse 72  Ht 1.727 m (5' 8\")  Wt 73.3 kg (161 lb 8 oz)  BMI 24.56 kg/m2    Constitutional:  cooperative, alert and oriented, well developed, well nourished, in no acute distress        Skin:  warm and dry to the touch, no apparent skin lesions or masses noted        Head:  normocephalic, no masses or lesions        Eyes:  pupils equal and round, conjunctivae and lids unremarkable, sclera white, no xanthalasma, EOMS intact, no nystagmus        ENT:  no pallor or cyanosis, dentition good        Neck:  carotid pulses are full and equal bilaterally, JVP normal, no carotid bruit, no thyromegaly   RCEA well healed    Chest:  normal breath sounds, clear to auscultation, normal A-P diameter, normal symmetry, normal respiratory excursion, no use of accessory muscles     surgical scar    Cardiac: regular rhythm;normal S1 and S2       grade 1;systolic ejection murmur          Abdomen:  abdomen soft;BS normoactive        Vascular: pulses full and equal, no bruits auscultated                                        Extremities and Back:  no deformities, clubbing, cyanosis, erythema observed              Neurological:  affect appropriate, oriented to time, person and place          Recent Lab Results:  LIPID RESULTS:  Lab Results   Component Value Date    CHOL 158 03/02/2017    HDL 52 03/02/2017    LDL 63 03/02/2017    TRIG 217 (H) 03/02/2017    CHOLHDLRATIO 4.7 09/22/2015       LIVER ENZYME RESULTS:  Lab Results   Component Value Date "    AST 14 03/02/2017    ALT 20 03/02/2017       CBC RESULTS:  Lab Results   Component Value Date    WBC 16.1 (H) 02/11/2017    RBC 4.27 (L) 02/11/2017    HGB 12.1 (L) 02/11/2017    HCT 38.3 (L) 02/11/2017    MCV 90 02/11/2017    MCH 28.3 02/11/2017    MCHC 31.6 02/11/2017    RDW 13.8 02/11/2017     02/11/2017       BMP RESULTS:  Lab Results   Component Value Date     03/02/2017    POTASSIUM 4.3 03/02/2017    CHLORIDE 102 03/02/2017    CO2 29 03/02/2017    ANIONGAP 6 03/02/2017     (H) 03/02/2017    BUN 23 03/02/2017    CR 1.36 (H) 03/02/2017    GFRESTIMATED 50 (L) 03/02/2017    GFRESTBLACK 60 (L) 03/02/2017    YUAN 9.3 03/02/2017        A1C RESULTS:  Lab Results   Component Value Date    A1C 7.0 (H) 03/02/2017       INR RESULTS:  Lab Results   Component Value Date    INR 0.99 02/27/2016    INR 1.01 09/11/2015           CC  LEENA Avila CNP  [facility]  [address]     [place] [id number]

## 2017-05-23 NOTE — LETTER
5/23/2017    Bora Hardwick MD  [address]  [facility] [address]      RE: Bora Metzger       Dear Colleague,    I had the pleasure of seeing Bora Metzger in the [facility].    I had the pleasure of following up with our mutual patient, Bora Metzger, accompanied by his wife.  He has some panvascular disease.  Cardiac-wise, he had a remote history of bypass surgery in 2004 and has intermittent stenting.  At his last heart catheterization, the LIMA graft to LAD was patent, but the LAD was diffusely diseased distal to the graft and proximal to the graft.  The vein graft to the right coronary artery was open, but there was narrowing in the posterolateral branch of the distal RCA.  Vein graft to the ramus was occluded.  The left circumflex had moderately severe narrowing in its distal portion and into an OM branch.  We felt that medical therapy was best.  Stenting was possible for some of the areas but not all of them.        The patient has done well with medications.  He is on Repatha for cholesterol management.  His cholesterol numbers are excellent and they can be found in Epic.  His blood pressure numbers look good.  He has intermittent body aches, but it does not sound like angina.  He is scheduled to see Dr. Denton in the upcoming month for followup of his carotid disease.  He had a previous right carotid endarterectomy and moderate disease in the left; that will be followed.        I have not made any changes to his current medications.  I will see him back in a year with blood work.  I do not think he needs an echo or stress test for at least a couple more years.      This was a 25-minute visit; greater than 50% counseling.     Again, thank you for allowing me to participate in the care of your patient.      Sincerely,    Choco Carr MD     [facility]    cc:      Homero Denton MD    [facility]   [address]    [place] [address]

## 2017-05-24 NOTE — PROGRESS NOTES
HISTORY OF PRESENT ILLNESS:  I had the pleasure of following up with our mutual patient, Waqar Slaughter, accompanied by his wife.  He has some panvascular disease.  Cardiac-wise, he had a remote history of bypass surgery in  and has intermittent stenting.  At his last heart catheterization, the LIMA graft to LAD was patent, but the LAD was diffusely diseased distal to the graft and proximal to the graft.  The vein graft to the right coronary artery was open, but there was narrowing in the posterolateral branch of the distal RCA.  Vein graft to the ramus was occluded.  The left circumflex had moderately severe narrowing in its distal portion and into an OM branch.  We felt that medical therapy was best.  Stenting was possible for some of the areas but not all of them.        The patient has done well with medications.  He is on Repatha for cholesterol management.  His cholesterol numbers are excellent and they can be found in Epic.  His blood pressure numbers look good.  He has intermittent body aches, but it does not sound like angina.  He is scheduled to see Dr. Denton in the upcoming month for followup of his carotid disease.  He had a previous right carotid endarterectomy and moderate disease in the left; that will be followed.        I have not made any changes to his current medications.  I will see him back in a year with blood work.  I do not think he needs an echo or stress test for at least a couple more years.      This was a 25-minute visit; greater than 50% counseling.      cc:      Waqar Hardwick MD    [facility]   [address] [place] [address]       Homero Denton MD    [facility]   [address]   [place] [address]         QIANA CHAUHAN MD             D: 2017 16:30   T: 2017 15:46   MT: KEISHA      Name:     WAQAR SLAUGHTER   MRN:      [id number]        Account:      [id number]   :      01/15/1931           Service Date: 2017       Document: [id number]

## 2017-06-01 NOTE — PATIENT INSTRUCTIONS
No change in pain medications. Follow up in 3 months.     Take the duloxetine twice daily every day. This medication is only effective if you take it regularly.     Increase the metformin to 2 pills in the am and 2 in the pm.     Stop the glipizide.     Recheck the diabetes in 3 months.

## 2017-06-01 NOTE — MR AVS SNAPSHOT
After Visit Summary   6/1/2017    Bora Metzger    MRN: [id number]           Patient Information     Date Of Birth          1/15/1931        Visit Information        Provider Department      6/1/2017 1:20 PM Bora Hardwick MD [facility]        Today's Diagnoses     Type 2 diabetes mellitus without complication, without long-term current use of insulin (H)        DDD (degenerative disc disease), cervical        Chronic pain syndrome        Anxiety        Pain in joint, multiple sites          Care Instructions    No change in pain medications. Follow up in 3 months.     Take the duloxetine twice daily every day. This medication is only effective if you take it regularly.     Increase the metformin to 2 pills in the am and 2 in the pm.     Stop the glipizide.     Recheck the diabetes in 3 months.           Follow-ups after your visit        Your next 10 appointments already scheduled     Jun 06, 2017  1:30 PM CDT   US CAROTID BILATERAL with SHVUS2   [facility] ([facility])    [address]  [place] [address]   [phone]           Please bring a list of your medicines (including vitamins, minerals and over-the-counter drugs). Also, tell your doctor about any allergies you may have. Wear comfortable clothes and leave your valuables at home.  You do not need to do anything special to prepare for your exam.  Please call the Imaging Department at your exam site with any questions.            Jun 06, 2017  2:30 PM CDT   Return Visit with Eitan Denton MD   [facility] ([facility])    [address]  [place] [address] [phone]              Who to contact     If you have questions or need follow up information about today's clinic visit or your schedule please contact [facility] directly at  "[phone].  Normal or non-critical lab and imaging results will be communicated to you by [employer], letter or phone within 4 business days after the clinic has received the results. If you do not hear from us within 7 days, please contact the clinic through [employer] or phone. If you have a critical or abnormal lab result, we will notify you by phone as soon as possible.  Submit refill requests through [employer] or call your pharmacy and they will forward the refill request to us. Please allow 3 business days for your refill to be completed.          Additional Information About Your Visit        [employer] Information     [employer] gives you secure access to your electronic health record. If you see a primary care provider, you can also send messages to your care team and make appointments. If you have questions, please call your primary care clinic.  If you do not have a primary care provider, please call [phone] and they will assist you.        Care EveryWhere ID     This is your Care EveryWhere ID. This could be used by other organizations to access your [facility] medical records  [id number]        Your Vitals Were     Pulse Temperature Height Pulse Oximetry BMI (Body Mass Index)       88 97.2  F (36.2  C) (Oral) 5' 8\" (1.727 m) 97% 24.18 kg/m2        Blood Pressure from Last 3 Encounters:   06/01/17 122/66   05/23/17 124/60   03/02/17 98/56    Weight from Last 3 Encounters:   06/01/17 159 lb (72.1 kg)   05/23/17 161 lb 8 oz (73.3 kg)   03/02/17 152 lb (68.9 kg)              Today, you had the following     No orders found for display         Today's Medication Changes          These changes are accurate as of: 6/1/17  1:55 PM.  If you have any questions, ask your nurse or doctor.               These medicines have changed or have updated prescriptions.        Dose/Directions    metFORMIN 500 MG 24 hr tablet   Commonly known as:  GLUCOPHAGE-XR   This may have changed:    - how much to take  - how to take this  - " when to take this   Used for:  Type 2 diabetes mellitus without complication, without long-term current use of insulin (H)   Changed by:  Bora Hardwick MD        Dose:  1000 mg   Take 2 tablets (1,000 mg) by mouth 2 times daily (with meals) Take 1 tab in the morning with breakfast and 2 tabs with dinner.   Quantity:  360 tablet   Refills:  1         Stop taking these medicines if you haven't already. Please contact your care team if you have questions.     glipiZIDE 2.5 MG 24 hr tablet   Commonly known as:  glipiZIDE XL   Stopped by:  Bora Hardwick MD                Where to get your medicines      These medications were sent to [facility] [address] [place] [address]  [address] [facility] [address]     Phone:  [phone]     metFORMIN 500 MG 24 hr tablet    tiZANidine 2 MG tablet         Some of these will need a paper prescription and others can be bought over the counter.  Ask your nurse if you have questions.     Bring a paper prescription for each of these medications     HYDROcodone-acetaminophen 5-325 MG per tablet    LORazepam 0.5 MG tablet    traMADol-acetaminophen 37.5-325 MG per tablet                Primary Care Provider Office Phone # Fax #    Bora Hardwick [phone]       [address] [address]        Thank you!     Thank you for choosing [facility]  for your care. Our goal is always to provide you with excellent care. Hearing back from our patients is one way we can continue to improve our services. Please take a few minutes to complete the written survey that you may receive in the mail after your visit with us. Thank you!             Your Updated Medication List - Protect others around you: Learn how to safely use, store and throw away your medicines at www.disposemymeds.org.          This list is accurate as of: 6/1/17  1:55 PM.  Always use your most recent med list.                    Brand Name Dispense Instructions for use    aspirin 81 MG tablet     100 tablet    Take 1 tablet (81 mg) by mouth daily       blood glucose monitoring lancets     1 Box    Use to test blood sugar 1 times daily or as directed.       blood glucose monitoring meter device kit     1 kit    Use to test blood sugar 1 times daily or as directed.       * blood glucose monitoring test strip    no brand specified    100 each    1 strip by In Vitro route daily Comfort curve test stripes       * blood glucose monitoring test strip    ACCU-CHEK SMILEY PLUS    100 each    Use to test blood sugar 1 times daily or as directed.       clopidogrel 75 MG tablet    PLAVIX    90 tablet    Take 1 tablet (75 mg) by mouth daily       COENZYME Q-10 PO      Take 1 tablet by mouth daily       DULoxetine 20 MG EC capsule    CYMBALTA    180 capsule    Take 1 capsule (20 mg) by mouth 2 times daily       evolocumab 140 MG/ML prefilled autoinjector    REPATHA    2 each    Inject 1 mL (140 mg) Subcutaneous every 14 days       HYDROcodone-acetaminophen 5-325 MG per tablet    NORCO    90 tablet    Take 1 tablet by mouth every 6 hours as needed for pain #90 for three months supply.  Do not take with tramadol. (Patient only takes when tramadol does not work)       hydrocortisone 2.5 % cream    ANUSOL-HC    30 g    Place rectally 2 times daily as needed for hemorrhoids Do not apply for more that two weeks at a time       IMDUR 30 MG 24 hr tablet   Generic drug:  isosorbide mononitrate      1-2 tabs daily       levocetirizine 5 MG tablet    XYZAL    90 tablet    Take 1 tablet (5 mg) by mouth every evening       levothyroxine 25 MCG tablet    SYNTHROID/LEVOTHROID    90 tablet    Take 1 tablet (25 mcg) by mouth daily       * lidocaine 5 % ointment    XYLOCAINE    50 g    Apply topically as needed for moderate pain       * lidocaine 5 % Patch    LIDODERM    30 patch    Place 1 patch onto the skin as needed Over painful areas       lisinopril 2.5  MG tablet    PRINIVIL/Zestril    180 tablet    Take 1 tablet (2.5 mg) by mouth every evening       LORazepam 0.5 MG tablet    ATIVAN    60 tablet    Take 1-2 tablets (0.5-1 mg) by mouth nightly as needed Take 1-2 tabs by mouth as needed for anxiety and or sleeping-       metFORMIN 500 MG 24 hr tablet    GLUCOPHAGE-XR    360 tablet    Take 2 tablets (1,000 mg) by mouth 2 times daily (with meals) Take 1 tab in the morning with breakfast and 2 tabs with dinner.       metoprolol 50 MG tablet    LOPRESSOR    180 tablet    Take 1 tablet (50 mg) by mouth 2 times daily       Multi-vitamin Tabs tablet      Take 1 tablet by mouth daily       * nitroglycerin 0.4 MG sublingual tablet    NITROSTAT    100 tablet    1 TAB EVERY 5 MIN AS NEEDED, UP TO 3 PER EPISODE - Pt may  4 bottles of 25 at a time       * nitroglycerin 0.4 MG/HR 24 hr patch    NITRODUR    30 patch    Place 1 patch onto the skin daily       tiZANidine 2 MG tablet    ZANAFLEX    30 tablet    Take 0.5-1 tablets (1-2 mg) by mouth 2 times daily as needed for muscle spasms       traMADol-acetaminophen 37.5-325 MG per tablet    ULTRACET    120 tablet    Two every morning and two at dinnertime as needed , discussed/knows not to use with vicodin since both had acetaminophen and would be over-sedating.       * Notice:  This list has 6 medication(s) that are the same as other medications prescribed for you. Read the directions carefully, and ask your doctor or other care provider to review them with you.

## 2017-06-01 NOTE — NURSING NOTE
"Chief Complaint   Patient presents with     Medication Refill       Initial /66  Pulse 88  Temp 97.2  F (36.2  C) (Oral)  Ht 5' 8\" (1.727 m)  Wt 159 lb (72.1 kg)  SpO2 97%  BMI 24.18 kg/m2 Estimated body mass index is 24.18 kg/(m^2) as calculated from the following:    Height as of this encounter: 5' 8\" (1.727 m).    Weight as of this encounter: 159 lb (72.1 kg).  Medication Reconciliation: complete       Yoel Gavin MA       "

## 2017-06-01 NOTE — PROGRESS NOTES
"  SUBJECTIVE:                                                    Bora Metzger is a 86 year old male who presents to clinic today for the following health issues:      Medication Followup of hydrocodone,     Taking Medication as prescribed: yes    Side Effects:  None    Medication Helping Symptoms:  yes     Pain. His pain has been owrse in his neck as of late. His pelvic pain seems ot be less of an issue as of late. He is not taking cymbalta regularly but only as needed. Taking ultram reguarly but vicodin less often. 90 over 3 months.     Anxiety-he continues to be anxious. Takes banzo infrequently. Knows not to takethe benzo and vicodin together.     Diabetes-taking meds regularly. bg has been good. Reports rare low.     No cv, neuro symptoms except for neuropathy in his feet which has been chronic and not not much change.     OBJECTIVE: /66  Pulse 88  Temp 97.2  F (36.2  C) (Oral)  Ht 5' 8\" (1.727 m)  Wt 159 lb (72.1 kg)  SpO2 97%  BMI 24.18 kg/m2 no apparent distress   Exam:  GENERAL APPEARANCE: healthy, alert and no distress  EYES: Eyes grossly normal to inspection  HENT: ear canals and TM's normal and nose and mouth without ulcers or lesions  NECK: no adenopathy, no asymmetry, masses, or scars and thyroid normal to palpation  RESP: lungs clear to auscultation - no rales, rhonchi or wheezes  CV: regular rates and rhythm, normal S1 S2, no S3 or S4 and no murmur, click or rub -  ABDOMEN:  soft, nontender, no HSM or masses and bowel sounds normal  SKIN: no suspicious lesions or rashes  PSYCH: anxious  Foot exam completed: normal DP and PT pulses, no trophic changes or ulcerative lesions and normal sensory exam    Results for orders placed or performed in visit on 03/02/17   Lipid Profile with reflex to direct LDL   Result Value Ref Range    Cholesterol 158 <200 mg/dL    Triglycerides 217 (H) <150 mg/dL    HDL Cholesterol 52 >39 mg/dL    LDL Cholesterol Calculated 63 <100 mg/dL    Non HDL Cholesterol " 106 <130 mg/dL   Hemoglobin A1c   Result Value Ref Range    Hemoglobin A1C 7.0 (H) 4.3 - 6.0 %   Comprehensive metabolic panel   Result Value Ref Range    Sodium 137 133 - 144 mmol/L    Potassium 4.3 3.4 - 5.3 mmol/L    Chloride 102 94 - 109 mmol/L    Carbon Dioxide 29 20 - 32 mmol/L    Anion Gap 6 3 - 14 mmol/L    Glucose 102 (H) 70 - 99 mg/dL    Urea Nitrogen 23 7 - 30 mg/dL    Creatinine 1.36 (H) 0.66 - 1.25 mg/dL    GFR Estimate 50 (L) >60 mL/min/1.7m2    GFR Estimate If Black 60 (L) >60 mL/min/1.7m2    Calcium 9.3 8.5 - 10.1 mg/dL    Bilirubin Total 0.3 0.2 - 1.3 mg/dL    Albumin 3.7 3.4 - 5.0 g/dL    Protein Total 6.8 6.8 - 8.8 g/dL    Alkaline Phosphatase 92 40 - 150 U/L    ALT 20 0 - 70 U/L    AST 14 0 - 45 U/L   TSH with free T4 reflex   Result Value Ref Range    TSH 1.59 0.40 - 4.00 mU/L   Microalbumin quantitative, random urine   Result Value Ref Range    Creatinine Urine 79 mg/dL    Albumin Urine mg/L 6 mg/L    Albumin Urine mg/g Cr 8.07 0 - 17 mg/g Cr           ICD-10-CM    1. Type 2 diabetes mellitus with peripheral vascular disease (H) E11.51    2. Chronic pain syndrome G89.4 HYDROcodone-acetaminophen (NORCO) 5-325 MG per tablet     LORazepam (ATIVAN) 0.5 MG tablet     tiZANidine (ZANAFLEX) 2 MG tablet   3. DDD (degenerative disc disease), cervical M50.30 HYDROcodone-acetaminophen (NORCO) 5-325 MG per tablet     traMADol-acetaminophen (ULTRACET) 37.5-325 MG per tablet   4. Anxiety F41.9 LORazepam (ATIVAN) 0.5 MG tablet   5. Diabetic polyneuropathy associated with type 2 diabetes mellitus (H) E11.42    6. Pain in joint, multiple sites M25.50 tiZANidine (ZANAFLEX) 2 MG tablet      Patient Instructions   No change in pain medications. Follow up in 3 months.     Take the duloxetine twice daily every day. This medication is only effective if you take it regularly.     Increase the metformin to 2 pills in the am and 2 in the pm.     Stop the glipizide.     Recheck the diabetes in 3 months.

## 2017-06-01 NOTE — TELEPHONE ENCOUNTER
NEED CLARIFICATION ON SIBID OR 1 AM CB + 2 PM CARO.    OUR ORIGINAL SIG:  metFORMIN (GLUCOPHAGE-XR) 500 MG 24 hr tablet 360 tablet 1 2017  No   Sig: Take 2 tablets (1,000 mg) by mouth 2 times daily (with meals) Take 1 tab in the morning with breakfast and 2 tabs with dinner.

## 2017-06-06 NOTE — PROGRESS NOTES
Bora Metzger is a pleasant 86-year-old gentleman with significant cardiac comorbidities and history of bladder and prostate carcinoma.  He is status post right carotid endarterectomy performed by me in 2009.  He presents today for 2 year carotid ultrasound follow-up.  He denies stroke, symptoms of TIA or amaurosis.    Exam:  Well-developed male in no acute distress.  Blood pressure 108/51 with a pulse of 66.  Well-healed right neck incision.  Neurologic exam entirely nonfocal.    Imaging:    CAROTID BILATERAL 6/6/2017 1:46 PM     HISTORY: 86-year-old male status post right carotid endarterectomy on  3/12/2009.     COMPARISON: Carotid ultrasound dated 6/9/2015     FINDINGS:      Right side:   On the grayscale images, scattered plaque is identified in the distal  common carotid artery extending into the internal carotid artery.  Spectral waveform analysis was performed. Peak systolic and diastolic  velocities in the right internal carotid artery are 104 and 31 cm/s  versus 95 and 26 cm/s on the prior exam. Per sonographic criteria,  degree of stenosis in the right internal carotid artery is 16-49%.  Flow in the right vertebral artery is antegrade.      Left side:   On the grayscale images, shadowing plaque is identified at the carotid  bifurcation extending into the internal and external carotid arteries.  Shadowing from the plaque obscures portions of the internal carotid  artery.  Spectral waveform analysis was performed. Peak systolic and diastolic   velocities in the left internal carotid artery are 245 and 65 cm/s  versus 173 and 36 cm/s on the prior exam. Per sonographic criteria,  degree of stenosis in the left internal carotid artery is 50-79%.  Flow in the left vertebral artery is antegrade.          IMPRESSION: Per sonographic criteria, there is a 16-49% stenosis in  the right internal carotid artery and a 50-79% stenosis in the left  internal carotid artery.     Degree of stenosis measured relative to  the diameter of the normal  internal carotid artery per NASCET or NASCET type criteria     SRUTHI FORDE MD        ASSESSMENT:  1.  6 years status post right carotid endarterectomy with stable less than 50% right carotid stenosis.   2.  50-79% asymptomatic left carotid stenosis.  Some progression of disease over the past 2 years but not yet in need of intervention.  3.  Complaints of decreased urinary stream with history of bladder and prostate carcinoma.      PLAN:  He will continue his medical regimen which includes aspirin and Plavix.  I will see him back again in 1 year for repeat left carotid ultrasonography.  The right carotid ultrasound is stable and does not need to be followed annually.  Near the completion of our encounter he reluctantly admits to decreased urinary stream with the above noted  history.  He is followed by Dr. Wilmar Pride who has previously performed bladder fulguration.  I strongly suggested to Hunter and his wife that they contact Dr. Pride this afternoon to schedule an expedited urologic follow-up.  They verbalized understanding and indicate they will schedule an appointment with Dr. Pride in an expedited manner.    Total face-to-face time was 15 minutes, rated than 50% spent providing counseling and education.    Homero Denton M.D.

## 2017-06-06 NOTE — LETTER
[facility] [address]  [place] [address]  Phone: [phone]  Fax: [phone]      2017    RE:  Bora Metzger-:  [birth year]    Bora Metzger is a pleasant 86-year-old gentleman with significant cardiac comorbidities and history of bladder and prostate carcinoma.  He is status post right carotid endarterectomy performed by me in .  He presents today for 2 year carotid ultrasound follow-up.  He denies stroke, symptoms of TIA or amaurosis.     Exam:  Well-developed male in no acute distress.  Blood pressure 108/51 with a pulse of 66.  Well-healed right neck incision.  Neurologic exam entirely nonfocal.     Imaging:     CAROTID BILATERAL 2017 1:46 PM      HISTORY: 86-year-old male status post right carotid endarterectomy on  3/12/2009.      COMPARISON: Carotid ultrasound dated 2015      FINDINGS:       Right side:   On the grayscale images, scattered plaque is identified in the distal  common carotid artery extending into the internal carotid artery.  Spectral waveform analysis was performed. Peak systolic and diastolic  velocities in the right internal carotid artery are 104 and 31 cm/s  versus 95 and 26 cm/s on the prior exam. Per sonographic criteria,  degree of stenosis in the right internal carotid artery is 16-49%.  Flow in the right vertebral artery is antegrade.       Left side:   On the grayscale images, shadowing plaque is identified at the carotid  bifurcation extending into the internal and external carotid arteries.  Shadowing from the plaque obscures portions of the internal carotid  artery.  Spectral waveform analysis was performed. Peak systolic and diastolic   velocities in the left internal carotid artery are 245 and 65 cm/s  versus 173 and 36 cm/s on the prior exam. Per sonographic criteria,  degree of stenosis in the left internal carotid artery is 50-79%.  Flow in the left vertebral artery is antegrade.            IMPRESSION: Per sonographic criteria, there is a 16-49% stenosis in  the right internal carotid artery and a 50-79% stenosis in the left  internal carotid artery.      Degree of stenosis measured relative to the diameter of the normal  internal carotid artery per NASCET or NASCET type criteria      SRUTHI FORDE MD           ASSESSMENT:  1.  6 years status post right carotid endarterectomy with stable less than 50% right carotid stenosis.   2.  50-79% asymptomatic left carotid stenosis.  Some progression of disease over the past 2 years but not yet in need of intervention.  3.  Complaints of decreased urinary stream with history of bladder and prostate carcinoma.        PLAN:  He will continue his medical regimen which includes aspirin and Plavix.  I will see him back again in 1 year for repeat left carotid ultrasonography.  The right carotid ultrasound is stable and does not need to be followed annually.  Near the completion of our encounter he reluctantly admits to decreased urinary stream with the above noted  history.  He is followed by Dr. Wilmar Pride who has previously performed bladder fulguration.  I strongly suggested to Don and his wife that they contact Dr. Pride this afternoon to schedule an expedited urologic follow-up.  They verbalized understanding and indicate they will schedule an appointment with Dr. Pride in an expedited manner.        Homero Denton M.D.

## 2017-06-06 NOTE — MR AVS SNAPSHOT
After Visit Summary   6/6/2017    Bora Metzger    MRN: [id number]           Patient Information     Date Of Birth          1/15/1931        Visit Information        Provider Department      6/6/2017 2:30 PM Eitan Denton MD [facility]      Today's Diagnoses     History of right-sided carotid endarterectomy    -  1    Left carotid stenosis           Follow-ups after your visit        Follow-up notes from your care team     Return in about 1 year (around 6/6/2018) for Left carotid ultrasound.      Your next 10 appointments already scheduled     Sep 05, 2017 11:00 AM CDT   SHORT with Bora Hardiwck MD   [facility]    [address] [place] [address] [phone]              Who to contact     If you have questions or need follow up information about today's clinic visit or your schedule please contact [facility] directly at [phone].  Normal or non-critical lab and imaging results will be communicated to you by Sparkcentral[employer], letter or phone within 4 business days after the clinic has received the results. If you do not hear from us within 7 days, please contact the clinic through [employer]central[employer] or phone. If you have a critical or abnormal lab result, we will notify you by phone as soon as possible.  Submit refill requests through [employer] or call your pharmacy and they will forward the refill request to us. Please allow 3 business days for your refill to be completed.          Additional Information About Your Visit        MyChart Information     [employer] [facility] [employer] gives you secure access to your electronic health record. If you see a primary care provider, you can also send messages to your care team and make appointments. If you have questions, please call your primary care clinic.  If you do not have a primary care provider, please call [phone] and  they will assist you.        Care EveryWhere ID     This is your Care EveryWhere ID. This could be used by other organizations to access your [place] medical records  [id number]        Your Vitals Were     Pulse BMI (Body Mass Index)                66 24.18 kg/m2           Blood Pressure from Last 3 Encounters:   06/06/17 108/51   06/01/17 122/66   05/23/17 124/60    Weight from Last 3 Encounters:   06/06/17 159 lb (72.1 kg)   06/01/17 159 lb (72.1 kg)   05/23/17 161 lb 8 oz (73.3 kg)              Today, you had the following     No orders found for display       Primary Care Provider Office Phone # Fax #    Bora Hardwick [phone]       [address]  [facility] [address]        Thank you!     Thank you for choosing [facility]  for your care. Our goal is always to provide you with excellent care. Hearing back from our patients is one way we can continue to improve our services. Please take a few minutes to complete the written survey that you may receive in the mail after your visit with us. Thank you!             Your Updated Medication List - Protect others around you: Learn how to safely use, store and throw away your medicines at www.disposemymeds.org.          This list is accurate as of: 6/6/17  6:40 PM.  Always use your most recent med list.                   Brand Name Dispense Instructions for use    aspirin 81 MG tablet     100 tablet    Take 1 tablet (81 mg) by mouth daily       blood glucose monitoring lancets     1 Box    Use to test blood sugar 1 times daily or as directed.       blood glucose monitoring meter device kit     1 kit    Use to test blood sugar 1 times daily or as directed.       * blood glucose monitoring test strip    no brand specified    100 each    1 strip by In Vitro route daily Comfort curve test stripes       * blood glucose monitoring test strip    ACCU-CHEK SMILEY PLUS    100 each    Use to test blood sugar 1  times daily or as directed.       clopidogrel 75 MG tablet    PLAVIX    90 tablet    Take 1 tablet (75 mg) by mouth daily       COENZYME Q-10 PO      Take 1 tablet by mouth daily       DULoxetine 20 MG EC capsule    CYMBALTA    180 capsule    Take 1 capsule (20 mg) by mouth 2 times daily       evolocumab 140 MG/ML prefilled autoinjector    REPATHA    2 each    Inject 1 mL (140 mg) Subcutaneous every 14 days       HYDROcodone-acetaminophen 5-325 MG per tablet    NORCO    90 tablet    Take 1 tablet by mouth every 6 hours as needed for pain #90 for three months supply.  Do not take with tramadol. (Patient only takes when tramadol does not work)       hydrocortisone 2.5 % cream    ANUSOL-HC    30 g    Place rectally 2 times daily as needed for hemorrhoids Do not apply for more that two weeks at a time       IMDUR 30 MG 24 hr tablet   Generic drug:  isosorbide mononitrate      1-2 tabs daily       levocetirizine 5 MG tablet    XYZAL    90 tablet    Take 1 tablet (5 mg) by mouth every evening       levothyroxine 25 MCG tablet    SYNTHROID/LEVOTHROID    90 tablet    Take 1 tablet (25 mcg) by mouth daily       * lidocaine 5 % ointment    XYLOCAINE    50 g    Apply topically as needed for moderate pain       * lidocaine 5 % Patch    LIDODERM    30 patch    Place 1 patch onto the skin as needed Over painful areas       lisinopril 2.5 MG tablet    PRINIVIL/Zestril    180 tablet    Take 1 tablet (2.5 mg) by mouth every evening       LORazepam 0.5 MG tablet    ATIVAN    60 tablet    Take 1-2 tablets (0.5-1 mg) by mouth nightly as needed Take 1-2 tabs by mouth as needed for anxiety and or sleeping-       metFORMIN 500 MG 24 hr tablet    GLUCOPHAGE-XR    360 tablet    Take 2 tablets (1,000 mg) by mouth 2 times daily (with meals)       metoprolol 50 MG tablet    LOPRESSOR    180 tablet    Take 1 tablet (50 mg) by mouth 2 times daily       Multi-vitamin Tabs tablet      Take 1 tablet by mouth daily       * nitroglycerin 0.4 MG  sublingual tablet    NITROSTAT    100 tablet    1 TAB EVERY 5 MIN AS NEEDED, UP TO 3 PER EPISODE - Pt may  4 bottles of 25 at a time       * nitroglycerin 0.4 MG/HR 24 hr patch    NITRODUR    30 patch    Place 1 patch onto the skin daily       tiZANidine 2 MG tablet    ZANAFLEX    30 tablet    Take 0.5-1 tablets (1-2 mg) by mouth 2 times daily as needed for muscle spasms       traMADol-acetaminophen 37.5-325 MG per tablet    ULTRACET    120 tablet    Two every morning and two at dinnertime as needed , discussed/knows not to use with vicodin since both had acetaminophen and would be over-sedating.       * Notice:  This list has 6 medication(s) that are the same as other medications prescribed for you. Read the directions carefully, and ask your doctor or other care provider to review them with you.

## 2017-06-06 NOTE — NURSING NOTE
"Chief Complaint   Patient presents with     RECHECK     Bilateral carotid (1:30 VHC; 2:15 TJG) History of right CEA; 3/14/2009 follow up       Initial /51 (BP Location: Right arm, Patient Position: Chair, Cuff Size: Adult Large)  Pulse 66  Wt 72.1 kg (159 lb)  BMI 24.18 kg/m2 Estimated body mass index is 24.18 kg/(m^2) as calculated from the following:    Height as of 6/1/17: 1.727 m (5' 8\").    Weight as of this encounter: 72.1 kg (159 lb).  Medication Reconciliation: complete     Face to Face nursing time 7 minutes     Margoth Pedroza CMA      "

## 2017-06-14 NOTE — TELEPHONE ENCOUNTER
Received letter from [employer] stating pt is eligible to receive Repatha through the foundation from 6/14/17-12/31/17. They will be contacting pt to set up shipment date/time. Pt's wife said he still has samples to last him through the end of the month. She will call the [employer] if she has not heard from them in next few days. Letter sent to be scanned into [employer]. Zara REY

## 2017-06-19 NOTE — TELEPHONE ENCOUNTER
6-19-17      CLAUDIA is speaking with his wife Cortney today -- mtm restarted him on glipizide last week.    He is still somewhat tired, sleeps a lot , lacks energy , motivation but he's better than when on just metformin.       Cortney-- Don's bs;s :    Date FBG/ 2hours post Lunch/2hours post Dinner /2hours post    6-14-17    121 @12;47am   6-15  127-noon 169 5;20pm 153 at midnight   6-16 111 at 8;40am   177 11;01pm   6-17 128 8;30am   171 10;56pm   6-18 134 at 8am   174 at 11pm   6-19 153 at 10am .                    Plan:    1. Stay on current regimen of glip + metformin as is for now.    Johnathan Doe [facility].  Medication Therapy Management Provider  [phone]

## 2017-06-19 NOTE — TELEPHONE ENCOUNTER
6-19-17    Wife maicol is calling with don's bs's past week --they were not available  Via phone today -- MTM Lm for them to mychart me his past weeks blood sugars .    Johnathan Doe [facility].  Medication Therapy Management Provider  [phone]

## 2017-08-02 NOTE — MR AVS SNAPSHOT
After Visit Summary   8/2/2017    Bora Metzger    MRN: [id number]           Patient Information     Date Of Birth          1/15/1931        Visit Information        Provider Department      8/2/2017 3:20 PM Shweta Finch APRN CNP [facility]        Today's Diagnoses     Acute maxillary sinusitis, recurrence not specified    -  1      Care Instructions    For today, I am treating you for a sinus infection.  Take your antibiotics as prescribed.  Make sure to keep drinking your water and getting your fluids.  Monitor your dizziness. Make slow, purposeful position changes and be careful.  I believe your dizziness is related to your fluid and food intake.   Work on eating larger portions.  Consider smoothies, ensure, boost, carnation instant breakfast, etc.  Follow up with Dr. Hardwick.    If your symptoms worsen in anyway, be rechecked or go to the ER.            Follow-ups after your visit        Your next 10 appointments already scheduled     Aug 16, 2017  3:10 PM CDT   Return Visit with LEENA Avila CNP   [facility] [place] ([facility] [address] [address] [phone]            Sep 05, 2017 11:00 AM CDT   SHORT with Bora Hardwick MD   [facility] ([facility])    [address] [place] [address] [phone]              Who to contact     If you have questions or need follow up information about today's clinic visit or your schedule please contact [facility] directly at [phone].  Normal or non-critical lab and imaging results will be communicated to you by MyChart, letter or phone within 4 business days after the clinic has received the results. If you do not hear from us within 7 days, please contact the clinic through MyChart or phone. If you have a critical or abnormal lab result, we will  "notify you by phone as soon as possible.  Submit refill requests through [employer] or call your pharmacy and they will forward the refill request to us. Please allow 3 business days for your refill to be completed.          Additional Information About Your Visit        [employer] Information     [employer] gives you secure access to your electronic health record. If you see a primary care provider, you can also send messages to your care team and make appointments. If you have questions, please call your primary care clinic.  If you do not have a primary care provider, please call [phone] and they will assist you.        Care EveryWhere ID     This is your Care EveryWhere ID. This could be used by other organizations to access your [facility] medical records  [id number]        Your Vitals Were     Pulse Temperature Respirations Height Pulse Oximetry BMI (Body Mass Index)    68 97.5  F (36.4  C) (Oral) 12 5' 8\" (1.727 m) 97% 22.35 kg/m2       Blood Pressure from Last 3 Encounters:   08/02/17 103/57   06/06/17 108/51   06/01/17 122/66    Weight from Last 3 Encounters:   08/02/17 147 lb (66.7 kg)   06/06/17 159 lb (72.1 kg)   06/01/17 159 lb (72.1 kg)              Today, you had the following     No orders found for display         Today's Medication Changes          These changes are accurate as of: 8/2/17  3:47 PM.  If you have any questions, ask your nurse or doctor.               Start taking these medicines.        Dose/Directions    azithromycin 250 MG tablet   Commonly known as:  ZITHROMAX   Used for:  Acute maxillary sinusitis, recurrence not specified   Started by:  Shweta Finch APRN CNP        Two tablets first day, then one tablet daily for four days.   Quantity:  6 tablet   Refills:  0            Where to get your medicines      These medications were sent to [facility] - [place], [place] - [address]  [address], [place] [address]     Phone:  [phone]     " azithromycin 250 MG tablet                Primary Care Provider Office Phone # Fax #    Bora Hardwick [phone]       [facility] [address]  [facility] [address]        Equal Access to Services     RAHEEM ARTHUR : Hadcooper venita suárez cherryo Somartyali, waaxda luqadaha, qaybta kaalmada adeegyada, waxabdi osorion magen silva ibrahima lyon. So [facility] [phone].    ATENCIÓN: Si habla español, tiene a saunders disposición servicios gratuitos de asistencia lingüística. Llame al [phone].    We comply with applicable federal civil rights laws and [place] laws. We do not discriminate on the basis of race, color, national origin, age, disability sex, sexual orientation or gender identity.            Thank you!     Thank you for choosing [facility]  for your care. Our goal is always to provide you with excellent care. Hearing back from our patients is one way we can continue to improve our services. Please take a few minutes to complete the written survey that you may receive in the mail after your visit with us. Thank you!             Your Updated Medication List - Protect others around you: Learn how to safely use, store and throw away your medicines at www.disposemymeds.org.          This list is accurate as of: 8/2/17  3:47 PM.  Always use your most recent med list.                   Brand Name Dispense Instructions for use Diagnosis    aspirin 81 MG tablet     100 tablet    Take 1 tablet (81 mg) by mouth daily    CAD (coronary artery disease)       azithromycin 250 MG tablet    ZITHROMAX    6 tablet    Two tablets first day, then one tablet daily for four days.    Acute maxillary sinusitis, recurrence not specified       blood glucose monitoring lancets     1 Box    Use to test blood sugar 1 times daily or as directed.    Type 2 diabetes, HbA1C goal < 8% (H)       blood glucose monitoring meter device kit     1 kit    Use to test blood sugar 1 times daily or as directed.     Type 2 diabetes, HbA1C goal < 8% (H)       * blood glucose monitoring test strip    no brand specified    100 each    1 strip by In Vitro route daily Comfort curve test stripes    Type 2 diabetes, HbA1C goal < 8% (H)       * blood glucose monitoring test strip    ACCU-CHEK SMILEY PLUS    100 each    Use to test blood sugar 1 times daily or as directed.    Type 2 diabetes, HbA1C goal < 8% (H)       clopidogrel 75 MG tablet    PLAVIX    90 tablet    Take 1 tablet (75 mg) by mouth daily    Coronary artery disease involving native heart, angina presence unspecified, unspecified vessel or lesion type       COENZYME Q-10 PO      Take 1 tablet by mouth daily        DULoxetine 20 MG EC capsule    CYMBALTA    180 capsule    Take 1 capsule (20 mg) by mouth 2 times daily    Chronic pain syndrome, Type 2 diabetes mellitus with diabetic polyneuropathy, without long-term current use of insulin (H), Bilateral claudication of lower limb (H)       evolocumab 140 MG/ML prefilled autoinjector    REPATHA    2 each    Inject 1 mL (140 mg) Subcutaneous every 14 days    Statin intolerance       HYDROcodone-acetaminophen 5-325 MG per tablet    NORCO    90 tablet    Take 1 tablet by mouth every 6 hours as needed for pain #90 for three months supply.  Do not take with tramadol. (Patient only takes when tramadol does not work)    DDD (degenerative disc disease), cervical, Chronic pain syndrome       hydrocortisone 2.5 % cream    ANUSOL-HC    30 g    Place rectally 2 times daily as needed for hemorrhoids Do not apply for more that two weeks at a time    Internal hemorrhoids       IMDUR 30 MG 24 hr tablet   Generic drug:  isosorbide mononitrate      1-2 tabs daily        levocetirizine 5 MG tablet    XYZAL    90 tablet    Take 1 tablet (5 mg) by mouth every evening    Seasonal allergies       levothyroxine 25 MCG tablet    SYNTHROID/LEVOTHROID    90 tablet    Take 1 tablet (25 mcg) by mouth daily    Subclinical hypothyroidism       * lidocaine 5  % ointment    XYLOCAINE    50 g    Apply topically as needed for moderate pain    Chronic pain syndrome       * lidocaine 5 % Patch    LIDODERM    30 patch    Place 1 patch onto the skin as needed Over painful areas    DDD (degenerative disc disease), cervical, DDD (degenerative disc disease), lumbar       lisinopril 2.5 MG tablet    PRINIVIL/Zestril    180 tablet    Take 1 tablet (2.5 mg) by mouth every evening    Essential hypertension with goal blood pressure less than 140/90       LORazepam 0.5 MG tablet    ATIVAN    60 tablet    Take 1-2 tablets (0.5-1 mg) by mouth nightly as needed Take 1-2 tabs by mouth as needed for anxiety and or sleeping-    Anxiety, Chronic pain syndrome       metFORMIN 500 MG 24 hr tablet    GLUCOPHAGE-XR    360 tablet    Take 2 tablets (1,000 mg) by mouth 2 times daily (with meals)    Type 2 diabetes mellitus without complication, without long-term current use of insulin (H)       metoprolol 50 MG tablet    LOPRESSOR    180 tablet    Take 1 tablet (50 mg) by mouth 2 times daily    Hypertension goal BP (blood pressure) < 140/90       Multi-vitamin Tabs tablet      Take 1 tablet by mouth daily        * nitroGLYcerin 0.4 MG sublingual tablet    NITROSTAT    100 tablet    1 TAB EVERY 5 MIN AS NEEDED, UP TO 3 PER EPISODE - Pt may  4 bottles of 25 at a time    Chronic ischemic heart disease, unspecified       * nitroGLYcerin 0.4 MG/HR 24 hr patch    NITRODUR    30 patch    Place 1 patch onto the skin daily    Chronic atrial fibrillation (H)       tiZANidine 2 MG tablet    ZANAFLEX    30 tablet    Take 0.5-1 tablets (1-2 mg) by mouth 2 times daily as needed for muscle spasms    Chronic pain syndrome, Pain in joint, multiple sites       traMADol-acetaminophen 37.5-325 MG per tablet    ULTRACET    120 tablet    Two every morning and two at dinnertime as needed , discussed/knows not to use with vicodin since both had acetaminophen and would be over-sedating.    DDD (degenerative disc  disease), cervical       * Notice:  This list has 6 medication(s) that are the same as other medications prescribed for you. Read the directions carefully, and ask your doctor or other care provider to review them with you.

## 2017-08-02 NOTE — PATIENT INSTRUCTIONS
For today, I am treating you for a sinus infection.  Take your antibiotics as prescribed.  Make sure to keep drinking your water and getting your fluids.  Monitor your dizziness. Make slow, purposeful position changes and be careful.  I believe your dizziness is related to your fluid and food intake.   Work on eating larger portions.  Consider smoothies, ensure, boost, carnation instant breakfast, etc.  Follow up with Dr. Hardwick.    If your symptoms worsen in anyway, be rechecked or go to the ER.

## 2017-08-02 NOTE — NURSING NOTE
"Chief Complaint   Patient presents with     URI     dizziness, sinus       Initial /57 (BP Location: Left arm, Patient Position: Sitting, Cuff Size: Adult Large)  Pulse 68  Temp 97.5  F (36.4  C) (Oral)  Resp 12  Ht 5' 8\" (1.727 m)  Wt 147 lb (66.7 kg)  SpO2 97%  BMI 22.35 kg/m2 Estimated body mass index is 22.35 kg/(m^2) as calculated from the following:    Height as of this encounter: 5' 8\" (1.727 m).    Weight as of this encounter: 147 lb (66.7 kg).  Medication Reconciliation: complete     sma Vidya  "

## 2017-08-02 NOTE — PROGRESS NOTES
"  SUBJECTIVE:                                                    Bora Metzger is a 86 year old male who presents to clinic today for the following health issues:  Chief Complaint   Patient presents with     URI     dizziness, sinus       RESPIRATORY SYMPTOMS    Duration: patient states a week or two    Description  Bora has a complicated medical history including heart problems.   Recently he has been having problems with sinus pain, facial pain/pressure.  Runny nose.  Today he is in clinic for the URI symptoms but he is also having some dizziness.  He says this is worse when he gets up and he feels like he is going to pass out.  He reports that he is eating and drinking well, but his wife states he \"eats like a bird.\"  It is noted that his weight is down 12# since June 2017.  History of sinusitis.        Severity: severe and not getting better with time.     Accompanying signs and symptoms: None    History (predisposing factors):  + sinusitis history     Precipitating or alleviating factors: None    Therapies tried and outcome:  Normal pain medications that cover all his other pain, patient states. Also he states it hurts to turn his neck    Past Medical History:   Diagnosis Date     Acute myocardial infarction, unspecified site, episode of care unspecified 1995     Anxiety      Arrhythmia     PAC, PVC     Arthritis      Atrial fibrillation (H)      Bilateral claudication of lower limb (H)      Bladder cancer (H) 2013     Carotid artery disease (H)     right carotid endarterectomy 2009     Chronic pain syndrome      Coronary artery disease     CVS rec lifelong plavix due to diffuse CAD     ED (erectile dysfunction)     since surgery and XRT     Epididymitis      Essential hypertension, benign      Herpes zoster without mention of complication 2007     History of radiation therapy     prostate, 2001 (37 treatments)     Hypothyroidism      Malignant neoplasm of prostate (H)      NSTEMI (non-ST elevated " myocardial infarction) (H) 9/2015 9/2015 Heart cath - severe native vessel CAD, patent LIMA to LAD and SVG to RCA, occluded SVG to 1st diagonal (likely chronic)     Osteoporosis      Other and unspecified hyperlipidemia     statin intolerance     Postsurgical aortocoronary bypass status 2004    [place], VG-RI, VG-RCA (cath 2011 grafts open, only potential ischemia prox [place] before [place])     Stented coronary artery     prior to CAB: [place], [place], brachyRx     Type II or unspecified type diabetes mellitus without mention of complication, not stated as uncontrolled      Unspecified hearing loss      Current Outpatient Prescriptions   Medication Sig Dispense Refill     metFORMIN (GLUCOPHAGE-XR) 500 MG 24 hr tablet Take 2 tablets (1,000 mg) by mouth 2 times daily (with meals) 360 tablet 1     HYDROcodone-acetaminophen (NORCO) 5-325 MG per tablet Take 1 tablet by mouth every 6 hours as needed for pain #90 for three months supply.  Do not take with tramadol. (Patient only takes when tramadol does not work) 90 tablet 0     LORazepam (ATIVAN) 0.5 MG tablet Take 1-2 tablets (0.5-1 mg) by mouth nightly as needed Take 1-2 tabs by mouth as needed for anxiety and or sleeping- 60 tablet 5     tiZANidine (ZANAFLEX) 2 MG tablet Take 0.5-1 tablets (1-2 mg) by mouth 2 times daily as needed for muscle spasms 30 tablet 1     traMADol-acetaminophen (ULTRACET) 37.5-325 MG per tablet Two every morning and two at dinnertime as needed , discussed/knows not to use with vicodin since both had acetaminophen and would be over-sedating. 120 tablet 2     isosorbide mononitrate (IMDUR) 30 MG 24 hr tablet 1-2 tabs daily       metoprolol (LOPRESSOR) 50 MG tablet Take 1 tablet (50 mg) by mouth 2 times daily 180 tablet 1     levothyroxine (SYNTHROID/LEVOTHROID) 25 MCG tablet Take 1 tablet (25 mcg) by mouth daily 90 tablet 2     DULoxetine (CYMBALTA) 20 MG EC capsule Take 1 capsule (20 mg) by mouth 2 times daily 180 capsule 1     evolocumab (REPATHA) 140  MG/ML prefilled autoinjector Inject 1 mL (140 mg) Subcutaneous every 14 days 2 each 12     nitroglycerin (NITRODUR) 0.4 MG/HR 24 hr patch Place 1 patch onto the skin daily 30 patch 12     lidocaine (LIDODERM) 5 % patch Place 1 patch onto the skin as needed Over painful areas 30 patch 5     lisinopril (PRINIVIL,ZESTRIL) 2.5 MG tablet Take 1 tablet (2.5 mg) by mouth every evening 180 tablet 2     nitroglycerin (NITROSTAT) 0.4 MG SL tablet 1 TAB EVERY 5 MIN AS NEEDED, UP TO 3 PER EPISODE - Pt may  4 bottles of 25 at a time 100 tablet 3     clopidogrel (PLAVIX) 75 MG tablet Take 1 tablet (75 mg) by mouth daily 90 tablet 3     lidocaine (XYLOCAINE) 5 % ointment Apply topically as needed for moderate pain 50 g 3     blood glucose monitoring (ACCU-CHEK SMILEY PLUS) test strip Use to test blood sugar 1 times daily or as directed. 100 each 11     blood glucose monitoring (SOFTCLIX) lancets Use to test blood sugar 1 times daily or as directed. 1 Box 11     levocetirizine (XYZAL) 5 MG tablet Take 1 tablet (5 mg) by mouth every evening 90 tablet 2     hydrocortisone (ANUSOL-HC) 2.5 % rectal cream Place rectally 2 times daily as needed for hemorrhoids Do not apply for more that two weeks at a time 30 g 2     COENZYME Q-10 PO Take 1 tablet by mouth daily       aspirin 81 MG tablet Take 1 tablet (81 mg) by mouth daily 100 tablet 1     blood glucose monitoring (ACCU-CHEK SMILEY PLUS) meter device kit Use to test blood sugar 1 times daily or as directed. 1 kit 0     glucose blood VI test strips strip 1 strip by In Vitro route daily Comfort curve test stripes 100 each 3     multivitamin, therapeutic with minerals (MULTI-VITAMIN) TABS Take 1 tablet by mouth daily       Social History   Substance Use Topics     Smoking status: Never Smoker     Smokeless tobacco: Never Used     Alcohol use No       ROS:  Constitutional, HEENT, cardiovascular, pulmonary, GI, , musculoskeletal, neuro, skin, endocrine and psych systems are negative,  "except as otherwise noted.      OBJECTIVE  :/57 (BP Location: Left arm, Patient Position: Sitting, Cuff Size: Adult Large)  Pulse 68  Temp 97.5  F (36.4  C) (Oral)  Resp 12  Ht 5' 8\" (1.727 m)  Wt 147 lb (66.7 kg)  SpO2 97%  BMI 22.35 kg/m2  EXAM:  Constitutional: healthy, alert, active and mild distress  Neck: Neck supple. No adenopathy.  ENT: Bilateral TM's are normal.  Posterior oropharynx is clear.  Nares clear without congestion.  + sinus pain with palpation.   Cardiovascular: S1, S2  Respiratory: Respirations easy and regular. No respiratory distress. Lungs sounds CTA.  Skin: warm and dry  Psychiatric: mentation appears normal. and affect normal/bright      ASSESSMENT:  (J01.00) Acute maxillary sinusitis, recurrence not specified  (primary encounter diagnosis)  Comment:   Plan: azithromycin (ZITHROMAX) 250 MG tablet            (R63.4) Loss of weight  Comment:   Plan: I discussed with the patient my concern about his loss of weight.  He is to work on increasing his caloric intake and follow up with Dr. Hardwick.  See below.    (I10) Hypertension goal BP (blood pressure) < 140/90  Comment:   Plan:     Patient Instructions   For today, I am treating you for a sinus infection.  Take your antibiotics as prescribed.  Make sure to keep drinking your water and getting your fluids.  Monitor your dizziness. Make slow, purposeful position changes and be careful.  I believe your dizziness is related to your fluid and food intake.   Work on eating larger portions.  Consider smoothies, ensure, boost, carnation instant breakfast, etc.  Follow up with Dr. Hardwick.    If your symptoms worsen in anyway, be rechecked or go to the ER.          I spent a total of 30 min with the patient, >50% time spent face to face counseling regarding the above issues, reviewing his diet/calories/protein/etc, establishing a plan of care, and coordinating follow up care with PCP.       "

## 2017-08-16 NOTE — PATIENT INSTRUCTIONS
Make sure you are eating enough, I don't want you to continue to loose weight.    I suspect since your symptoms have been better the last three days, that the lightheadedness and dizziness are from the sinus infection.     [place] [phone]

## 2017-08-16 NOTE — LETTER
8/16/2017    Bora Hardwick MD  [address]  [facility] [address]    RE: Bora HAND Domenica       Dear Colleague,    I had the pleasure of seeing Bora Metzger in the [facility].    HPI and Plan:   I had the pleasure of seeing Bora Metzger and his wife in Cardiology Clinic at his request.  He is a pleasant 85-year-old patient of Dr. Carr with extensive complex history of coronary artery disease described in detail in Dr. Carr note from 12/2015.  His last ischemic workup was a catheterization in the fall of 2015.  His LIMA to the LAD was patent but the LAD was diffusely diseased, both proximally and distal to the graft.  The vein graft to the RCA was open but with severe narrowing in the left posterior lateral branch.  The vein graft to the ramus was totally occluded.  The left circumflex had a moderately severe narrowing in the distal circumflex before the terminal posterior lateral branch from the left and there was also more severe narrowing in the distal OM.  Dr. Carr felt some of those areas could be stented but there were too many areas that potentially could be causing had his angina that ultimately medical management has been pursued.  He has several medication intolerances, including statin intolerances.  He is done exceptionally well on Repatha.  He follows with Dr. Denton for his carotid disease and has had a previous carotid endarterectomy.  He also has a history of bladder cancer.    Mr. Metzger asked to be seen a couple of weeks ago but did not get into see me until today.  He has some component of chronic lightheadedness and dizziness, he says it's at least been going on since December.  He notices it sometimes when he gets up in the morning or when he goes from sit to stand and then walks around, he doesn't get it initially when he goes to stand up but only when he searched and removed afterwards.  He does not feel like the room is spinning.  The episodes can  last for 30 seconds.  He can take a while to recover from them.  He has no associated chest pain, palpitations, shortness of breath, though sometimes he thinks maybe his heart is beating faster.  He denies any syncope, cannot say whether he has presyncope.  A month ago or so he stopped his nitroglycerin patch, he is not convinced that this helped his symptoms.  He eventually went to his primary care provider who treated him for a bacterial sinus infection, he has been off of antibiotics for at least a week now.  The symptoms he had when he scheduled appointment subsided about three days ago.  Today he tells he actually feels quite well, this is the first time he has ever told me that.  His main complaint today is some neck pain and he has wife wonder who may be the best person to treat that.  I did check his orthostatic blood pressures, sitting his blood pressure was 138/80, standing after two minutes it was 120/70, he was asymptomatic.  He has had an unexplained 10 pound weight loss in the last three months, he and his wife tell me he just has no appetite.    Labs from March 2017, cholesterol 158, HDL 52, LDL 63, triglycerides 217    PHYSICAL EXAMINATION:   GENERAL:  An 85-year-old male, appears comfortable, ambulates independently.   VITAL SIGNS:  Today blood pressure 126/52, heart rate 72, weight 156 pounds, BMI 24.5.   LUNGS:  Bilaterally clear to auscultation.   CARDIOVASCULAR:  Rate was regular, normal S1 and S2.   EXTREMITIES:  No lower extremity edema.     ASSESSMENT AND PLAN:   1.   Lightheadedness and dizziness.  The symptoms were more profound two weeks ago, in fact today he tells me he has not had symptoms at all for the last three days and has felt better than ever.  He does have a little drop in blood pressure when going from sit to stand but he does not have any symptoms with this and I don't think his symptoms are orthostatic hypotension.  He he does not appear dehydrated.    2.  Coronary artery  disease, diffuse and not clearly amenable to intervention.  He is stable and has done well off the nitroglycerin patch without recurrent chest pain.  He wonders if ever pass the has possibly reduced his angina, we discussed the angiogram would be the only way to say with any certainty.  He continues to have chronic neck pain.  He continues on beta blocker, Plavix, ACE inhibitor and low-dose aspirin.   3.  Atrial fibrillation.  He is rate controlled and on Plavix lifelong at the recommendation of CV Surgery which is the reason he is not on Coumadin.     4.  Statin intolerance.  He is doing very well on Repatha.  We will recheck his cholesterol when he sees Dr. Carr in annual follow-up.    Thank you for allowing me to see Mr. Metzger in today.  I suspect his lightheadedness and dizziness was due to his sinus infection, though there could be a component of hypotension to it.  I'm a little concerned about his unintended temporal weight loss since he saw Dr. Carr in May.  I recommend that he make sure he is eating enough calories, he could try supplements, and sounds like he maybe has already.    No orders of the defined types were placed in this encounter.      No orders of the defined types were placed in this encounter.      There are no discontinued medications.      Encounter Diagnosis   Name Primary?     Coronary artery disease due to lipid rich plaque        CURRENT MEDICATIONS:  Current Outpatient Prescriptions   Medication Sig Dispense Refill     azithromycin (ZITHROMAX) 250 MG tablet Two tablets first day, then one tablet daily for four days. 6 tablet 0     metFORMIN (GLUCOPHAGE-XR) 500 MG 24 hr tablet Take 2 tablets (1,000 mg) by mouth 2 times daily (with meals) 360 tablet 1     HYDROcodone-acetaminophen (NORCO) 5-325 MG per tablet Take 1 tablet by mouth every 6 hours as needed for pain #90 for three months supply.  Do not take with tramadol. (Patient only takes when tramadol does not work) 90 tablet 0      LORazepam (ATIVAN) 0.5 MG tablet Take 1-2 tablets (0.5-1 mg) by mouth nightly as needed Take 1-2 tabs by mouth as needed for anxiety and or sleeping- 60 tablet 5     tiZANidine (ZANAFLEX) 2 MG tablet Take 0.5-1 tablets (1-2 mg) by mouth 2 times daily as needed for muscle spasms 30 tablet 1     traMADol-acetaminophen (ULTRACET) 37.5-325 MG per tablet Two every morning and two at dinnertime as needed , discussed/knows not to use with vicodin since both had acetaminophen and would be over-sedating. 120 tablet 2     isosorbide mononitrate (IMDUR) 30 MG 24 hr tablet 1-2 tabs daily       metoprolol (LOPRESSOR) 50 MG tablet Take 1 tablet (50 mg) by mouth 2 times daily 180 tablet 1     levothyroxine (SYNTHROID/LEVOTHROID) 25 MCG tablet Take 1 tablet (25 mcg) by mouth daily 90 tablet 2     DULoxetine (CYMBALTA) 20 MG EC capsule Take 1 capsule (20 mg) by mouth 2 times daily 180 capsule 1     evolocumab (REPATHA) 140 MG/ML prefilled autoinjector Inject 1 mL (140 mg) Subcutaneous every 14 days 2 each 12     nitroglycerin (NITRODUR) 0.4 MG/HR 24 hr patch Place 1 patch onto the skin daily 30 patch 12     lidocaine (LIDODERM) 5 % patch Place 1 patch onto the skin as needed Over painful areas 30 patch 5     lisinopril (PRINIVIL,ZESTRIL) 2.5 MG tablet Take 1 tablet (2.5 mg) by mouth every evening 180 tablet 2     nitroglycerin (NITROSTAT) 0.4 MG SL tablet 1 TAB EVERY 5 MIN AS NEEDED, UP TO 3 PER EPISODE - Pt may  4 bottles of 25 at a time 100 tablet 3     clopidogrel (PLAVIX) 75 MG tablet Take 1 tablet (75 mg) by mouth daily 90 tablet 3     lidocaine (XYLOCAINE) 5 % ointment Apply topically as needed for moderate pain 50 g 3     blood glucose monitoring (ACCU-CHEK SMILEY PLUS) test strip Use to test blood sugar 1 times daily or as directed. 100 each 11     blood glucose monitoring (SOFTCLIX) lancets Use to test blood sugar 1 times daily or as directed. 1 Box 11     levocetirizine (XYZAL) 5 MG tablet Take 1 tablet (5 mg) by  "mouth every evening 90 tablet 2     hydrocortisone (ANUSOL-HC) 2.5 % rectal cream Place rectally 2 times daily as needed for hemorrhoids Do not apply for more that two weeks at a time 30 g 2     COENZYME Q-10 PO Take 1 tablet by mouth daily       aspirin 81 MG tablet Take 1 tablet (81 mg) by mouth daily 100 tablet 1     blood glucose monitoring (ACCU-CHEK SMILEY PLUS) meter device kit Use to test blood sugar 1 times daily or as directed. 1 kit 0     glucose blood VI test strips strip 1 strip by In Vitro route daily Comfort curve test stripes 100 each 3     multivitamin, therapeutic with minerals (MULTI-VITAMIN) TABS Take 1 tablet by mouth daily         ALLERGIES     Allergies   Allergen Reactions     Insulin Anaphylaxis     Had this in hospital.  Unsure of which insulin.   \"went crazy\"    Caused shock.     Amlodipine Besylate      Heart pounds uncontrollably      Contrast Dye      Developed itchy rash .     Crestor [Rosuvastatin Calcium]      Severe muscle pains     Gabapentin Diarrhea     Hmg-Coa-R Inhibitors      Levalo-- reaction, ended up in the hospital     Livalo [Pitavastatin]      After 7 days --severe muscle pains, joint pains.     Naprosyn [Naproxen]      Stomach disorder     Penicillins      rash     Welchol [Colesevelam] GI Disturbance     Ineffective on cholesterol     Zetia [Ezetimibe] Cramps     Ineffective on cholesterol       PAST MEDICAL HISTORY:  Past Medical History:   Diagnosis Date     Acute myocardial infarction, unspecified site, episode of care unspecified 1995     Anxiety      Arrhythmia     PAC, PVC     Arthritis      Atrial fibrillation (H)      Bilateral claudication of lower limb (H)      Bladder cancer (H) 2013     Carotid artery disease (H)     right carotid endarterectomy 2009     Chronic pain syndrome      Coronary artery disease     CVS rec lifelong plavix due to diffuse CAD     ED (erectile dysfunction)     since surgery and XRT     Epididymitis      Essential hypertension, benign  "     Herpes zoster without mention of complication 2007     History of radiation therapy     prostate, 2001 (37 treatments)     Hypothyroidism      Malignant neoplasm of prostate (H)      NSTEMI (non-ST elevated myocardial infarction) (H) 9/2015 9/2015 Heart cath - severe native vessel CAD, patent LIMA to LAD and SVG to RCA, occluded SVG to 1st diagonal (likely chronic)     Osteoporosis      Other and unspecified hyperlipidemia     statin intolerance     Postsurgical aortocoronary bypass status 2004    Nation-Lad, VG-RI, VG-RCA (cath 2011 grafts open, only potential ischemia prox Lad before Lima)     Stented coronary artery     prior to CAB: Lad, RI, brachyRx     Type II or unspecified type diabetes mellitus without mention of complication, not stated as uncontrolled      Unspecified hearing loss        PAST SURGICAL HISTORY:  Past Surgical History:   Procedure Laterality Date     APPENDECTOMY  1970     bcg treatment  2011-12    12 total     C CABG, ARTERIAL, THREE  2004    triple lad, ri, rca     CAROTID ENDARTERECTOMY  2009    right side     CHOLECYSTECTOMY, OPEN  1987     COLONOSCOPY N/A 2/27/2015    Procedure: COMBINED COLONOSCOPY, SINGLE OR MULTIPLE BIOPSY/POLYPECTOMY BY BIOPSY;  Surgeon: Kenroy Licona MD;  Location:  GI     COLONOSCOPY N/A 10/28/2016    Procedure: COMBINED COLONOSCOPY, SINGLE OR MULTIPLE BIOPSY/POLYPECTOMY BY BIOPSY;  Surgeon: Kenroy Licona MD;  Location:  GI     CORONARY ANGIOGRAPHY ADULT ORDER  11/21/2011    all grafts open (only poss area of ischemia is Lad origin closed and Liima to Lad ok but small prox section filled via diag graft retro)     CORONARY ANGIOGRAPHY ADULT ORDER  1/28/2004     CORONARY ANGIOGRAPHY ADULT ORDER  9/2015    severe native vessel CAD, patent LIMA to LAD and SVG to RCA, occluded SVG to 1st diagonal (likely chronic     CORONARY ARTERY BYPASS  1/2004     CABG, Nation-Lad, Vg-RI, VG-RCA (due to diffuse dz, rec life long plavix)     CORONARY ARTERY BYPASS  2004     LIMA=LAD, SVG=Ramus, SVG=Right     CYSTOSCOPY, BIOPSY BLADDER, COMBINED  3/28/2013    Procedure: COMBINED CYSTOSCOPY, BIOPSY BLADDER;  CYSTOSCOPY, BLADDER BIOPSY, FULGURATION ;  Surgeon: Rafat Pride MD;  Location: [facility] OR     CYSTOSCOPY, FULGURATE BLADDER TUMOR, COMBINED  3/28/2013    Procedure: COMBINED CYSTOSCOPY, FULGURATE BLADDER TUMOR;;  Surgeon: Rafat Pride MD;  Location: [facility] OR     ENDARTERECTOMY CAROTID   R CEA      eyelid surgery       HC CORONARY STENT PERCUT, EA ADDTL VESSEL      6 total, last      HC REMOVAL ANAL FISTULA,SUBCUTANEOUS       HC REPAIR OF NASAL SEPTUM       HEART CATH, ANGIOPLASTY  2003     BrachyTx RI (?new stent also)     HEART CATH, ANGIOPLASTY  2003     Stent RI, GEORGE stent distal LAD (60%RCA)     HEART CATH, ANGIOPLASTY  2003    PTCA-RI     HEART CATH, ANGIOPLASTY      LAD Stent     HERNIA REPAIR, INGUINAL RT/LT      and      SURGICAL HISTORY OF -       wrist surgery     SURGICAL HISTORY OF -   3/2/2011    Transurethral resection of bladder tumors     TONSILLECTOMY       TURP         FAMILY HISTORY:  Family History   Problem Relation Age of Onset     Heart Failure Mother      Coronary Artery Disease Mother       age 101 congestive heart attack     Hypertension Mother      Hyperlipidemia Mother      Thyroid Disease Mother      Myocardial Infarction Father      Coronary Artery Disease Father       age 83 heart attack     Hypertension Father      Hyperlipidemia Father      Prostate Cancer Father      Anesthesia Reaction Father      DIABETES Brother       age 51 drugs/alcohol     CEREBROVASCULAR DISEASE No family hx of        SOCIAL HISTORY:  Social History     Social History     Marital status:      Spouse name: maicol      Number of children: N/A     Years of education: 18     Occupational History            retired     Social History Main Topics     Smoking status: Never Smoker     Smokeless tobacco: Never Used  "    Alcohol use No     Drug use: No     Sexual activity: No     Other Topics Concern     Caffeine Concern Yes     occasional coffee or a diet coke     Sleep Concern No     Stress Concern Yes     related to health concerns     Weight Concern No     Special Diet No     Exercise Yes     not much     Seat Belt Yes     Parent/Sibling W/ Cabg, Mi Or Angioplasty Before 65f 55m? No     father, mother, brother all now      Social History Narrative    Patient live independently with his spouse. He is a retired .       Review of Systems:  Skin:  Positive for bruising     Eyes:  Negative      ENT:  Positive for hearing loss wears hearing aides   Respiratory:  Negative       Cardiovascular:    Positive for;chest pain;dizziness;edema;heaviness;lower extremity symptoms;palpitations;lightheadedness;fatigue dizzy, weak and neck pain  Gastroenterology: Positive for poor appetite;constipation    Genitourinary:  Positive for prostate problem;nocturia    Musculoskeletal:  Positive for arthritis    Neurologic:  Positive for headaches;numbness or tingling of feet    Psychiatric:  Positive for excessive stress;depression;sleep disturbances    Heme/Lymph/Imm:  Positive for easy bruising;allergies    Endocrine:  Positive for thyroid disorder;diabetes      Physical Exam:  Vitals: /72  Pulse 72  Ht 1.727 m (5' 8\")  Wt 68.5 kg (151 lb)  BMI 22.96 kg/m2    Constitutional:  cooperative, alert and oriented, well developed, well nourished, in no acute distress        Skin:  warm and dry to the touch, no apparent skin lesions or masses noted        Head:  normocephalic, no masses or lesions        Eyes:  pupils equal and round, conjunctivae and lids unremarkable, sclera white, no xanthalasma, EOMS intact, no nystagmus        ENT:  no pallor or cyanosis, dentition good        Neck:  carotid pulses are full and equal bilaterally, JVP normal, no carotid bruit, no thyromegaly   RCEA well healed    Chest:  normal " breath sounds, clear to auscultation, normal A-P diameter, normal symmetry, normal respiratory excursion, no use of accessory muscles     surgical scar    Cardiac: regular rhythm;normal S1 and S2       grade 1;systolic ejection murmur          Abdomen:  abdomen soft;BS normoactive        Vascular: pulses full and equal, no bruits auscultated                                        Extremities and Back:  no deformities, clubbing, cyanosis, erythema observed;no edema              Neurological:  affect appropriate, oriented to time, person and place;no gross motor deficits        Thank you for allowing me to participate in the care of your patient.    Sincerely,     LEENA Gardner [facility]

## 2017-08-16 NOTE — PROGRESS NOTES
HPI and Plan:   I had the pleasure of seeing Bora Metzger and his wife in Cardiology Clinic at his request.  He is a pleasant 85-year-old patient of Dr. Carr with extensive complex history of coronary artery disease described in detail in Dr. Carr note from 12/2015.  His last ischemic workup was a catheterization in the fall of 2015.  His LIMA to the LAD was patent but the LAD was diffusely diseased, both proximally and distal to the graft.  The vein graft to the RCA was open but with severe narrowing in the left posterior lateral branch.  The vein graft to the ramus was totally occluded.  The left circumflex had a moderately severe narrowing in the distal circumflex before the terminal posterior lateral branch from the left and there was also more severe narrowing in the distal OM.  Dr. Carr felt some of those areas could be stented but there were too many areas that potentially could be causing had his angina that ultimately medical management has been pursued.  He has several medication intolerances, including statin intolerances.  He is done exceptionally well on Repatha.  He follows with Dr. Denton for his carotid disease and has had a previous carotid endarterectomy.  He also has a history of bladder cancer.    Mr. Metzger asked to be seen a couple of weeks ago but did not get into see me until today.  He has some component of chronic lightheadedness and dizziness, he says it's at least been going on since December.  He notices it sometimes when he gets up in the morning or when he goes from sit to stand and then walks around, he doesn't get it initially when he goes to stand up but only when he searched and removed afterwards.  He does not feel like the room is spinning.  The episodes can last for 30 seconds.  He can take a while to recover from them.  He has no associated chest pain, palpitations, shortness of breath, though sometimes he thinks maybe his heart is beating faster.  He denies any  syncope, cannot say whether he has presyncope.  A month ago or so he stopped his nitroglycerin patch, he is not convinced that this helped his symptoms.  He eventually went to his primary care provider who treated him for a bacterial sinus infection, he has been off of antibiotics for at least a week now.  The symptoms he had when he scheduled appointment subsided about three days ago.  Today he tells he actually feels quite well, this is the first time he has ever told me that.  His main complaint today is some neck pain and he has wife wonder who may be the best person to treat that.  I did check his orthostatic blood pressures, sitting his blood pressure was 138/80, standing after two minutes it was 120/70, he was asymptomatic.  He has had an unexplained 10 pound weight loss in the last three months, he and his wife tell me he just has no appetite.    Labs from March 2017, cholesterol 158, HDL 52, LDL 63, triglycerides 217    PHYSICAL EXAMINATION:   GENERAL:  An 85-year-old male, appears comfortable, ambulates independently.   VITAL SIGNS:  Today blood pressure 126/52, heart rate 72, weight 156 pounds, BMI 24.5.   LUNGS:  Bilaterally clear to auscultation.   CARDIOVASCULAR:  Rate was regular, normal S1 and S2.   EXTREMITIES:  No lower extremity edema.     ASSESSMENT AND PLAN:   1.   Lightheadedness and dizziness.  The symptoms were more profound two weeks ago, in fact today he tells me he has not had symptoms at all for the last three days and has felt better than ever.  He does have a little drop in blood pressure when going from sit to stand but he does not have any symptoms with this and I don't think his symptoms are orthostatic hypotension.  He he does not appear dehydrated.    2.  Coronary artery disease, diffuse and not clearly amenable to intervention.  He is stable and has done well off the nitroglycerin patch without recurrent chest pain.  He wonders if ever pass the has possibly reduced his angina, we  discussed the angiogram would be the only way to say with any certainty.  He continues to have chronic neck pain.  He continues on beta blocker, Plavix, ACE inhibitor and low-dose aspirin.   3.  Atrial fibrillation.  He is rate controlled and on Plavix lifelong at the recommendation of CV Surgery which is the reason he is not on Coumadin.     4.  Statin intolerance.  He is doing very well on Repatha.  We will recheck his cholesterol when he sees Dr. Carr in annual follow-up.    Thank you for allowing me to see Mr. Metzger in today.  I suspect his lightheadedness and dizziness was due to his sinus infection, though there could be a component of hypotension to it.  I'm a little concerned about his unintended temporal weight loss since he saw Dr. Carr in May.  I recommend that he make sure he is eating enough calories, he could try supplements, and sounds like he maybe has already.    No orders of the defined types were placed in this encounter.      No orders of the defined types were placed in this encounter.      There are no discontinued medications.      Encounter Diagnosis   Name Primary?     Coronary artery disease due to lipid rich plaque        CURRENT MEDICATIONS:  Current Outpatient Prescriptions   Medication Sig Dispense Refill     azithromycin (ZITHROMAX) 250 MG tablet Two tablets first day, then one tablet daily for four days. 6 tablet 0     metFORMIN (GLUCOPHAGE-XR) 500 MG 24 hr tablet Take 2 tablets (1,000 mg) by mouth 2 times daily (with meals) 360 tablet 1     HYDROcodone-acetaminophen (NORCO) 5-325 MG per tablet Take 1 tablet by mouth every 6 hours as needed for pain #90 for three months supply.  Do not take with tramadol. (Patient only takes when tramadol does not work) 90 tablet 0     LORazepam (ATIVAN) 0.5 MG tablet Take 1-2 tablets (0.5-1 mg) by mouth nightly as needed Take 1-2 tabs by mouth as needed for anxiety and or sleeping- 60 tablet 5     tiZANidine (ZANAFLEX) 2 MG tablet Take 0.5-1  tablets (1-2 mg) by mouth 2 times daily as needed for muscle spasms 30 tablet 1     traMADol-acetaminophen (ULTRACET) 37.5-325 MG per tablet Two every morning and two at dinnertime as needed , discussed/knows not to use with vicodin since both had acetaminophen and would be over-sedating. 120 tablet 2     isosorbide mononitrate (IMDUR) 30 MG 24 hr tablet 1-2 tabs daily       metoprolol (LOPRESSOR) 50 MG tablet Take 1 tablet (50 mg) by mouth 2 times daily 180 tablet 1     levothyroxine (SYNTHROID/LEVOTHROID) 25 MCG tablet Take 1 tablet (25 mcg) by mouth daily 90 tablet 2     DULoxetine (CYMBALTA) 20 MG EC capsule Take 1 capsule (20 mg) by mouth 2 times daily 180 capsule 1     evolocumab (REPATHA) 140 MG/ML prefilled autoinjector Inject 1 mL (140 mg) Subcutaneous every 14 days 2 each 12     nitroglycerin (NITRODUR) 0.4 MG/HR 24 hr patch Place 1 patch onto the skin daily 30 patch 12     lidocaine (LIDODERM) 5 % patch Place 1 patch onto the skin as needed Over painful areas 30 patch 5     lisinopril (PRINIVIL,ZESTRIL) 2.5 MG tablet Take 1 tablet (2.5 mg) by mouth every evening 180 tablet 2     nitroglycerin (NITROSTAT) 0.4 MG SL tablet 1 TAB EVERY 5 MIN AS NEEDED, UP TO 3 PER EPISODE - Pt may  4 bottles of 25 at a time 100 tablet 3     clopidogrel (PLAVIX) 75 MG tablet Take 1 tablet (75 mg) by mouth daily 90 tablet 3     lidocaine (XYLOCAINE) 5 % ointment Apply topically as needed for moderate pain 50 g 3     blood glucose monitoring (ACCU-CHEK SMILEY PLUS) test strip Use to test blood sugar 1 times daily or as directed. 100 each 11     blood glucose monitoring (SOFTCLIX) lancets Use to test blood sugar 1 times daily or as directed. 1 Box 11     levocetirizine (XYZAL) 5 MG tablet Take 1 tablet (5 mg) by mouth every evening 90 tablet 2     hydrocortisone (ANUSOL-HC) 2.5 % rectal cream Place rectally 2 times daily as needed for hemorrhoids Do not apply for more that two weeks at a time 30 g 2     COENZYME Q-10 PO  "Take 1 tablet by mouth daily       aspirin 81 MG tablet Take 1 tablet (81 mg) by mouth daily 100 tablet 1     blood glucose monitoring (ACCU-CHEK SMILEY PLUS) meter device kit Use to test blood sugar 1 times daily or as directed. 1 kit 0     glucose blood VI test strips strip 1 strip by In Vitro route daily Comfort curve test stripes 100 each 3     multivitamin, therapeutic with minerals (MULTI-VITAMIN) TABS Take 1 tablet by mouth daily         ALLERGIES     Allergies   Allergen Reactions     Insulin Anaphylaxis     Had this in hospital.  Unsure of which insulin.   \"went crazy\"    Caused shock.     Amlodipine Besylate      Heart pounds uncontrollably      Contrast Dye      Developed itchy rash .     Crestor [Rosuvastatin Calcium]      Severe muscle pains     Gabapentin Diarrhea     Hmg-Coa-R Inhibitors      Levalo-- reaction, ended up in the hospital     Livalo [Pitavastatin]      After 7 days --severe muscle pains, joint pains.     Naprosyn [Naproxen]      Stomach disorder     Penicillins      rash     Welchol [Colesevelam] GI Disturbance     Ineffective on cholesterol     Zetia [Ezetimibe] Cramps     Ineffective on cholesterol       PAST MEDICAL HISTORY:  Past Medical History:   Diagnosis Date     Acute myocardial infarction, unspecified site, episode of care unspecified 1995     Anxiety      Arrhythmia     PAC, PVC     Arthritis      Atrial fibrillation (H)      Bilateral claudication of lower limb (H)      Bladder cancer (H) 2013     Carotid artery disease (H)     right carotid endarterectomy 2009     Chronic pain syndrome      Coronary artery disease     [facility] rec lifelong plavix due to diffuse CAD     ED (erectile dysfunction)     since surgery and XRT     Epididymitis      Essential hypertension, benign      Herpes zoster without mention of complication 2007     History of radiation therapy     prostate, 2001 (37 treatments)     Hypothyroidism      Malignant neoplasm of prostate (H)      NSTEMI (non-ST elevated " myocardial infarction) (H) 9/2015 9/2015 Heart cath - severe native vessel CAD, patent LIMA to LAD and SVG to RCA, occluded SVG to 1st diagonal (likely chronic)     Osteoporosis      Other and unspecified hyperlipidemia     statin intolerance     Postsurgical aortocoronary bypass status 2004    Nation-Lad, VG-RI, VG-RCA (cath 2011 grafts open, only potential ischemia prox Lad before [place]ima)     Stented coronary artery     prior to CAB: Lad, RI, brachyRx     Type II or unspecified type diabetes mellitus without mention of complication, not stated as uncontrolled      Unspecified hearing loss        PAST SURGICAL HISTORY:  Past Surgical History:   Procedure Laterality Date     APPENDECTOMY  1970     bcg treatment  2011-12    12 total     C CABG, ARTERIAL, THREE  2004    triple lad, ri, rca     CAROTID ENDARTERECTOMY  2009    right side     CHOLECYSTECTOMY, OPEN  1987     COLONOSCOPY N/A 2/27/2015    Procedure: COMBINED COLONOSCOPY, SINGLE OR MULTIPLE BIOPSY/POLYPECTOMY BY BIOPSY;  Surgeon: Kenroy Licona MD;  Location:  GI     COLONOSCOPY N/A 10/28/2016    Procedure: COMBINED COLONOSCOPY, SINGLE OR MULTIPLE BIOPSY/POLYPECTOMY BY BIOPSY;  Surgeon: Kenroy Licona MD;  Location:  GI     CORONARY ANGIOGRAPHY ADULT ORDER  11/21/2011    all grafts open (only poss area of ischemia is Lad origin closed and Liima to Lad ok but small prox section filled via diag graft retro)     CORONARY ANGIOGRAPHY ADULT ORDER  1/28/2004     CORONARY ANGIOGRAPHY ADULT ORDER  9/2015    severe native vessel CAD, patent LIMA to LAD and SVG to RCA, occluded SVG to 1st diagonal (likely chronic     CORONARY ARTERY BYPASS  1/2004     CABG, Nation-Lad, Vg-RI, VG-RCA (due to diffuse dz, rec life long plavix)     CORONARY ARTERY BYPASS  2004    LIMA=LAD, SVG=Ramus, SVG=Right     CYSTOSCOPY, BIOPSY BLADDER, COMBINED  3/28/2013    Procedure: COMBINED CYSTOSCOPY, BIOPSY BLADDER;  CYSTOSCOPY, BLADDER BIOPSY, FULGURATION ;  Surgeon: Rafat Pride MD;   Location: [facility] OR     CYSTOSCOPY, FULGURATE BLADDER TUMOR, COMBINED  3/28/2013    Procedure: COMBINED CYSTOSCOPY, FULGURATE BLADDER TUMOR;;  Surgeon: Rafat Pride MD;  Location: [facility] OR     ENDARTERECTOMY CAROTID   R CEA 2009     eyelid surgery  2009     HC CORONARY STENT PERCUT, EA ADDTL VESSEL      6 total, last      HC REMOVAL ANAL FISTULA,SUBCUTANEOUS       HC REPAIR OF NASAL SEPTUM       HEART CATH, ANGIOPLASTY  2003     BrachyTx RI (?new stent also)     HEART CATH, ANGIOPLASTY  2003     Stent RI, GEORGE stent distal LAD (60%RCA)     HEART CATH, ANGIOPLASTY  2003    PTCA-RI     HEART CATH, ANGIOPLASTY      LAD Stent     HERNIA REPAIR, INGUINAL RT/LT      and      SURGICAL HISTORY OF -       wrist surgery     SURGICAL HISTORY OF -   3/2/2011    Transurethral resection of bladder tumors     TONSILLECTOMY       TURP         FAMILY HISTORY:  Family History   Problem Relation Age of Onset     Heart Failure Mother      Coronary Artery Disease Mother       age 101 congestive heart attack     Hypertension Mother      Hyperlipidemia Mother      Thyroid Disease Mother      Myocardial Infarction Father      Coronary Artery Disease Father       age 83 heart attack     Hypertension Father      Hyperlipidemia Father      Prostate Cancer Father      Anesthesia Reaction Father      DIABETES Brother       age 51 drugs/alcohol     CEREBROVASCULAR DISEASE No family hx of        SOCIAL HISTORY:  Social History     Social History     Marital status:      Spouse name: maicol      Number of children: N/A     Years of education: 18     Occupational History            retired     Social History Main Topics     Smoking status: Never Smoker     Smokeless tobacco: Never Used     Alcohol use No     Drug use: No     Sexual activity: No     Other Topics Concern     Caffeine Concern Yes     occasional coffee or a diet coke     Sleep Concern No     Stress Concern Yes     related to  "health concerns     Weight Concern No     Special Diet No     Exercise Yes     not much     Seat Belt Yes     Parent/Sibling W/ Cabg, Mi Or Angioplasty Before 65f 55m? No     father, mother, brother all now      Social History Narrative    Patient live independently with his spouse. He is a retired .       Review of Systems:  Skin:  Positive for bruising     Eyes:  Negative      ENT:  Positive for hearing loss wears hearing aides   Respiratory:  Negative       Cardiovascular:    Positive for;chest pain;dizziness;edema;heaviness;lower extremity symptoms;palpitations;lightheadedness;fatigue dizzy, weak and neck pain  Gastroenterology: Positive for poor appetite;constipation    Genitourinary:  Positive for prostate problem;nocturia    Musculoskeletal:  Positive for arthritis    Neurologic:  Positive for headaches;numbness or tingling of feet    Psychiatric:  Positive for excessive stress;depression;sleep disturbances    Heme/Lymph/Imm:  Positive for easy bruising;allergies    Endocrine:  Positive for thyroid disorder;diabetes      Physical Exam:  Vitals: /72  Pulse 72  Ht 1.727 m (5' 8\")  Wt 68.5 kg (151 lb)  BMI 22.96 kg/m2    Constitutional:  cooperative, alert and oriented, well developed, well nourished, in no acute distress        Skin:  warm and dry to the touch, no apparent skin lesions or masses noted        Head:  normocephalic, no masses or lesions        Eyes:  pupils equal and round, conjunctivae and lids unremarkable, sclera white, no xanthalasma, EOMS intact, no nystagmus        ENT:  no pallor or cyanosis, dentition good        Neck:  carotid pulses are full and equal bilaterally, JVP normal, no carotid bruit, no thyromegaly   RCEA well healed    Chest:  normal breath sounds, clear to auscultation, normal A-P diameter, normal symmetry, normal respiratory excursion, no use of accessory muscles     surgical scar    Cardiac: regular rhythm;normal S1 and S2       grade " 1;systolic ejection murmur          Abdomen:  abdomen soft;BS normoactive        Vascular: pulses full and equal, no bruits auscultated                                        Extremities and Back:  no deformities, clubbing, cyanosis, erythema observed;no edema              Neurological:  affect appropriate, oriented to time, person and place;no gross motor deficits              CC  Choco Carr MD  [id number]  [place] [id number]

## 2017-09-05 NOTE — TELEPHONE ENCOUNTER
Component      Latest Ref Rng & Units 9/1/2017   Cholesterol      <200 mg/dL 158   Triglycerides      <150 mg/dL 233 (H)   HDL Cholesterol      >39 mg/dL 56   LDL Cholesterol Calculated      <100 mg/dL 55   Non HDL Cholesterol      <130 mg/dL 102     Component      Latest Ref Rng & Units 9/1/2017   Sodium      133 - 144 mmol/L 137   Potassium      3.4 - 5.3 mmol/L 5.0   Chloride      94 - 109 mmol/L 102   Carbon Dioxide      20 - 32 mmol/L 26   Anion Gap      3 - 14 mmol/L 9   Glucose      70 - 99 mg/dL 186 (H)   Urea Nitrogen      7 - 30 mg/dL 24   Creatinine      0.66 - 1.25 mg/dL 1.24   GFR Estimate      >60 mL/min/1.7m2 55 (L)   GFR Estimate If Black      >60 mL/min/1.7m2 67   Calcium      8.5 - 10.1 mg/dL 9.3   Called Pt spoke with wife. Did review labs with her. Potassium 5.0 and Pt eating a lot of banana's. Pt will cut back on banana's and other high potassium foods. Otherwise Pt doing well was just seen by JESSICA Wu. KEREN Rivas rN

## 2017-10-11 NOTE — TELEPHONE ENCOUNTER
DULoxetine (CYMBALTA) 20 MG EC capsule    Last Written Prescription Date: 3/2/17  Last Fill Quantity: 180, # refills: 1  Last Office Visit with [facility], [facility] or [facility] prescribing provider: 8/2/17        BP Readings from Last 3 Encounters:   08/16/17 136/72   08/02/17 103/57   06/06/17 108/51     Pulse: (for Fetzima)  Creatinine   Date Value Ref Range Status   09/01/2017 1.24 0.66 - 1.25 mg/dL Final   ]    Last PHQ-9 score on record=   PHQ-9 SCORE 8/2/2017   Total Score -   Total Score 15

## 2017-10-12 NOTE — TELEPHONE ENCOUNTER
Routing refill request to provider for review/approval because:  Drug not on the [facility] refill protocol for chronic pain dx

## 2017-11-01 NOTE — PROGRESS NOTES
[facility] CANCER CLINIC    NEW PATIENT VISIT NOTE    PATIENT NAME: Bora Mtezger MRN # [id number]  DATE OF VISIT: November 1, 2017 YOB: 1931    REFERRING PROVIDER: Referred Self, MD  No address on file    CANCER TYPE:  STAGE:      TREATMENT SUMMARY:    CURRENT INTERVENTIONS:     HISTORY OF PRESENT ILLNESS       Hunter is here with his wife. He had lower urinary tract obstructive symptoms in 2001. He had PSA evaluation was done which was normal. He had a TURP on 5/23/01 for his BPH which revealed Ernestine 5+5 prostate cancer. He was treated with definitive radiation therapy from 7/19-9/14/01. Since then he had been treated for non-muscle invasive bladder cancer. He had BCG treatments - 6 weekly induction from 4/7-5/12/11 and then again 2/22-3/28/12. Since then he has had several cystoscopies without any evidence for recurrent disease. At most recent cystoscopy about 2 weeks ago several different polyps were noted in the bladder. Dr. Pride advised against any further procedures and favored observation with follow up in 9 months. His wife is very unhappy with the decision and wishes to seek second opinion.     He had blood in urine initially at presentation in 2001 but denies any subsequent hematuria. He has pain while sitting and feels that his prostate does not like to be sat on. He has urinary incontinence. He has pad. If he walks around he leaks in to the pad. He wakes up to urinate 3-4 times at night and still wakes up with a full pad. He goes through 3 pads in a day.     He has poor appetite. His weight has varied between 150 - 157 lbs in recent past. He has dropped from pant size 42 to current size of 34. He has lost weight from 200 lbs to currently around 150 lbs over last 5+ years. He loves [place] chocolate.     He has been through several colonoscopies which have not show any cancer and only occasional polyps that were removed.     He tries to stay active as long as his  sinuses are not acting up. Quick turn or quick movements makes him lose balance, but he does not fall. His pain in prostate is his biggest problem. It has been present since .     He has erectile dysfunction.     He fell of ladder and ripped his left hand and left shoulder. He had concussion. He is always sore in his left shoulder. He has been told that he has OA and DJD up and down his spine and across shoulder. He has a hard time determining the     He had 3 v CABG and 5 stents. He was very sick after CABG and was admitted for 2 weeks. He had right carotidendareterectomy. He has 82% obstruction in his left carotid and has has been told to have a CEA once he has 85% obstruction. He does get CP with activity and cannot do long walks. He has been doing repatha to lower his cholesterol. He had a great response to repatha.     He has anxiety and depression. On some occasions he is depressed and would be happy to be dead. His mother lived to 101 yrs. Father  at age of 83 - had HTN, bladder cancer and  of massive stroke.        PAST MEDICAL HISTORY     Past Medical History:   Diagnosis Date     Acute myocardial infarction, unspecified site, episode of care unspecified      Anxiety      Arrhythmia     PAC, PVC     Arthritis      Atrial fibrillation (H)      Bilateral claudication of lower limb (H)      Bladder cancer (H)      Carotid artery disease (H)     right carotid endarterectomy      Chronic pain syndrome      Coronary artery disease     CVS rec lifelong plavix due to diffuse CAD     ED (erectile dysfunction)     since surgery and XRT     Epididymitis      Essential hypertension, benign      Herpes zoster without mention of complication 2007     History of radiation therapy     prostate,  (37 treatments)     Hypothyroidism      Malignant neoplasm of prostate (H)      NSTEMI (non-ST elevated myocardial infarction) (H) 2015 Heart cath - severe native vessel CAD, patent LIMA to  LAD and SVG to RCA, occluded SVG to 1st diagonal (likely chronic)     Osteoporosis      Other and unspecified hyperlipidemia     statin intolerance     Postsurgical aortocoronary bypass status 2004    [place]ad, VG-RI, VG-RCA (cath 2011 grafts open, only potential ischemia prox Lad before [place])     Stented coronary artery     prior to CAB: Lad, RI, brachyRx     Type II or unspecified type diabetes mellitus without mention of complication, not stated as uncontrolled      Unspecified hearing loss           CURRENT OUTPATIENT MEDICATIONS     Current Outpatient Prescriptions   Medication Sig     DULoxetine (CYMBALTA) 20 MG EC capsule TAKE ONE CAPSULE BY MOUTH TWICE DAILY     HYDROcodone-acetaminophen (NORCO) 5-325 MG per tablet Take 1 tablet by mouth every 6 hours as needed for pain #90 for three months supply.  Do not take with tramadol. (Patient only takes when tramadol does not work)     traMADol-acetaminophen (ULTRACET) 37.5-325 MG per tablet Two every morning and two at dinnertime as needed , discussed/knows not to use with vicodin since both had acetaminophen and would be over-sedating.     azithromycin (ZITHROMAX) 250 MG tablet Two tablets first day, then one tablet daily for four days.     metFORMIN (GLUCOPHAGE-XR) 500 MG 24 hr tablet Take 2 tablets (1,000 mg) by mouth 2 times daily (with meals)     LORazepam (ATIVAN) 0.5 MG tablet Take 1-2 tablets (0.5-1 mg) by mouth nightly as needed Take 1-2 tabs by mouth as needed for anxiety and or sleeping-     tiZANidine (ZANAFLEX) 2 MG tablet Take 0.5-1 tablets (1-2 mg) by mouth 2 times daily as needed for muscle spasms     isosorbide mononitrate (IMDUR) 30 MG 24 hr tablet 1-2 tabs daily     metoprolol (LOPRESSOR) 50 MG tablet Take 1 tablet (50 mg) by mouth 2 times daily     levothyroxine (SYNTHROID/LEVOTHROID) 25 MCG tablet Take 1 tablet (25 mcg) by mouth daily     evolocumab (REPATHA) 140 MG/ML prefilled autoinjector Inject 1 mL (140 mg) Subcutaneous every 14 days      "nitroglycerin (NITRODUR) 0.4 MG/HR 24 hr patch Place 1 patch onto the skin daily     lidocaine (LIDODERM) 5 % patch Place 1 patch onto the skin as needed Over painful areas     lisinopril (PRINIVIL,ZESTRIL) 2.5 MG tablet Take 1 tablet (2.5 mg) by mouth every evening     nitroglycerin (NITROSTAT) 0.4 MG SL tablet 1 TAB EVERY 5 MIN AS NEEDED, UP TO 3 PER EPISODE - Pt may  4 bottles of 25 at a time     clopidogrel (PLAVIX) 75 MG tablet Take 1 tablet (75 mg) by mouth daily     lidocaine (XYLOCAINE) 5 % ointment Apply topically as needed for moderate pain     blood glucose monitoring (ACCU-CHEK SMILEY PLUS) test strip Use to test blood sugar 1 times daily or as directed.     blood glucose monitoring (SOFTCLIX) lancets Use to test blood sugar 1 times daily or as directed.     levocetirizine (XYZAL) 5 MG tablet Take 1 tablet (5 mg) by mouth every evening     hydrocortisone (ANUSOL-HC) 2.5 % rectal cream Place rectally 2 times daily as needed for hemorrhoids Do not apply for more that two weeks at a time     COENZYME Q-10 PO Take 1 tablet by mouth daily     aspirin 81 MG tablet Take 1 tablet (81 mg) by mouth daily     blood glucose monitoring (ACCU-CHEK SMILEY PLUS) meter device kit Use to test blood sugar 1 times daily or as directed.     glucose blood VI test strips strip 1 strip by In Vitro route daily Comfort curve test stripes     multivitamin, therapeutic with minerals (MULTI-VITAMIN) TABS Take 1 tablet by mouth daily     No current facility-administered medications for this visit.         ALLERGIES      Allergies   Allergen Reactions     Insulin Anaphylaxis     Had this in hospital.  Unsure of which insulin.   \"went crazy\"    Caused shock.     Amlodipine Besylate      Heart pounds uncontrollably      Contrast Dye      Developed itchy rash .     Crestor [Rosuvastatin Calcium]      Severe muscle pains     Gabapentin Diarrhea     Hmg-Coa-R Inhibitors      Levalo-- reaction, ended up in the hospital     Livalo " [Pitavastatin]      After 7 days --severe muscle pains, joint pains.     Naprosyn [Naproxen]      Stomach disorder     Penicillins      rash     Welchol [Colesevelam] GI Disturbance     Ineffective on cholesterol     Zetia [Ezetimibe] Cramps     Ineffective on cholesterol        SOCIAL HISTORY   He is  and lives with his wife. He is a non - smoker, denies recreational drug use.     He was a . He was also part time .      FAMILY HISTORY     - Mother had macular degeneration;  of CHF  - Father had BLADDER Cancer and HTN.  - His brother committed suicide     REVIEW OF SYSTEMS   As above in the HPI, o/w complete 12-point ROS was negative.  Answers for HPI/ROS submitted by the patient on 10/25/2017   General Symptoms: No  Skin Symptoms: No  HENT Symptoms: Yes  EYE SYMPTOMS: No  HEART SYMPTOMS: No  LUNG SYMPTOMS: No  INTESTINAL SYMPTOMS: No  URINARY SYMPTOMS: Yes  REPRODUCTIVE SYMPTOMS: No  SKELETAL SYMPTOMS: Yes  BLOOD SYMPTOMS: No  NERVOUS SYSTEM SYMPTOMS: No  MENTAL HEALTH SYMPTOMS: No  Ear pain: No  Ear discharge: Yes  Hearing loss: Yes  Tinnitus: Yes  Nosebleeds: No  Congestion: No  Sinus pain: No  Trouble swallowing: No   Voice hoarseness: No  Mouth sores: No  Sore throat: No  Tooth pain: No  Gum tenderness: No  Bleeding gums: No  Change in taste: No  Change in sense of smell: No  Dry mouth: No  Hearing aid used: Yes  Neck lump: Yes  Trouble holding urine or incontinence: Yes  Pain or burning: No  Trouble starting or stopping: Yes  Increased frequency of urination: Yes  Blood in urine: No  Decreased frequency of urination: No  Frequent nighttime urination: Yes  Flank pain: No  Difficulty emptying bladder: Yes  Back pain: Yes  Muscle aches: Yes  Neck pain: Yes  Swollen joints: Yes  Joint pain: Yes  Bone pain: Yes  Muscle cramps: Yes  Muscle weakness: Yes  Joint stiffness: Yes  Bone fracture: No       PHYSICAL EXAM   /69  Pulse 101  Temp 96.8  F (36  C)  "(Tympanic)  Resp 15  Ht 1.727 m (5' 7.99\")  Wt 71.2 kg (157 lb)  SpO2 99%  BMI 23.88 kg/m2   SpO2 Readings from Last 4 Encounters:   08/02/17 97%   06/01/17 97%   03/02/17 97%   02/11/17 96%     Wt Readings from Last 3 Encounters:   08/16/17 68.5 kg (151 lb)   08/02/17 66.7 kg (147 lb)   06/06/17 72.1 kg (159 lb)     GEN: NAD  HEENT: PERRL, EOMI, no icterus, injection or pallor. Oropharynx is clear.  NECK: no cervical or supraclavicular lymphadenopathy  LUNGS: clear bilaterally  CV: regular, no murmurs, rubs, or gallops  ABDOMEN: soft, non-tender, non-distended, normal bowel sounds, no hepatosplenomegaly by percussion or palpation  EXT: warm, well perfused, no edema  NEURO: alert  SKIN: no rashes     LABORATORY AND IMAGING STUDIES     Recent Labs   Lab Test  09/01/17   1315  03/02/17   1044  02/27/16   1310  01/07/16   1305    NA  137  137  140  135*    POTASSIUM  5.0  4.3  4.4  4.9    CHLORIDE  102  102  105  98    CO2  26  29  28  28    ANIONGAP  9  6  7  5.9*    BUN  24  23  14  24    CR  1.24  1.36*  1.00  1.35*    GLC  186*  102*  88  205*    YUAN  9.3  9.3  8.7  9.3        Recent Labs   Lab Test  02/11/17   1049  02/27/16   1310  09/22/15   0533  09/21/15   1823  09/21/15   1350   03/16/14   1547  08/02/13   1713   WBC  16.1*  9.8  7.8  8.8  10.2   < >  11.8*  3.2*   HGB  12.1*  12.3*  11.2*  12.2*  12.3*   < >  15.3  13.6   PLT  270  288  234  264  280   < >  254  199   MCV  90  88  88  88  88   < >  89  89   NEUTROPHIL  70.5  65.5   --    --   70.4   --   80.5  22.3    < > = values in this interval not displayed.     Recent Labs   Lab Test  09/01/17   1315  03/02/17   1044  04/08/16   1202  09/21/15   1350  03/16/14   1547   BILITOTAL   --   0.3   --   0.3  0.7   ALKPHOS   --   92   --   95  93   ALT  25  20  <5*  22  23   AST   --   14   --   22  25   ALBUMIN   --   3.7   --   3.3*  3.9     TSH   Date Value Ref Range Status   03/02/2017 1.59 0.40 - 4.00 mU/L Final   04/10/2015 5.73 (H) 0.40 - 4.00 mU/L " Final   03/16/2014 1.52 0.4 - 5.0 mU/L Final     No results for input(s): CEA in the last 95639 hours.  Results for orders placed or performed during the hospital encounter of 06/06/17   US Carotid Bilateral    Narrative    US CAROTID BILATERAL 6/6/2017 1:46 PM    HISTORY: 86-year-old male status post right carotid endarterectomy on  3/12/2009.    COMPARISON: Carotid ultrasound dated 6/9/2015    FINDINGS:     Right side:   On the grayscale images, scattered plaque is identified in the distal  common carotid artery extending into the internal carotid artery.  Spectral waveform analysis was performed. Peak systolic and diastolic  velocities in the right internal carotid artery are 104 and 31 cm/s  versus 95 and 26 cm/s on the prior exam. Per sonographic criteria,  degree of stenosis in the right internal carotid artery is 16-49%.  Flow in the right vertebral artery is antegrade.     Left side:   On the grayscale images, shadowing plaque is identified at the carotid  bifurcation extending into the internal and external carotid arteries.  Shadowing from the plaque obscures portions of the internal carotid  artery.  Spectral waveform analysis was performed. Peak systolic and diastolic   velocities in the left internal carotid artery are 245 and 65 cm/s  versus 173 and 36 cm/s on the prior exam. Per sonographic criteria,  degree of stenosis in the left internal carotid artery is 50-79%.  Flow in the left vertebral artery is antegrade.       Impression    IMPRESSION: Per sonographic criteria, there is a 16-49% stenosis in  the right internal carotid artery and a 50-79% stenosis in the left  internal carotid artery.    Degree of stenosis measured relative to the diameter of the normal  internal carotid artery per NASCET or NASCET type criteria    SRUTHI FORDE MD       Lab Results   Component Value Date    PATH  10/28/2016     Patient Name: WAQAR SLAUGHTER  MR#: [id number]  Specimen #: [id number]  Collected:  10/28/2016  Received: 10/29/2016  Reported: 10/31/2016 13:57  Ordering Phy(s): OBI ALCANTAR  Additional Phy(s): WAQAR SANDERS    SPECIMEN(S):  Cecal polyp    FINAL DIAGNOSIS:  Large intestine, cecum, polypectomy  - Tubular adenoma  - No evidence of high-grade dysplasia or invasive malignancy    Electronically signed out by:    Raul Renee M.D.    CLINICAL HISTORY:  History of polyps    GROSS:  Received in formalin and labeled cecal polyp are 3 tissue fragments  measuring 0.4, 0.3 and 0.1 cm respectively.  Totally embedded, 1  cassette (Dictated by: Ki Newell MD 10/29/2016 11:22 AM)    MICROSCOPIC:  A formal microscopic examination was performed.    CPT Codes:  A: 25482-XC7    TESTING LAB LOCATION:  [address]  [address]  [phone]    COLLECTION SITE:  Client: [facility]  Location: [facility] ([facility])         Results for orders placed or performed during the hospital encounter of 06/06/17   US Carotid Bilateral    Narrative    US CAROTID BILATERAL 6/6/2017 1:46 PM    HISTORY: 86-year-old male status post right carotid endarterectomy on  3/12/2009.    COMPARISON: Carotid ultrasound dated 6/9/2015    FINDINGS:     Right side:   On the grayscale images, scattered plaque is identified in the distal  common carotid artery extending into the internal carotid artery.  Spectral waveform analysis was performed. Peak systolic and diastolic  velocities in the right internal carotid artery are 104 and 31 cm/s  versus 95 and 26 cm/s on the prior exam. Per sonographic criteria,  degree of stenosis in the right internal carotid artery is 16-49%.  Flow in the right vertebral artery is antegrade.     Left side:   On the grayscale images, shadowing plaque is identified at the carotid  bifurcation extending into the internal and external carotid arteries.  Shadowing from the plaque obscures portions of the internal carotid  artery.  Spectral waveform  analysis was performed. Peak systolic and diastolic   velocities in the left internal carotid artery are 245 and 65 cm/s  versus 173 and 36 cm/s on the prior exam. Per sonographic criteria,  degree of stenosis in the left internal carotid artery is 50-79%.  Flow in the left vertebral artery is antegrade.       Impression    IMPRESSION: Per sonographic criteria, there is a 16-49% stenosis in  the right internal carotid artery and a 50-79% stenosis in the left  internal carotid artery.    Degree of stenosis measured relative to the diameter of the normal  internal carotid artery per NASCET or NASCET type criteria    SRUTHI FORDE MD           ASSESSMENT    1. Recurrent non muscle invasive bladder cancer s/p BCG inductions in 2011 and 2012  2. ECOG PS 1  3. Multiple medical comorbidities including previous NSTEMI/coronary artery disease s/p CABG and PTCA with stents, HTN, DM TII  4. Prostate cancer treated with definitive radiation therapy in 2001    DISCUSSION   Bora comes with his wife at this clinic visit.  I had a lengthy discussion with them regarding the management of what appears to be non-muscle-invasive, high-grade, recurrent urothelial carcinoma.  He did initially present in 2001 and has had serial transurethral resections for his bladder tumor.  He also underwent what appears to be induction BCG treatments in 04/2011 and then again from 02/2012 through 03/2012.  He has had serial cystoscopies and resection of tumor since then.  Non-muscle-invasive bladder cancer is typically managed by a urologist with serial complete resections followed by intravesical instillation of BCG or other chemotherapeutics.  There is an advantage of continuing with maintenance BCG to keep the disease in check.  I am not sure if he has ever had BCG maintenance therapy done.  As a first step, I would like him to follow up with one of our urologists, either Dr. Argueta or Dr. Pawel Higuera, for an initial assessment of his  "disease.  I will review his case at our multispecialty urology tumor board to come up with a definitive plan for his management.      We do have a clinical trial for patients who are unresponsive to BCG therapy or have recurrent failures.  This is a phase II study with a PD-1 (programmed cell death receptor 1) monoclonal antibody, pembrolizumab.  This medication is administered intravenously every 3 weeks and has been proven to be effective in patients with recurrent metastatic disease.  Its role in non-muscle-invasive bladder cancer is currently being studied.  He would be a candidate for this study if it is determined that any additional BCG therapy would not be helpful for him.       His wife was quite disappointed after discussion with Dr. Rafat Pride about 2 weeks ago.  He has advised no further therapy for his recurrent bladder cancer.  Clearly they want to address his disease.        PLAN   1. Refer him to Dr. Argueta or Dr. Higuera in Urology for evaluation of recurrent bladder cancer; likely non-muscle invasive  2. He would be a candidate for \"Phase II Clinical Trial to Study the Efficacy and Safety of Pembrolizumab ([id number]) in Subjects with High Risk Non-muscle Invasive Bladder Cancer (NMIBC) Unresponsive to Bacillus Calmette-Rony (BCG) Therapy\"  3. I will see him after his consultation in urology and discussion in urology tumor board.     Over 60 min of direct face to face time spent with patient with more than 50% time spent in counseling and coordinating care.          Mika Duffy ,  Division of Hematology, Oncology & Transplantation  [facility].     "

## 2017-11-01 NOTE — MR AVS SNAPSHOT
After Visit Summary   11/1/2017    Bora Metzger    MRN: [id number]           Patient Information     Date Of Birth          1/15/1931        Visit Information        Provider Department      11/1/2017 3:15 PM Mika Sin MD [facility]        Today's Diagnoses     Malignant neoplasm of overlapping sites of bladder (H)    -  1    History of prostate cancer        History of MI (myocardial infarction)           Follow-ups after your visit        Additional Services     UROLOGY ADULT REFERRAL       Your provider has referred you to: [facility] [place] [facility] - [place] [phone]   [email or web address]    Please be aware that coverage of these services is subject to the terms and limitations of your health insurance plan.  Call member services at your health plan with any benefit or coverage questions.      Please bring the following with you to your appointment:    (1) Any X-Rays, CTs or MRIs which have been performed.  Contact the facility where they were done to arrange for  prior to your scheduled appointment.    (2) List of current medications  (3) This referral request   (4) Any documents/labs given to you for this referral                  Your next 10 appointments already scheduled     Nov 28, 2017  9:30 AM CST   (Arrive by 9:15 AM)   PAC EVALUATION with Leo Pac Cherise 2   [facility] Preoperative [facility] [place] ([facility])    [address] [place] [id number] [phone]            Nov 28, 2017 10:30 AM CST   (Arrive by 10:15 AM)   PAC RN ASSESSMENT with Leo Pac Rn   [facility]operative [facility] [place] ([facility])    [address] [place] [address]   [phone]            Nov 28, 2017 11:10 AM CST   (Arrive by 10:55 AM)   PAC Anesthesia Consult with Uc Pac  Anesthesiologist   [facility] Preoperative [facility] ([facility] [place])    [address]  4th Floor  [facility] [id number] [phone]            Dec 08, 2017   Procedure with Blanca Argueta MD   [facility] [place], Same Day Surgery (--)    [address]  [place] [address]   [phone]            Dec 20, 2017 10:00 AM CST   (Arrive by 9:45 AM)   Post-Op with Blanca Argueta MD   [facility]state [facility] Urologic C[facility] ([facility])    [address]  4th [facility] [id number] [phone]              Who to contact     If you have questions or need follow up information about today's clinic visit or your schedule please contact [facility] directly at [phone].  Normal or non-critical lab and imaging results will be communicated to you by MyChart, letter or phone within 4 business days after the clinic has received the results. If you do not hear from us within 7 days, please contact the clinic through [employer] or phone. If you have a critical or abnormal lab result, we will notify you by phone as soon as possible.  Submit refill requests through [facility] [facility] or call your pharmacy and they will forward the refill request to us. Please allow 3 business days for your refill to be completed.          Additional Information About Your Visit        ezeephart Information     [facility] [facility] gives you secure access to your electronic health record. If you see a primary care provider, you can also send messages to your care team and make appointments. If you have questions, please call your primary care clinic.  If you do not have a primary care provider, please call [phone] and they will assist you.        Care EveryWhere ID     This is your Care EveryWhere ID. This could be used by other organizations to access your [place] medical records  [id number]        Your Vitals Were     Pulse Temperature  "Respirations Height Pulse Oximetry BMI (Body Mass Index)    101 96.8  F (36  C) (Tympanic) 15 1.727 m (5' 7.99\") 99% 23.88 kg/m2       Blood Pressure from Last 3 Encounters:   11/16/17 147/83   11/01/17 114/69   08/16/17 136/72    Weight from Last 3 Encounters:   11/16/17 70.5 kg (155 lb 6.4 oz)   11/01/17 71.2 kg (157 lb)   08/16/17 68.5 kg (151 lb)              We Performed the Following     UROLOGY ADULT REFERRAL        Primary Care Provider Office Phone # Fax #    Bora Hardwick [phone] [address] [facility] [address]        Equal Access to Services     RAHEEM ARTHUR : Hadii aad ku hadasho Soomaali, waaxda luqadaha, qaybta kaalmada adeegyada, kalyan alexandra . So [facility] [phone].    ATENCIÓN: Si habla español, tiene a saunders disposición servicios gratuitos de asistencia lingüística. Llame al [phone].    We comply with applicable federal civil rights laws and [place] laws. We do not discriminate on the basis of race, color, national origin, age, disability, sex, sexual orientation, or gender identity.            Thank you!     Thank you for choosing [facility]  for your care. Our goal is always to provide you with excellent care. Hearing back from our patients is one way we can continue to improve our services. Please take a few minutes to complete the written survey that you may receive in the mail after your visit with us. Thank you!             Your Updated Medication List - Protect others around you: Learn how to safely use, store and throw away your medicines at www.disposemymeds.org.          This list is accurate as of: 11/1/17 11:59 PM.  Always use your most recent med list.                   Brand Name Dispense Instructions for use Diagnosis    * ASPIRIN 81 PO      Take by mouth daily    Malignant neoplasm of overlapping sites of bladder (H), History of prostate cancer, History of MI (myocardial infarction)       * aspirin 81 " MG tablet     100 tablet    Take 1 tablet (81 mg) by mouth daily    CAD (coronary artery disease)       blood glucose monitoring lancets     1 Box    Use to test blood sugar 1 times daily or as directed.    Type 2 diabetes, HbA1C goal < 8% (H)       blood glucose monitoring meter device kit     1 kit    Use to test blood sugar 1 times daily or as directed.    Type 2 diabetes, HbA1C goal < 8% (H)       * blood glucose monitoring test strip    no brand specified    100 each    1 strip by In Vitro route daily Comfort curve test stripes    Type 2 diabetes, HbA1C goal < 8% (H)       * blood glucose monitoring test strip    ACCU-CHEK SMILEY PLUS    100 each    Use to test blood sugar 1 times daily or as directed.    Type 2 diabetes, HbA1C goal < 8% (H)       clopidogrel 75 MG tablet    PLAVIX    90 tablet    Take 1 tablet (75 mg) by mouth daily    Coronary artery disease involving native heart, angina presence unspecified, unspecified vessel or lesion type       COENZYME Q-10 PO      Take 1 tablet by mouth daily        doxycycline monohydrate 100 MG capsule       Malignant neoplasm of overlapping sites of bladder (H), History of prostate cancer, History of MI (myocardial infarction)       DULoxetine 20 MG EC capsule    CYMBALTA    180 capsule    TAKE ONE CAPSULE BY MOUTH TWICE DAILY    Chronic pain syndrome, Type 2 diabetes mellitus with diabetic polyneuropathy, without long-term current use of insulin (H), Bilateral claudication of lower limb (H)       evolocumab 140 MG/ML prefilled autoinjector    REPATHA    2 each    Inject 1 mL (140 mg) Subcutaneous every 14 days    Statin intolerance       HYDROcodone-acetaminophen 5-325 MG per tablet    NORCO    90 tablet    Take 1 tablet by mouth every 6 hours as needed for pain #90 for three months supply.  Do not take with tramadol. (Patient only takes when tramadol does not work)    DDD (degenerative disc disease), cervical, Chronic pain syndrome       hydrocortisone 2.5 % cream     ANUSOL-HC    30 g    Place rectally 2 times daily as needed for hemorrhoids Do not apply for more that two weeks at a time    Internal hemorrhoids       IMDUR 30 MG 24 hr tablet   Generic drug:  isosorbide mononitrate      1-2 tabs daily        levocetirizine 5 MG tablet    XYZAL    90 tablet    Take 1 tablet (5 mg) by mouth every evening    Seasonal allergies       levothyroxine 25 MCG tablet    SYNTHROID/LEVOTHROID    90 tablet    Take 1 tablet (25 mcg) by mouth daily    Subclinical hypothyroidism       * lidocaine 5 % ointment    XYLOCAINE    50 g    Apply topically as needed for moderate pain    Chronic pain syndrome       * lidocaine 5 % Patch    LIDODERM    30 patch    Place 1 patch onto the skin as needed Over painful areas    DDD (degenerative disc disease), cervical, DDD (degenerative disc disease), lumbar       lisinopril 2.5 MG tablet    PRINIVIL/Zestril    180 tablet    Take 1 tablet (2.5 mg) by mouth every evening    Essential hypertension with goal blood pressure less than 140/90       LORazepam 0.5 MG tablet    ATIVAN    60 tablet    Take 1-2 tablets (0.5-1 mg) by mouth nightly as needed Take 1-2 tabs by mouth as needed for anxiety and or sleeping-    Anxiety, Chronic pain syndrome       metFORMIN 500 MG 24 hr tablet    GLUCOPHAGE-XR    360 tablet    Take 2 tablets (1,000 mg) by mouth 2 times daily (with meals)    Type 2 diabetes mellitus without complication, without long-term current use of insulin (H)       metoprolol 50 MG tablet    LOPRESSOR    180 tablet    Take 1 tablet (50 mg) by mouth 2 times daily    Hypertension goal BP (blood pressure) < 140/90       Multi-vitamin Tabs tablet      Take 1 tablet by mouth daily        * nitroGLYcerin 0.4 MG sublingual tablet    NITROSTAT    100 tablet    1 TAB EVERY 5 MIN AS NEEDED, UP TO 3 PER EPISODE - Pt may  4 bottles of 25 at a time    Chronic ischemic heart disease, unspecified       * nitroGLYcerin 0.4 MG/HR 24 hr patch    NITRODUR    30  patch    Place 1 patch onto the skin daily    Chronic atrial fibrillation (H)       RESTASIS 0.05 % ophthalmic emulsion   Generic drug:  cycloSPORINE       Malignant neoplasm of overlapping sites of bladder (H), History of prostate cancer, History of MI (myocardial infarction)       tiZANidine 2 MG tablet    ZANAFLEX    30 tablet    Take 0.5-1 tablets (1-2 mg) by mouth 2 times daily as needed for muscle spasms    Chronic pain syndrome, Pain in joint, multiple sites       traMADol-acetaminophen 37.5-325 MG per tablet    ULTRACET    120 tablet    Two every morning and two at dinnertime as needed , discussed/knows not to use with vicodin since both had acetaminophen and would be over-sedating.    DDD (degenerative disc disease), cervical       * Notice:  This list has 8 medication(s) that are the same as other medications prescribed for you. Read the directions carefully, and ask your doctor or other care provider to review them with you.

## 2017-11-01 NOTE — NURSING NOTE
"Oncology Rooming Note    November 1, 2017 3:47 PM   Bora Metzger is a 86 year old male who presents for:    Chief Complaint   Patient presents with     Oncology Clinic Visit     New patient visit related to Hx of Bladder & Prostrate Cancer     Initial Vitals: /69  Pulse 101  Temp 96.8  F (36  C) (Tympanic)  Resp 15  Ht 1.727 m (5' 7.99\")  Wt 71.2 kg (157 lb)  SpO2 99%  BMI 23.88 kg/m2 Estimated body mass index is 23.88 kg/(m^2) as calculated from the following:    Height as of this encounter: 1.727 m (5' 7.99\").    Weight as of this encounter: 71.2 kg (157 lb). Body surface area is 1.85 meters squared.  Moderate Pain (4) Comment: Spine   No LMP for male patient.  Allergies reviewed: Yes  Medications reviewed: Yes    Medications: Medication refills not needed today.  Pharmacy name entered into [employer]: [facility] - [address]    Clinical concerns: No new concerns. Provider was notified.    10 minutes for nursing intake (face to face time)     Silvia Boss LPN            "

## 2017-11-01 NOTE — LETTER
11/1/2017       RE: Bora Metzger  [address]  [facility] [address]     Dear Colleague,    Thank you for referring your patient, Bora Metzger, to the [facility]. Please see a copy of my visit note below.    [facility]    NEW PATIENT VISIT NOTE    PATIENT NAME: Bora Metzger MRN # [id number]  DATE OF VISIT: November 1, 2017 YOB: 1931    REFERRING PROVIDER: Referred Self, MD  No address on file    CANCER TYPE:  STAGE:      TREATMENT SUMMARY:    CURRENT INTERVENTIONS:     HISTORY OF PRESENT ILLNESS       Hunter is here with his wife. He had lower urinary tract obstructive symptoms in 2001. He had PSA evaluation was done which was normal. He had a TURP on 5/23/01 for his BPH which revealed Ernestine 5+5 prostate cancer. He was treated with definitive radiation therapy from 7/19-9/14/01. Since then he had been treated for non-muscle invasive bladder cancer. He had BCG treatments - 6 weekly induction from 4/7-5/12/11 and then again 2/22-3/28/12. Since then he has had several cystoscopies without any evidence for recurrent disease. At most recent cystoscopy about 2 weeks ago several different polyps were noted in the bladder. Dr. Pride advised against any further procedures and favored observation with follow up in 9 months. His wife is very unhappy with the decision and wishes to seek second opinion.     He had blood in urine initially at presentation in 2001 but denies any subsequent hematuria. He has pain while sitting and feels that his prostate does not like to be sat on. He has urinary incontinence. He has pad. If he walks around he leaks in to the pad. He wakes up to urinate 3-4 times at night and still wakes up with a full pad. He goes through 3 pads in a day.     He has poor appetite. His weight has varied between 150 - 157 lbs in recent past. He has dropped from pant size 42 to current size of 34. He has lost weight from 200 lbs  to currently around 150 lbs over last 5+ years. He loves [place] chocolate.     He has been through several colonoscopies which have not show any cancer and only occasional polyps that were removed.     He tries to stay active as long as his sinuses are not acting up. Quick turn or quick movements makes him lose balance, but he does not fall. His pain in prostate is his biggest problem. It has been present since .     He has erectile dysfunction.     He fell of ladder and ripped his left hand and left shoulder. He had concussion. He is always sore in his left shoulder. He has been told that he has OA and DJD up and down his spine and across shoulder. He has a hard time determining the     He had 3 v CABG and 5 stents. He was very sick after CABG and was admitted for 2 weeks. He had right carotidendareterectomy. He has 82% obstruction in his left carotid and has has been told to have a CEA once he has 85% obstruction. He does get CP with activity and cannot do long walks. He has been doing repatha to lower his cholesterol. He had a great response to repatha.     He has anxiety and depression. On some occasions he is depressed and would be happy to be dead. His mother lived to 101 yrs. Father  at age of 83 - had HTN, bladder cancer and  of massive stroke.        PAST MEDICAL HISTORY     Past Medical History:   Diagnosis Date     Acute myocardial infarction, unspecified site, episode of care unspecified      Anxiety      Arrhythmia     PAC, PVC     Arthritis      Atrial fibrillation (H)      Bilateral claudication of lower limb (H)      Bladder cancer (H)      Carotid artery disease (H)     right carotid endarterectomy 2009     Chronic pain syndrome      Coronary artery disease     CVS rec lifelong plavix due to diffuse CAD     ED (erectile dysfunction)     since surgery and XRT     Epididymitis      Essential hypertension, benign      Herpes zoster without mention of complication 2007     History  of radiation therapy     prostate, 2001 (37 treatments)     Hypothyroidism      Malignant neoplasm of prostate (H)      NSTEMI (non-ST elevated myocardial infarction) (H) 9/2015 9/2015 Heart cath - severe native vessel CAD, patent LIMA to LAD and SVG to RCA, occluded SVG to 1st diagonal (likely chronic)     Osteoporosis      Other and unspecified hyperlipidemia     statin intolerance     Postsurgical aortocoronary bypass status 2004    [place]-Lad, VG-RI, VG-RCA (cath 2011 grafts open, only potential ischemia prox Lad before [place])     Stented coronary artery     prior to CAB: Lad, RI, brachyRx     Type II or unspecified type diabetes mellitus without mention of complication, not stated as uncontrolled      Unspecified hearing loss           CURRENT OUTPATIENT MEDICATIONS     Current Outpatient Prescriptions   Medication Sig     DULoxetine (CYMBALTA) 20 MG EC capsule TAKE ONE CAPSULE BY MOUTH TWICE DAILY     HYDROcodone-acetaminophen (NORCO) 5-325 MG per tablet Take 1 tablet by mouth every 6 hours as needed for pain #90 for three months supply.  Do not take with tramadol. (Patient only takes when tramadol does not work)     traMADol-acetaminophen (ULTRACET) 37.5-325 MG per tablet Two every morning and two at dinnertime as needed , discussed/knows not to use with vicodin since both had acetaminophen and would be over-sedating.     azithromycin (ZITHROMAX) 250 MG tablet Two tablets first day, then one tablet daily for four days.     metFORMIN (GLUCOPHAGE-XR) 500 MG 24 hr tablet Take 2 tablets (1,000 mg) by mouth 2 times daily (with meals)     LORazepam (ATIVAN) 0.5 MG tablet Take 1-2 tablets (0.5-1 mg) by mouth nightly as needed Take 1-2 tabs by mouth as needed for anxiety and or sleeping-     tiZANidine (ZANAFLEX) 2 MG tablet Take 0.5-1 tablets (1-2 mg) by mouth 2 times daily as needed for muscle spasms     isosorbide mononitrate (IMDUR) 30 MG 24 hr tablet 1-2 tabs daily     metoprolol (LOPRESSOR) 50 MG tablet Take 1  "tablet (50 mg) by mouth 2 times daily     levothyroxine (SYNTHROID/LEVOTHROID) 25 MCG tablet Take 1 tablet (25 mcg) by mouth daily     evolocumab (REPATHA) 140 MG/ML prefilled autoinjector Inject 1 mL (140 mg) Subcutaneous every 14 days     nitroglycerin (NITRODUR) 0.4 MG/HR 24 hr patch Place 1 patch onto the skin daily     lidocaine (LIDODERM) 5 % patch Place 1 patch onto the skin as needed Over painful areas     lisinopril (PRINIVIL,ZESTRIL) 2.5 MG tablet Take 1 tablet (2.5 mg) by mouth every evening     nitroglycerin (NITROSTAT) 0.4 MG SL tablet 1 TAB EVERY 5 MIN AS NEEDED, UP TO 3 PER EPISODE - Pt may  4 bottles of 25 at a time     clopidogrel (PLAVIX) 75 MG tablet Take 1 tablet (75 mg) by mouth daily     lidocaine (XYLOCAINE) 5 % ointment Apply topically as needed for moderate pain     blood glucose monitoring (ACCU-CHEK SMILEY PLUS) test strip Use to test blood sugar 1 times daily or as directed.     blood glucose monitoring (SOFTCLIX) lancets Use to test blood sugar 1 times daily or as directed.     levocetirizine (XYZAL) 5 MG tablet Take 1 tablet (5 mg) by mouth every evening     hydrocortisone (ANUSOL-HC) 2.5 % rectal cream Place rectally 2 times daily as needed for hemorrhoids Do not apply for more that two weeks at a time     COENZYME Q-10 PO Take 1 tablet by mouth daily     aspirin 81 MG tablet Take 1 tablet (81 mg) by mouth daily     blood glucose monitoring (ACCU-CHEK SMILEY PLUS) meter device kit Use to test blood sugar 1 times daily or as directed.     glucose blood VI test strips strip 1 strip by In Vitro route daily Comfort curve test stripes     multivitamin, therapeutic with minerals (MULTI-VITAMIN) TABS Take 1 tablet by mouth daily     No current facility-administered medications for this visit.         ALLERGIES      Allergies   Allergen Reactions     Insulin Anaphylaxis     Had this in hospital.  Unsure of which insulin.   \"went crazy\"    Caused shock.     Amlodipine Besylate      Heart " pounds uncontrollably      Contrast Dye      Developed itchy rash .     Crestor [Rosuvastatin Calcium]      Severe muscle pains     Gabapentin Diarrhea     Hmg-Coa-R Inhibitors      Levalo-- reaction, ended up in the hospital     Livalo [Pitavastatin]      After 7 days --severe muscle pains, joint pains.     Naprosyn [Naproxen]      Stomach disorder     Penicillins      rash     Welchol [Colesevelam] GI Disturbance     Ineffective on cholesterol     Zetia [Ezetimibe] Cramps     Ineffective on cholesterol        SOCIAL HISTORY   He is  and lives with his wife. He is a non - smoker, denies recreational drug use.     He was a . He was also part time .      FAMILY HISTORY     - Mother had macular degeneration;  of CHF  - Father had BLADDER Cancer and HTN.  - His brother committed suicide     REVIEW OF SYSTEMS   As above in the HPI, o/w complete 12-point ROS was negative.  Answers for HPI/ROS submitted by the patient on 10/25/2017   General Symptoms: No  Skin Symptoms: No  HENT Symptoms: Yes  EYE SYMPTOMS: No  HEART SYMPTOMS: No  LUNG SYMPTOMS: No  INTESTINAL SYMPTOMS: No  URINARY SYMPTOMS: Yes  REPRODUCTIVE SYMPTOMS: No  SKELETAL SYMPTOMS: Yes  BLOOD SYMPTOMS: No  NERVOUS SYSTEM SYMPTOMS: No  MENTAL HEALTH SYMPTOMS: No  Ear pain: No  Ear discharge: Yes  Hearing loss: Yes  Tinnitus: Yes  Nosebleeds: No  Congestion: No  Sinus pain: No  Trouble swallowing: No   Voice hoarseness: No  Mouth sores: No  Sore throat: No  Tooth pain: No  Gum tenderness: No  Bleeding gums: No  Change in taste: No  Change in sense of smell: No  Dry mouth: No  Hearing aid used: Yes  Neck lump: Yes  Trouble holding urine or incontinence: Yes  Pain or burning: No  Trouble starting or stopping: Yes  Increased frequency of urination: Yes  Blood in urine: No  Decreased frequency of urination: No  Frequent nighttime urination: Yes  Flank pain: No  Difficulty emptying bladder: Yes  Back pain: Yes  Muscle  "aches: Yes  Neck pain: Yes  Swollen joints: Yes  Joint pain: Yes  Bone pain: Yes  Muscle cramps: Yes  Muscle weakness: Yes  Joint stiffness: Yes  Bone fracture: No       PHYSICAL EXAM   /69  Pulse 101  Temp 96.8  F (36  C) (Tympanic)  Resp 15  Ht 1.727 m (5' 7.99\")  Wt 71.2 kg (157 lb)  SpO2 99%  BMI 23.88 kg/m2   SpO2 Readings from Last 4 Encounters:   08/02/17 97%   06/01/17 97%   03/02/17 97%   02/11/17 96%     Wt Readings from Last 3 Encounters:   08/16/17 68.5 kg (151 lb)   08/02/17 66.7 kg (147 lb)   06/06/17 72.1 kg (159 lb)     GEN: NAD  HEENT: PERRL, EOMI, no icterus, injection or pallor. Oropharynx is clear.  NECK: no cervical or supraclavicular lymphadenopathy  LUNGS: clear bilaterally  CV: regular, no murmurs, rubs, or gallops  ABDOMEN: soft, non-tender, non-distended, normal bowel sounds, no hepatosplenomegaly by percussion or palpation  EXT: warm, well perfused, no edema  NEURO: alert  SKIN: no rashes     LABORATORY AND IMAGING STUDIES     Recent Labs   Lab Test  09/01/17   1315  03/02/17   1044  02/27/16   1310  01/07/16   1305    NA  137  137  140  135*    POTASSIUM  5.0  4.3  4.4  4.9    CHLORIDE  102  102  105  98    CO2  26  29  28  28    ANIONGAP  9  6  7  5.9*    BUN  24  23  14  24    CR  1.24  1.36*  1.00  1.35*    GLC  186*  102*  88  205*    YUAN  9.3  9.3  8.7  9.3        Recent Labs   Lab Test  02/11/17   1049  02/27/16   1310  09/22/15   0533  09/21/15   1823  09/21/15   1350   03/16/14   1547  08/02/13   1713   WBC  16.1*  9.8  7.8  8.8  10.2   < >  11.8*  3.2*   HGB  12.1*  12.3*  11.2*  12.2*  12.3*   < >  15.3  13.6   PLT  270  288  234  264  280   < >  254  199   MCV  90  88  88  88  88   < >  89  89   NEUTROPHIL  70.5  65.5   --    --   70.4   --   80.5  22.3    < > = values in this interval not displayed.     Recent Labs   Lab Test  09/01/17   1315  03/02/17   1044  04/08/16   1202  09/21/15   1350  03/16/14   1547   BILITOTAL   --   0.3   --   0.3  0.7   ALKPHOS   --   " 92   --   95  93   ALT  25  20  <5*  22  23   AST   --   14   --   22  25   ALBUMIN   --   3.7   --   3.3*  3.9     TSH   Date Value Ref Range Status   03/02/2017 1.59 0.40 - 4.00 mU/L Final   04/10/2015 5.73 (H) 0.40 - 4.00 mU/L Final   03/16/2014 1.52 0.4 - 5.0 mU/L Final     No results for input(s): CEA in the last 46553 hours.  Results for orders placed or performed during the hospital encounter of 06/06/17   US Carotid Bilateral    Narrative    US CAROTID BILATERAL 6/6/2017 1:46 PM    HISTORY: 86-year-old male status post right carotid endarterectomy on  3/12/2009.    COMPARISON: Carotid ultrasound dated 6/9/2015    FINDINGS:     Right side:   On the grayscale images, scattered plaque is identified in the distal  common carotid artery extending into the internal carotid artery.  Spectral waveform analysis was performed. Peak systolic and diastolic  velocities in the right internal carotid artery are 104 and 31 cm/s  versus 95 and 26 cm/s on the prior exam. Per sonographic criteria,  degree of stenosis in the right internal carotid artery is 16-49%.  Flow in the right vertebral artery is antegrade.     Left side:   On the grayscale images, shadowing plaque is identified at the carotid  bifurcation extending into the internal and external carotid arteries.  Shadowing from the plaque obscures portions of the internal carotid  artery.  Spectral waveform analysis was performed. Peak systolic and diastolic   velocities in the left internal carotid artery are 245 and 65 cm/s  versus 173 and 36 cm/s on the prior exam. Per sonographic criteria,  degree of stenosis in the left internal carotid artery is 50-79%.  Flow in the left vertebral artery is antegrade.       Impression    IMPRESSION: Per sonographic criteria, there is a 16-49% stenosis in  the right internal carotid artery and a 50-79% stenosis in the left  internal carotid artery.    Degree of stenosis measured relative to the diameter of the normal  internal  carotid artery per NASCET or NASCET type criteria    SRUTHI FORDE MD       Lab Results   Component Value Date    PATH  10/28/2016     Patient Name: WAQAR SLAUGHTER  MR#: [id number]  Specimen #: [id number]  Collected: 10/28/2016  Received: 10/29/2016  Reported: 10/31/2016 13:57  Ordering Phy(s): OBI ALCANTAR  Additional Phy(s): WAQAR SANDERS    SPECIMEN(S):  Cecal polyp    FINAL DIAGNOSIS:  Large intestine, cecum, polypectomy  - Tubular adenoma  - No evidence of high-grade dysplasia or invasive malignancy    Electronically signed out by:    Raul Renee M.D.    CLINICAL HISTORY:  History of polyps    GROSS:  Received in formalin and labeled cecal polyp are 3 tissue fragments  measuring 0.4, 0.3 and 0.1 cm respectively.  Totally embedded, 1  cassette (Dictated by: Ki Newell MD 10/29/2016 11:22 AM)    MICROSCOPIC:  A formal microscopic examination was performed.    CPT Codes:  A: 31437-JO2    TESTING LAB LOCATION:  [address]  [address]  [phone]    COLLECTION SITE:  Client: [facility]  Location: [facility] (S)         Results for orders placed or performed during the hospital encounter of 06/06/17   US Carotid Bilateral    Narrative    US CAROTID BILATERAL 6/6/2017 1:46 PM    HISTORY: 86-year-old male status post right carotid endarterectomy on  3/12/2009.    COMPARISON: Carotid ultrasound dated 6/9/2015    FINDINGS:     Right side:   On the grayscale images, scattered plaque is identified in the distal  common carotid artery extending into the internal carotid artery.  Spectral waveform analysis was performed. Peak systolic and diastolic  velocities in the right internal carotid artery are 104 and 31 cm/s  versus 95 and 26 cm/s on the prior exam. Per sonographic criteria,  degree of stenosis in the right internal carotid artery is 16-49%.  Flow in the right vertebral artery is antegrade.     Left side:   On the grayscale images,  shadowing plaque is identified at the carotid  bifurcation extending into the internal and external carotid arteries.  Shadowing from the plaque obscures portions of the internal carotid  artery.  Spectral waveform analysis was performed. Peak systolic and diastolic   velocities in the left internal carotid artery are 245 and 65 cm/s  versus 173 and 36 cm/s on the prior exam. Per sonographic criteria,  degree of stenosis in the left internal carotid artery is 50-79%.  Flow in the left vertebral artery is antegrade.       Impression    IMPRESSION: Per sonographic criteria, there is a 16-49% stenosis in  the right internal carotid artery and a 50-79% stenosis in the left  internal carotid artery.    Degree of stenosis measured relative to the diameter of the normal  internal carotid artery per NASCET or NASCET type criteria    SRUTHI FORDE MD           ASSESSMENT    1. Recurrent non muscle invasive bladder cancer s/p BCG inductions in 2011 and 2012  2. ECOG PS 1  3. Multiple medical comorbidities including previous NSTEMI/coronary artery disease s/p CABG and PTCA with stents, HTN, DM TII  4. Prostate cancer treated with definitive radiation therapy in 2001    DISCUSSION   Bora comes with his wife at this clinic visit.  I had a lengthy discussion with them regarding the management of what appears to be non-muscle-invasive, high-grade, recurrent urothelial carcinoma.  He did initially present in 2001 and has had serial transurethral resections for his bladder tumor.  He also underwent what appears to be induction BCG treatments in 04/2011 and then again from 02/2012 through 03/2012.  He has had serial cystoscopies and resection of tumor since then.  Non-muscle-invasive bladder cancer is typically managed by a urologist with serial complete resections followed by intravesical instillation of BCG or other chemotherapeutics.  There is an advantage of continuing with maintenance BCG to keep the disease in check.  I am  "not sure if he has ever had BCG maintenance therapy done.  As a first step, I would like him to follow up with one of our urologists, either Dr. Argueta or Dr. Pawel Higuera, for an initial assessment of his disease.  I will review his case at our multispecialty urology tumor board to come up with a definitive plan for his management.      We do have a clinical trial for patients who are unresponsive to BCG therapy or have recurrent failures.  This is a phase II study with a PD-1 (programmed cell death receptor 1) monoclonal antibody, pembrolizumab.  This medication is administered intravenously every 3 weeks and has been proven to be effective in patients with recurrent metastatic disease.  Its role in non-muscle-invasive bladder cancer is currently being studied.  He would be a candidate for this study if it is determined that any additional BCG therapy would not be helpful for him.       His wife was quite disappointed after discussion with Dr. Rafat Pride about 2 weeks ago.  He has advised no further therapy for his recurrent bladder cancer.  Clearly they want to address his disease.        PLAN   1. Refer him to Dr. Argueta or Dr. Higuera in Urology for evaluation of recurrent bladder cancer; likely non-muscle invasive  2. He would be a candidate for \"Phase II Clinical Trial to Study the Efficacy and Safety of Pembrolizumab (MK-3475) in Subjects with High Risk Non-muscle Invasive Bladder Cancer (NMIBC) Unresponsive to Bacillus Calmette-Rony (BCG) Therapy\"  3. I will see him after his consultation in urology and discussion in urology tumor board.     Over 60 min of direct face to face time spent with patient with more than 50% time spent in counseling and coordinating care.          Mika Duffy ,  Division of Hematology, Oncology & Transplantation  [facility].         "

## 2017-11-01 NOTE — PROGRESS NOTES
Met with pt and wife in clinic. Introduced myself and role as RN care coordinator. Contact information given. Encouraged pt and wife to contact clinic with any questions or concerns.

## 2017-11-09 NOTE — TELEPHONE ENCOUNTER
Pt with a history of bladder cancer coming in for a recurrence consult and possible enrollment in a study. Pt saw Dr. Sin. No need for a call.

## 2017-11-16 PROBLEM — C67.8 MALIGNANT NEOPLASM OF OVERLAPPING SITES OF BLADDER (H): Status: ACTIVE | Noted: 2017-01-01

## 2017-11-16 NOTE — LETTER
11/16/2017     RE: Bora Metzger  [address]  [facility] [address]     Dear Colleague,    Thank you for referring your patient, Bora Metzger, to the [facility] UROLOGY AND INST FOR PROSTATE AND UROLOGIC CANCERS at [facility]. Please see a copy of my visit note below.    We are pleased to see Mr. Bora Metzger in consultation at the request of Dr. 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 He thus underwent radiation rx from July to September 2001, with subsequent development of urinary incontinence, managed with 1-2 pads per day. 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 Repeat cystoscopy with bx was negative for disease recurrence in 7/2011, then showed high grade dysplasia in march 2013, with no repeat bx noted on chart review.  His most recent surveillance cystoscopy was mid-October and noted several small polyps within the bladder. His urologist at the time, Dr. Pride, advised against any further procedure for these polyps (TURBT or cystoscopy with fulguration/bx) given the patient's multiple comorbidities and recommended f/u cystoscopy in 9 months instead. The patient and his wife were unhappy with this plan and thus sought a second opinion at the [facility]. They saw Dr. Sin on 11/1/2017, who recommended that he be seen by our service for further evaluation/discussion of surgical management. Dr. Sin also mentions that if patient is deemed a poor candidate for repeat BCG rx, he may be a good candidate for a clinical trial medication - a PD-1 (programmed cell death receptor 1) monoclonal antibody,  "pembrolizumab.      His PMH is reviewed below but most notable for history of CAD, including MI in 1995 and 2015, s/p 3 v CABG (2005) and 5 stents, on plavix and repatha, and a right carotidendareterectomy with stable 80+% stenosis of his LCA.             Past Medical History:   CAD as above, s/p  3 v CABG and 5 stents, on plavix and repatha  Afib  PVD with claudication  B/l carotid artery stenosis, s/p Right CEA   HTN   T2DM         Past Surgical History:   Appendectomy, cholecystectomy, CABG 2005, Right CEA 2009, multiple cardiac angio with stents, tonsillectomy           Social History:   Retired        Smoking: former  Alcohol: social  IV Drug Use: None         Family History:   Noted for FHx of CHF, HTN, HLD, CAD, CVA  Urologic FHx of CAP           Allergies:   Reviewed in EMR. No pertinent issues except for contrast allergy (rash)            Medications:   Noted for baby ASA, metformin, ativan, nitrate, levothyroxine           REVIEW OF SYSTEMS:    See HPI for pertinent details.  Remainder of 10-point ROS negative.         PHYSICAL EXAM   /83  Pulse 67  Ht 1.702 m (5' 7\")  Wt 70.5 kg (155 lb 6.4 oz)  BMI 24.34 kg/m2  GENERAL: No acute distress. Well nourished.   HEENT:  Sclerae anicteric.  Conjunctivae pink.  Moist mucous membranes.  NECK:  Supple.  No lymphadenopathy.  LUNGS:  Non-labored breathing  BACK:  No costovertebral tenderness.  ABDOMEN: Soft, non-tender, cholecystectomy and appendectomy surgical scars, no organomegaly, non-tender.  RECTAL:  Good tone.  Prostate is s/p TURP, symmetric, non-tender, no nodules.  Minimal size  SKIN: No rashes.  Dry.     EXTREMITIES:  Warm, well perfused.  NO lower extremity edema bilaterally.  NEURO: normal gait, no focal deficits.           LABS AND IMAGING:   No recent [facility] imaging          ASSESSMENT:   Bora Metzger is a 86 year old male with extensive PMHx/PSHx and urologic hx of CAP (noted on TURP pathology, Gl 5+5, s/p EBRT in 2001), urinary " incontinence, and recurrent NMIBC (noted in 2011, s/p BCG induction x 2, TURBT x 2+) who presents for second opinion regarding management of his NMIBC as his urologist had recommended against further interventions for a recent recurrence on surveillance cystoscopy.             PLAN:   - schedule for blue light cystoscopy with TURBT followed by [facility] instillation  - patient will need to contact his cardiologist regarding when/if he can stop his plavix prior to the aforementioned procedure  - schedule pre-op evaluation     Farnaz Edward MD MS  Urology Resident    Patient was seen and examined with Dr. Argueta    Patient seen and examined with the resident.  Visit time 30 minutes and >50% spent in counseling.  I agree with the resident's note and plan of care.       Blanca Argueta MD  Urology Staff      CC  Patient Care Team:  Waqar Hardwick MD as PCP - General (Family Practice)  Alayna Rosario M, APRN CNP as Nurse Practitioner (Nurse Practitioner)  Mika Sin MD as MD (Oncology)  Merle Arguelles, RN as Nurse Coordinator (Oncology)  MIKA SIN    Copy to patient  WAQAR CAPONERIKILEY  [address]  [facility] [address]

## 2017-11-16 NOTE — PROGRESS NOTES
We are pleased to see Mr. Bora Metzger in consultation at the request of Dr. 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 He thus underwent radiation rx from July to September 2001, with subsequent development of urinary incontinence, managed with 1-2 pads per day. 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 Repeat cystoscopy with bx was negative for disease recurrence in 7/2011, then showed high grade dysplasia in march 2013, with no repeat bx noted on chart review.  His most recent surveillance cystoscopy was mid-October and noted several small polyps within the bladder. His urologist at the time, Dr. Pride, advised against any further procedure for these polyps (TURBT or cystoscopy with fulguration/bx) given the patient's multiple comorbidities and recommended f/u cystoscopy in 9 months instead. The patient and his wife were unhappy with this plan and thus [facility] Dr. Sin on 11/1/2017, who recommended that he be seen by our service for further evaluation/discussion of surgical management. Dr. Sin also mentions that if patient is deemed a poor candidate for repeat BCG rx, he may be a good candidate for a clinical trial medication - a PD-1 (programmed cell death receptor 1) monoclonal antibody, pembrolizumab.      His PMH is reviewed below but most notable for history of CAD, including MI in 1995 and 2015, s/p 3 v CABG (2005) and 5 stents, on plavix and repatha, and a right carotidendareterectomy with stable 80+% stenosis of his LCA.             Past Medical History:   CAD as above, s/p  3 v CABG and 5 stents,  "on plavix and repatha  Afib  PVD with claudication  B/l carotid artery stenosis, s/p Right CEA   HTN   T2DM         Past Surgical History:   Appendectomy, cholecystectomy, CABG 2005, Right CEA 2009, multiple cardiac angio with stents, tonsillectomy           Social History:   Retired        Smoking: former  Alcohol: social  IV Drug Use: None         Family History:   Noted for FHx of CHF, HTN, HLD, CAD, CVA  Urologic FHx of CAP           Allergies:   Reviewed in EMR. No pertinent issues except for contrast allergy (rash)            Medications:   Noted for baby ASA, metformin, ativan, nitrate, levothyroxine           REVIEW OF SYSTEMS:    See HPI for pertinent details.  Remainder of 10-point ROS negative.         PHYSICAL EXAM   /83  Pulse 67  Ht 1.702 m (5' 7\")  Wt 70.5 kg (155 lb 6.4 oz)  BMI 24.34 kg/m2  GENERAL: No acute distress. Well nourished.   HEENT:  Sclerae anicteric.  Conjunctivae pink.  Moist mucous membranes.  NECK:  Supple.  No lymphadenopathy.  LUNGS:  Non-labored breathing  BACK:  No costovertebral tenderness.  ABDOMEN: Soft, non-tender, cholecystectomy and appendectomy surgical scars, no organomegaly, non-tender.  RECTAL:  Good tone.  Prostate is s/p TURP, symmetric, non-tender, no nodules.  Minimal size  SKIN: No rashes.  Dry.     EXTREMITIES:  Warm, well perfused.  NO lower extremity edema bilaterally.  NEURO: normal gait, no focal deficits.           LABS AND IMAGING:   No recent [facility] imaging          ASSESSMENT:   Bora Metzger is a 86 year old male with extensive PMHx/PSHx and urologic hx of CAP (noted on TURP pathology, Gl 5+5, s/p EBRT in 2001), urinary incontinence, and recurrent NMIBC (noted in 2011, s/p BCG induction x 2, TURBT x 2+) who presents for second opinion regarding management of his NMIBC as his urologist had recommended against further interventions for a recent recurrence on surveillance cystoscopy.             PLAN:   - schedule for blue light cystoscopy with TURBT " followed by [facility] instillation  - patient will need to contact his cardiologist regarding when/if he can stop his plavix prior to the aforementioned procedure  - schedule pre-op evaluation     Farnaz Edward MD MS  Urology Resident    Patient was seen and examined with Dr. Argutea    Patient seen and examined with the resident.  Visit time 30 minutes and >50% spent in counseling.  I agree with the resident's note and plan of care.       Blanca Argueta MD  Urology Staff      CC  Patient Care Team:  Waqar Hardwick MD as PCP - General (Family Practice)  Alayna Rosario, LEENA CNP as Nurse Practitioner (Nurse Practitioner)  Mika Sin MD as MD (Oncology)  Merle Arguelles, RN as Nurse Coordinator (Oncology)  MIKA SIN    Copy to patient  WAQAR SLAUGHTER  [address]  [facility] [address]

## 2017-11-16 NOTE — PATIENT INSTRUCTIONS
Schedule surgery with Dr. Argueta.    Please leave a urine sample at the lab for cytology.    It was a pleasure meeting with you today.  Thank you for allowing me and my team the privilege of caring for you today.  YOU are the reason we are here, and I truly hope we provided you with the excellent service you deserve.  Please let us know if there is anything else we can do for you so that we can be sure you are leaving completely satisfied with your care experience.      EDILIA Lopez

## 2017-11-16 NOTE — MR AVS SNAPSHOT
After Visit Summary   11/16/2017    Bora Metzger    MRN: [id number]           Patient Information     Date Of Birth          1/15/1931        Visit Information        Provider Department      11/16/2017 4:40 PM Blanca Argueta MD [facility] Urology and [facility] for Prostate and Urologic Cancers        Today's Diagnoses     Malignant neoplasm of overlapping sites of bladder (H)    -  1      Care Instructions    Schedule surgery with Dr. Argueta.    Please leave a urine sample at the lab for cytology.    It was a pleasure meeting with you today.  Thank you for allowing me and my team the privilege of caring for you today.  YOU are the reason we are here, and I truly hope we provided you with the excellent service you deserve.  Please let us know if there is anything else we can do for you so that we can be sure you are leaving completely satisfied with your care experience.      Anupama Cowart, EMILI          Follow-ups after your visit        Additional Services     PAC Visit Referral (For [facility] Only)       Does this visit require an Anesthesia consult?  Yes - Evaluate for medical necessity related to one of the following conditions:  Management of CAD    H&P done by:  N/A and Other (Specify): PAC      Please be aware that coverage of these services is subject to the terms and limitations of your health insurance plan.  Call member services at your health plan with any benefit or coverage questions.      Please bring the following to your appointment:  >>   Any x-rays, CTs or MRIs which have been performed.  Contact the facility where they were done to arrange for  prior to your scheduled appointment.  Any new CT, MRI or other procedures ordered by your specialist must be performed at a [place] facility or coordinated by your clinic's referral office.    >>   List of current medications  >>   This referral request   >>   Any documents/labs given to you for this referral                   Follow-up notes from your care team     Return in about 4 weeks (around 12/14/2017).      Your next 10 appointments already scheduled     Nov 28, 2017  9:30 AM CST   (Arrive by 9:15 AM)   PAC EVALUATION with Uc Pac Cherise 2   [facility] ([facility])    [address] [place] [id number]   [phone]            Nov 28, 2017 10:30 AM CST   (Arrive by 10:15 AM)   PAC RN ASSESSMENT with Uc Pac Rn   [facility] [place] ([facility])    [address] [place] [address]   [phone]            Nov 28, 2017 11:10 AM CST   (Arrive by 10:55 AM)   PAC Anesthesia Consult with  Pac Anesthesiologist   [facility] Preoperative Assessment [place] ([facility])    [address] [place] [id number] [phone]            Dec 08, 2017   Procedure with Blanca Argueta MD   [facility] [place] Same Day Surgery (--)    [address] [facility] [address] [phone]            Dec 20, 2017 10:00 AM CST   (Arrive by 9:45 AM)   Post-Op with Blanca Argueta MD   [facility] Urology and [facility] for Prostate and Urologic Cancers ([facility])    [address] [place] [id number] [phone]              Who to contact     Please call your clinic at [phone] to:    Ask questions about your health    Make or cancel appointments    Discuss your medicines    Learn about your test results    Speak to your doctor   If you have compliments or concerns about an experience at your clinic, or if you wish to file a complaint, please contact [facility] Patient Relations at [phone] or email us at [email or web address]         Additional Information About Your Visit        MyChart Information     [employer] gives you secure access to your electronic health record.  "If you see a primary care provider, you can also send messages to your care team and make appointments. If you have questions, please call your primary care clinic.  If you do not have a primary care provider, please call [phone] and they will assist you.      [employer] is an electronic gateway that provides easy, online access to your medical records. With [employer], you can request a clinic appointment, read your test results, renew a prescription or communicate with your care team.     To access your existing account, please contact your [facility] or call [phone] for assistance.        Care EveryWhere ID     This is your Care EveryWhere ID. This could be used by other organizations to access your [place] medical records  [id number]        Your Vitals Were     Pulse Height BMI (Body Mass Index)             67 1.702 m (5' 7\") 24.34 kg/m2          Blood Pressure from Last 3 Encounters:   11/16/17 147/83   11/01/17 114/69   08/16/17 136/72    Weight from Last 3 Encounters:   11/16/17 70.5 kg (155 lb 6.4 oz)   11/01/17 71.2 kg (157 lb)   08/16/17 68.5 kg (151 lb)              We Performed the Following     PAC Visit Referral (For [facility] Only)     Angy-Operative Worksheet        Primary Care Provider Office Phone # Fax #    Bora Hardwick [phone]       [address]  [facility] [address]        Equal Access to Services     EMILIANA ARTHUR : Hadii venita ku cherryo Sodany, waaxda luqadaha, qaybta kaalmada magenyada, kalyan lyon. So [facility] [phone].    ATENCIÓN: Si habla español, tiene a saunders disposición servicios gratuitos de asistencia lingüística. Llame al [phone].    We comply with applicable federal civil rights laws and [place] laws. We do not discriminate on the basis of race, color, national origin, age, disability, sex, sexual orientation, or gender identity.            Thank you!     Thank you for choosing [facility] " UROLOGY AND INST FOR PROSTATE AND UROLOGIC CANCERS  for your care. Our goal is always to provide you with excellent care. Hearing back from our patients is one way we can continue to improve our services. Please take a few minutes to complete the written survey that you may receive in the mail after your visit with us. Thank you!             Your Updated Medication List - Protect others around you: Learn how to safely use, store and throw away your medicines at www.disposemymeds.org.          This list is accurate as of: 11/16/17 11:59 PM.  Always use your most recent med list.                   Brand Name Dispense Instructions for use Diagnosis    * ASPIRIN 81 PO      Take by mouth daily    Malignant neoplasm of overlapping sites of bladder (H), History of prostate cancer, History of MI (myocardial infarction)       * aspirin 81 MG tablet     100 tablet    Take 1 tablet (81 mg) by mouth daily    CAD (coronary artery disease)       blood glucose monitoring lancets     1 Box    Use to test blood sugar 1 times daily or as directed.    Type 2 diabetes, HbA1C goal < 8% (H)       blood glucose monitoring meter device kit     1 kit    Use to test blood sugar 1 times daily or as directed.    Type 2 diabetes, HbA1C goal < 8% (H)       * blood glucose monitoring test strip    no brand specified    100 each    1 strip by In Vitro route daily Comfort curve test stripes    Type 2 diabetes, HbA1C goal < 8% (H)       * blood glucose monitoring test strip    ACCU-CHEK SMILEY PLUS    100 each    Use to test blood sugar 1 times daily or as directed.    Type 2 diabetes, HbA1C goal < 8% (H)       clopidogrel 75 MG tablet    PLAVIX    90 tablet    Take 1 tablet (75 mg) by mouth daily    Coronary artery disease involving native heart, angina presence unspecified, unspecified vessel or lesion type       COENZYME Q-10 PO      Take 1 tablet by mouth daily        doxycycline monohydrate 100 MG capsule       Malignant neoplasm of overlapping  sites of bladder (H), History of prostate cancer, History of MI (myocardial infarction)       DULoxetine 20 MG EC capsule    CYMBALTA    180 capsule    TAKE ONE CAPSULE BY MOUTH TWICE DAILY    Chronic pain syndrome, Type 2 diabetes mellitus with diabetic polyneuropathy, without long-term current use of insulin (H), Bilateral claudication of lower limb (H)       evolocumab 140 MG/ML prefilled autoinjector    REPATHA    2 each    Inject 1 mL (140 mg) Subcutaneous every 14 days    Statin intolerance       HYDROcodone-acetaminophen 5-325 MG per tablet    NORCO    90 tablet    Take 1 tablet by mouth every 6 hours as needed for pain #90 for three months supply.  Do not take with tramadol. (Patient only takes when tramadol does not work)    DDD (degenerative disc disease), cervical, Chronic pain syndrome       hydrocortisone 2.5 % cream    ANUSOL-HC    30 g    Place rectally 2 times daily as needed for hemorrhoids Do not apply for more that two weeks at a time    Internal hemorrhoids       IMDUR 30 MG 24 hr tablet   Generic drug:  isosorbide mononitrate      1-2 tabs daily        levocetirizine 5 MG tablet    XYZAL    90 tablet    Take 1 tablet (5 mg) by mouth every evening    Seasonal allergies       levothyroxine 25 MCG tablet    SYNTHROID/LEVOTHROID    90 tablet    Take 1 tablet (25 mcg) by mouth daily    Subclinical hypothyroidism       * lidocaine 5 % ointment    XYLOCAINE    50 g    Apply topically as needed for moderate pain    Chronic pain syndrome       * lidocaine 5 % Patch    LIDODERM    30 patch    Place 1 patch onto the skin as needed Over painful areas    DDD (degenerative disc disease), cervical, DDD (degenerative disc disease), lumbar       lisinopril 2.5 MG tablet    PRINIVIL/Zestril    180 tablet    Take 1 tablet (2.5 mg) by mouth every evening    Essential hypertension with goal blood pressure less than 140/90       LORazepam 0.5 MG tablet    ATIVAN    60 tablet    Take 1-2 tablets (0.5-1 mg) by mouth  nightly as needed Take 1-2 tabs by mouth as needed for anxiety and or sleeping-    Anxiety, Chronic pain syndrome       metFORMIN 500 MG 24 hr tablet    GLUCOPHAGE-XR    360 tablet    Take 2 tablets (1,000 mg) by mouth 2 times daily (with meals)    Type 2 diabetes mellitus without complication, without long-term current use of insulin (H)       metoprolol 50 MG tablet    LOPRESSOR    180 tablet    Take 1 tablet (50 mg) by mouth 2 times daily    Hypertension goal BP (blood pressure) < 140/90       Multi-vitamin Tabs tablet      Take 1 tablet by mouth daily        * nitroGLYcerin 0.4 MG sublingual tablet    NITROSTAT    100 tablet    1 TAB EVERY 5 MIN AS NEEDED, UP TO 3 PER EPISODE - Pt may  4 bottles of 25 at a time    Chronic ischemic heart disease, unspecified       * nitroGLYcerin 0.4 MG/HR 24 hr patch    NITRODUR    30 patch    Place 1 patch onto the skin daily    Chronic atrial fibrillation (H)       RESTASIS 0.05 % ophthalmic emulsion   Generic drug:  cycloSPORINE       Malignant neoplasm of overlapping sites of bladder (H), History of prostate cancer, History of MI (myocardial infarction)       tiZANidine 2 MG tablet    ZANAFLEX    30 tablet    Take 0.5-1 tablets (1-2 mg) by mouth 2 times daily as needed for muscle spasms    Chronic pain syndrome, Pain in joint, multiple sites       traMADol-acetaminophen 37.5-325 MG per tablet    ULTRACET    120 tablet    Two every morning and two at dinnertime as needed , discussed/knows not to use with vicodin since both had acetaminophen and would be over-sedating.    DDD (degenerative disc disease), cervical       * Notice:  This list has 8 medication(s) that are the same as other medications prescribed for you. Read the directions carefully, and ask your doctor or other care provider to review them with you.

## 2017-11-16 NOTE — NURSING NOTE
Chief Complaint   Patient presents with     Consult     Pt with a history of bladder cancer coming in for a recurrence consult and possible enrollment in a study. Pt saw Dr. Sin.      Abigail Arevalo

## 2017-11-28 NOTE — PATIENT INSTRUCTIONS
Preparing for Your Surgery      Name:  Bora Metzger   MRN:  [id number]   :  1/15/1931   Today's Date:  2017     Arriving for surgery:  Surgery date:  2017  Arrival time:    1145 am    Please come to:       [facility] [address]  [address] [place]  [address]    -   parking is available in front of the hospital from 5:15 am to 8:00 pm    -  Stop at the Information Desk in the lobby    -   Inform the information person that you are here for surgery. An escort to 3c will be provided. If you would not like an escort, please proceed to 3C on the 3rd floor. [phone]     What can I eat or drink?  -  You may have solid food or milk products until 8 hours prior to your surgery; until 12 midnight  -  You may have water, apple juice (NO APPLE CIDER) or 7up/Sprite until 2 hours prior to your surgery; until 1145; CLEAR LIQUIDS ONLY    Which medicines can I take?    -  Stop all vitamins and supplements one week before surgery;         Hold plavix for one week before surgery; continue aspirin      -  Please take ONLY these medications the day of surgery:  pain medication if needed, eye drops, isosorbide (1 pill),  metoprolol, levothyroxine, nitroglycerin if needed      How do I prepare myself?  -  Take two showers: one the night before surgery; and one the morning of surgery.         Use Scrubcare or Hibiclens to wash from neck down.  You may use your own shampoo and conditioner. No other hair products.   -  Do NOT use lotion, powder, deodorant, or antiperspirant the day of your surgery.  -  Do NOT wear any makeup, fingernail polish or jewelry.  -  Begin using Incentive Spirometer 1 week prior to surgery.  Use 4 times per day, up to 5 breaths each time.  Bring Incentive Spirometer to hospital.  -Do not bring your own medications to the hospital, except for inhalers and eye drops.  -  Bring your ID and insurance card.    Questions or Concerns:  If you have  questions or concerns, please call the  Preoperative Assessment Center, Monday-Friday 7AM-7PM:  [phone]    AFTER YOUR SURGERY  Breathing exercises   Breathing exercises help you recover faster. Take deep breaths and let the air out slowly. This will:     Help you wake up after surgery.    Help prevent complications like pneumonia.  Preventing complications will help you go home sooner.   We may give you a breathing device (incentive spirometer) to encourage you to breathe deeply.   Nausea and vomiting   You may feel sick to your stomach after surgery; if so, let your nurse know.    Pain control:  After surgery, you may have pain. Our goal is to help you manage your pain. Pain medicine will help you feel comfortable enough to do activities that will help you heal.  These activities may include breathing exercises, walking and physical therapy.   To help your health care team treat your pain we will ask: 1) If you have pain  2) where it is located 3) describe your pain in your words  Methods of pain control include medications given by mouth, vein or by nerve block for some surgeries.  We may give you a pain control pump that will:  1) Deliver the medicine through a tube placed in your vein  2) Control the amount of medicine you receive  3) Allow you to push a button to deliver a dose of pain medicine  Sequential Compression Device (SCD) or Pneumo Boots:  You may need to wear SCD S on your legs or feet. These are wraps connected to a machine that pumps in air and releases it. The repeated pumping helps prevent blood clots from forming.

## 2017-11-28 NOTE — ANESTHESIA PREPROCEDURE EVALUATION
Anesthesia Evaluation     . Pt has had prior anesthetic. Type: General and MAC    No history of anesthetic complications          ROS/MED HX    ENT/Pulmonary:     (+)YUNIER risk factors hypertension, , . .   (-) tobacco use   Neurologic:  - neg neurologic ROS    (-) CVA and TIA   Cardiovascular:     (+) Dyslipidemia, hypertension-Peripheral Vascular Disease-- Non Symptomatic and Carotid Stenosis, CAD, angina-with excertion, past MI,CABG-date: 2014, stent,last stent 2013 5 Drug Eluting Stent .. Taking blood thinners Pt has received instructions: Instructions Given to patient: Stay on ASA and hold Plavix x 1 week. . . . :. . Previous cardiac testing date:results:Stress Testdate: results:ECG reviewed date: results:Cath date: results:          METS/Exercise Tolerance:     Hematologic:  - neg hematologic  ROS      (-) History of Transfusion   Musculoskeletal: Comment: Fell off ladder 1 story age 55.  Has pain to neck and back that radiates to chest, anterior neck and arm.    Left had amputated and reattached after the fall.   (+) arthritis, , , -       GI/Hepatic:  - neg GI/hepatic ROS       Renal/Genitourinary:     (+) BPH,       Endo:     (+) type II DM Last HgA1c: 3/2017 date: 7.0 Not using insulin - not using insulin pump Normal glucose range: Does not test.  thyroid problem hypothyroidism, .      Psychiatric:     (+) psychiatric history anxiety      Infectious Disease:  - neg infectious disease ROS       Malignancy:   (+) Malignancy History of Prostate and Other  Prostate CA status post Radiation, Other CA Bladder Cancer Active status post         Other: Comment: [place]   (+) H/O Chronic Pain,H/O chronic opiod use ,                    Physical Exam      Airway   Mallampati: II  TM distance: >3 FB  Neck ROM: limited    Dental   (+) missing  Comment: Pain with neck ROM.     Cardiovascular   Rhythm and rate: regular and normal      Pulmonary    breath sounds clear to auscultation    Other findings:   11/28/2017  11:35  Sodium: 136  Potassium: 4.3  Chloride: 101  Carbon Dioxide: 28  Urea Nitrogen: 23  Creatinine: 1.32 (H)  GFR Estimate: 51 (L)  GFR Estimate If Black: 62  Calcium: 9.4  Anion Gap: 7  Glucose: 233 (H)    WBC: 10.9  Hemoglobin: 12.3 (L)  Hematocrit: 39.8 (L)  Platelet Count: 306  RBC Count: 4.31 (L)  MCV: 92  MCH: 28.5  MCHC: 30.9 (L)  RDW: 13.2    HEART CATH 9/2015  IMPRESSION:   1. Severe native vessel coronary disease  2. Patent LIMA to LAD and SVG to RCA  3. Occluded SVG to first diagonal, likely chronic    PLAN: The coronary and graft angiography findings were reviewed with  the patient and referring cardiologist. His LIMA and SVG to RCA are  patent. SVG to first diagonal is occluded and the occlusion appears  chronic. His symptoms are atypical for ischemic pain. The stress test  suggested ischemia in the LAD distribution which appears to be  adequately revascularized by the LIMA although the distal LAD and mid  LAD have diffuse disease. Has moderate to severe disease in his first  OM and the distal circumflex. Both lesions are amenable to  percutaneous coronary intervention, however they might not be the  cause of his symptoms. As such, we agreed on initial medical therapy.    [place] stress test 9/2015: Impression  1.  Myocardial perfusion imaging using single isotope technique  demonstrated medium-sized perfusion defect of moderate severity  involving the mid to basal anterior wall and the basal anteroseptal  wall which is mostly reversible and consistent with moderate ischemia  in the distribution of left anterior descending artery.   2. Gated images demonstrated normal left ventricular wall motion.  The  left ventricular systolic function is 60%  post stress and 70% on the  rest images.  3. There is no previous study for comparison.           PAC Discussion and Assessment    ASA Classification: 4  Case is suitable for: [place] and [place]  Anesthetic techniques and relevant risks discussed:  "GA  Invasive monitoring and risk discussed: No  Types:   Possibility and Risk of blood transfusion discussed: No  NPO instructions given:   Additional anesthetic preparation and risks discussed:   Needs early admission to pre-op area:   Other:     PAC Resident/NP Anesthesia Assessment:  Scheduled for Transurethral Removal Of Bladder Tumor With Cysview on 12/8/17 by Dr. Argueta in treatment of Bladder cancer.  PAC referral for risk assessment and optimization for anesthesia with comorbid conditions of:    ** caution with neck + pain    Pre-operative considerations:  1.  Cardiac:  Functional status limited.  Can walk 1-2 blocks. HIGH risk of major adverse cardiac event.  + CAD s/p CAB x 3 2004:  CABG, Nation-Lad, Vg-RI, VG-RCA (due to diffuse dz, rec life long plavix).  EF 55-60%.  Has severe diffuse disease and is medically managed.  On ASA, Plavix (life long), imdur, metoprolol, nitrodur patch prn, Imdur (30-60mg) and prn NTG SL, Evolocumab (IV med for chol), Lisinopril   Has chronic daily chest pain and has a hard time distinguishing between angina and MSK pain.  (He calls it \"heart attack pain and arthritic pain\").  Last cath 2015 demonstrated moderate to severe disease but not felt to be cause of symptoms.  EKG NSR with incomplete RBBB.    > Discussed with Dr. Medrano on 11/28/17.  No changes in management.  Stay on ASA and hold plavix x 7 days.  OK to proceed.  Wife and patient are aware of risk.   > He reports doing better from cardiac standpoint for past 1.5 years after starting on IV Repatha (med for chol).    > + Carotid artery disease: history of right CEA.  Left 50-79% (has close OP monitoring)  2.  Pulm:  Airway feasible.  YUNIER risk:  Low.  Non-smoker.   3.  GI:  Risk of PONV score = 2.  If 3 or > anti-emetic intervention recommended (with 2 or more meds).   4.  Onc:  History of prostate cancer s/p radiation.  + PVF.  Difficult time urinating.  + bladder/ urothelial cancer.   5.  Endo:  Type II diabetes " "mellitus, on Metformin.  Allergic to insulin.  Aic 7.0. Hypothyroidism, on replacement.  5.  Psych:  + anxiety,   6.  Chronic pain:  Uses 1-2 Vicodin per day and Tramadol prn  7.  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  Results given to urology.   7.  FULL code - discussed.        Instructed to hold Plavix x 7 days and stay on ASA per Dr. Argueta and Dr Medrano    Patient also evaluated by Dr. Colon. See recommendations below.           Reviewed and Signed by PAC Mid-Level Provider/Resident  Mid-Level Provider/Resident: Luisa Isbell PA-C  Date: 11/28/17  Time: 0948    Attending Anesthesiologist Anesthesia Assessment:  86 year old for TURBT in treatment of bladder cancer. Chart reviewed, patient seen and evaluated; agree with above assessment. Patient has significant but stable CAD - CABG X4 2004, cath 2015 found diffuse disease, but few good targets, so opted for medical management (lifelong plavix, ASA, will hold plavix for 7 days, remain on ASA). He take multiple forms of nitroglycerin for \"angina\" but part of this might be his arthritis as well. He is at increased cardiac risk, but this is low risk surgery. Followed closely by cardiology.    Patient is appropriate for the planned procedure without further workup or medical management change. The final anesthesia plan will be determined by the physician anesthesiologist caring for the patient on the day of surgery.    Reviewed and Signed by PAC Anesthesiologist  Anesthesiologist: patrica  Date: 11/28/3017  Time:   Pass/Fail: Pass  Disposition:     PAC Pharmacist Assessment:        Pharmacist:   Date:   Time:      Anesthesia Plan      History & Physical Review  History and physical reviewed and following examination; no interval change.    ASA Status:  4 .    NPO Status:  > 8 hours    Plan for General and LMA with Intravenous induction. Maintenance will be Balanced.    PONV prophylaxis:  Ondansetron (or other 5HT-3) and Dexamethasone or Solumedrol   "     Postoperative Care  Postoperative pain management:  Multi-modal analgesia.      Consents  Anesthetic plan, risks, benefits and alternatives discussed with:  Patient and Spouse..                          .

## 2017-11-28 NOTE — H&P
Pre-Operative H & P     CC:  Preoperative exam to assess for increased cardiopulmonary risk while undergoing surgery and anesthesia.    Date of Encounter: 11/28/2017  Primary Care Physician:  Bora Hardwick    HPI  Bora Metzger is a 86 year old male who presents for pre-operative H & P in preparation for TURBT with Dr. Argueta on 12/8/17 at [facility]. See Dr. Argueta's notes for details.  Mr. Metzger has a significant history of vascular disease most notable his CAD.  Symptoms are stable and actually improved over the past 1.5 years.  He has a history of chronic chest wall pain that is hard to distinguish from anginal pain.  He will often medicated with nitro and pain meds until symptoms subside.  Last cardiac eval was in 2015.  He is on medical management and followed by Dr. Medrano who I spoke to today.     History is obtained from the patient, electronic health record and patient's spouse.     Past Medical History  Past Medical History:   Diagnosis Date     Acute myocardial infarction, unspecified site, episode of care unspecified 1995     Anxiety      Arrhythmia     PAC, PVC     Arthritis      Atrial fibrillation (H)      Bilateral claudication of lower limb (H)      Bladder cancer (H) 2013     Carotid artery disease (H)     right carotid endarterectomy 2009     Chronic pain syndrome      Coronary artery disease     CVS rec lifelong plavix due to diffuse CAD     ED (erectile dysfunction)     since surgery and XRT     Epididymitis      Essential hypertension, benign      Herpes zoster without mention of complication 2007     History of radiation therapy     prostate, 2001 (37 treatments)     Hypothyroidism      Malignant neoplasm of prostate (H)      NSTEMI (non-ST elevated myocardial infarction) (H) 9/2015 9/2015 Heart cath - severe native vessel CAD, patent LIMA to LAD and SVG to RCA, occluded SVG to 1st diagonal (likely chronic)     Osteoporosis      Other  and unspecified hyperlipidemia     statin intolerance     Postsurgical aortocoronary bypass status 2004    Nation-Lad, VG-RI, VG-RCA (cath 2011 grafts open, only potential ischemia prox Lad before [place])     Stented coronary artery     prior to CAB: Lad, RI, brachyRx     Type II or unspecified type diabetes mellitus without mention of complication, not stated as uncontrolled      Unspecified hearing loss        Past Surgical History  Past Surgical History:   Procedure Laterality Date     APPENDECTOMY  1970     bcg treatment  2011-12 12 total     CAROTID ENDARTERECTOMY  2009    right side     CHOLECYSTECTOMY, OPEN  1987     COLONOSCOPY N/A 2/27/2015    Procedure: COMBINED COLONOSCOPY, SINGLE OR MULTIPLE BIOPSY/POLYPECTOMY BY BIOPSY;  Surgeon: Kenroy Licona MD;  Location:  GI     COLONOSCOPY N/A 10/28/2016    Procedure: COMBINED COLONOSCOPY, SINGLE OR MULTIPLE BIOPSY/POLYPECTOMY BY BIOPSY;  Surgeon: Kenroy Licona MD;  Location:  GI     CORONARY ANGIOGRAPHY ADULT ORDER  11/21/2011    all grafts open (only poss area of ischemia is Lad origin closed and Liima to Lad ok but small prox section filled via diag graft retro)     CORONARY ANGIOGRAPHY ADULT ORDER  1/28/2004     CORONARY ANGIOGRAPHY ADULT ORDER  9/2015    severe native vessel CAD, patent LIMA to LAD and SVG to RCA, occluded SVG to 1st diagonal (likely chronic     CORONARY ARTERY BYPASS  1/2004     CABG, Nation-Lad, Vg-RI, VG-RCA (due to diffuse dz, rec life long plavix)     CORONARY ARTERY BYPASS  2004    LIMA=LAD, SVG=Ramus, SVG=Right     CYSTOSCOPY, BIOPSY BLADDER, COMBINED  3/28/2013    Procedure: COMBINED CYSTOSCOPY, BIOPSY BLADDER;  CYSTOSCOPY, BLADDER BIOPSY, FULGURATION ;  Surgeon: Rafat Pride MD;  Location:  OR     CYSTOSCOPY, FULGURATE BLADDER TUMOR, COMBINED  3/28/2013    Procedure: COMBINED CYSTOSCOPY, FULGURATE BLADDER TUMOR;;  Surgeon: Rafat Pride MD;  Location:  OR     ENDARTERECTOMY CAROTID   R CEA 2009     eyelid surgery  2009      HC CORONARY STENT PERCUT, EA ADDTL VESSEL      6 total, last 2003     HC REMOVAL ANAL FISTULA,SUBCUTANEOUS       HC REPAIR OF NASAL SEPTUM  1994     HEART CATH, ANGIOPLASTY  6/2003     BrachyTx RI (?new stent also)     HEART CATH, ANGIOPLASTY  5/2003     Stent RI, GEORGE stent distal LAD (60%RCA)     HEART CATH, ANGIOPLASTY  4/2003    PTCA-RI     HEART CATH, ANGIOPLASTY  2000    LAD Stent     HERNIA REPAIR, INGUINAL RT/LT  1951    and 2007     SURGICAL HISTORY OF -       wrist surgery     SURGICAL HISTORY OF -   3/2/2011    Transurethral resection of bladder tumors     TONSILLECTOMY       TURP  2001       Hx of Blood transfusions/reactions: No     Hx of abnormal bleeding or anti-platelet use: ON ASA and Plavix    Steroid use in the last year: no    Personal or FH with difficulty with Anesthesia:  no    Prior to Admission Medications  Current Outpatient Prescriptions   Medication Sig Dispense Refill     RESTASIS 0.05 % ophthalmic emulsion Place 1 drop into both eyes 2 times daily        DULoxetine (CYMBALTA) 20 MG EC capsule TAKE ONE CAPSULE BY MOUTH TWICE DAILY (Patient taking differently: TAKE ONE CAPSULE BY MOUTH at bedtime) 180 capsule 1     HYDROcodone-acetaminophen (NORCO) 5-325 MG per tablet Take 1 tablet by mouth every 6 hours as needed for pain #90 for three months supply.  Do not take with tramadol. (Patient only takes when tramadol does not work) 90 tablet 0     traMADol-acetaminophen (ULTRACET) 37.5-325 MG per tablet Two every morning and two at dinnertime as needed , discussed/knows not to use with vicodin since both had acetaminophen and would be over-sedating. 120 tablet 2     metFORMIN (GLUCOPHAGE-XR) 500 MG 24 hr tablet Take 2 tablets (1,000 mg) by mouth 2 times daily (with meals) 360 tablet 1     LORazepam (ATIVAN) 0.5 MG tablet Take 1-2 tablets (0.5-1 mg) by mouth nightly as needed Take 1-2 tabs by mouth as needed for anxiety and or sleeping- 60 tablet 5     tiZANidine (ZANAFLEX) 2 MG tablet Take  0.5-1 tablets (1-2 mg) by mouth 2 times daily as needed for muscle spasms 30 tablet 1     isosorbide mononitrate (IMDUR) 30 MG 24 hr tablet 1-2 tabs daily       metoprolol (LOPRESSOR) 50 MG tablet Take 1 tablet (50 mg) by mouth 2 times daily 180 tablet 1     levothyroxine (SYNTHROID/LEVOTHROID) 25 MCG tablet Take 1 tablet (25 mcg) by mouth daily 90 tablet 2     evolocumab (REPATHA) 140 MG/ML prefilled autoinjector Inject 1 mL (140 mg) Subcutaneous every 14 days 2 each 12     nitroglycerin (NITRODUR) 0.4 MG/HR 24 hr patch Place 1 patch onto the skin daily 30 patch 12     lidocaine (LIDODERM) 5 % patch Place 1 patch onto the skin as needed Over painful areas 30 patch 5     lisinopril (PRINIVIL,ZESTRIL) 2.5 MG tablet Take 1 tablet (2.5 mg) by mouth every evening 180 tablet 2     clopidogrel (PLAVIX) 75 MG tablet Take 1 tablet (75 mg) by mouth daily 90 tablet 3     lidocaine (XYLOCAINE) 5 % ointment Apply topically as needed for moderate pain 50 g 3     levocetirizine (XYZAL) 5 MG tablet Take 1 tablet (5 mg) by mouth every evening 90 tablet 2     hydrocortisone (ANUSOL-HC) 2.5 % rectal cream Place rectally 2 times daily as needed for hemorrhoids Do not apply for more that two weeks at a time 30 g 2     COENZYME Q-10 PO Take 30 mg by mouth daily        aspirin 81 MG tablet Take 1 tablet (81 mg) by mouth daily 100 tablet 1     multivitamin, therapeutic with minerals (MULTI-VITAMIN) TABS Take 1 tablet by mouth daily       nitroglycerin (NITROSTAT) 0.4 MG SL tablet 1 TAB EVERY 5 MIN AS NEEDED, UP TO 3 PER EPISODE - Pt may  4 bottles of 25 at a time (Patient not taking: Reported on 11/28/2017) 100 tablet 3     blood glucose monitoring (ACCU-CHEK SMILEY PLUS) test strip Use to test blood sugar 1 times daily or as directed. 100 each 11     blood glucose monitoring (SOFTCLIX) lancets Use to test blood sugar 1 times daily or as directed. 1 Box 11     blood glucose monitoring (ACCU-CHEK SMILEY PLUS) meter device kit Use to  "test blood sugar 1 times daily or as directed. 1 kit 0     glucose blood VI test strips strip 1 strip by In Vitro route daily Comfort curve test stripes 100 each 3       Allergies  Allergies   Allergen Reactions     Insulin Anaphylaxis     Had this in hospital.  Unsure of which insulin.   \"went crazy\"    Caused shock.     Insulin Anaphylaxis     Amlodipine Besylate      Heart pounds uncontrollably      Contrast Dye      Developed itchy rash .     Crestor [Rosuvastatin Calcium]      Severe muscle pains     Gabapentin Diarrhea     Hmg-Coa-R Inhibitors      Levalo-- reaction, ended up in the hospital     Livalo [Pitavastatin]      After 7 days --severe muscle pains, joint pains.     Naprosyn [Naproxen] Unknown     Stomach disorder     Penicillins Hives     rash     Rosuvastatin Unknown     Welchol [Colesevelam] GI Disturbance     Ineffective on cholesterol     Zetia [Ezetimibe] Cramps     Ineffective on cholesterol       Social History  Social History     Social History     Marital status:      Spouse name: maicol      Number of children: N/A     Years of education: 18     Occupational History            retired     Social History Main Topics     Smoking status: Never Smoker     Smokeless tobacco: Never Used     Alcohol use No     Drug use: No     Sexual activity: No     Other Topics Concern     Caffeine Concern Yes     occasional coffee or a diet coke     Sleep Concern No     Stress Concern Yes     related to health concerns     Weight Concern No     Special Diet No     Exercise Yes     not much     Seat Belt Yes     Parent/Sibling W/ Cabg, Mi Or Angioplasty Before 65f 55m? No     father, mother, brother all now      Social History Narrative    Patient live independently with his spouse. He is a retired .    No biological children.  2 adopted children, living.            Family History  Family History   Problem Relation Age of Onset     Heart Failure Mother      Coronary " Artery Disease Mother       age 101 congestive heart attack     Hypertension Mother      Hyperlipidemia Mother      Thyroid Disease Mother      Myocardial Infarction Father      Coronary Artery Disease Father       age 83 heart attack     Hypertension Father      Hyperlipidemia Father      Prostate Cancer Father      Anesthesia Reaction Father      DIABETES Brother       age 51 drugs/alcohol     CEREBROVASCULAR DISEASE No family hx of          ROS/MED HX  The complete review of systems is negative other than noted in the HPI or here.     ENT/Pulmonary:     (+)YUNIER risk factors hypertension, , . .   (-) tobacco use   Neurologic:  - neg neurologic ROS    (-) CVA and TIA   Cardiovascular:     (+) Dyslipidemia, hypertension-Peripheral Vascular Disease-- Non Symptomatic and Carotid Stenosis, CAD, angina-with excertion, past MI,CABG-date: , stent,last stent   5 Drug Eluting Stent .. Taking blood thinners Pt has received instructions: Instructions Given to patient: Stay on ASA and hold Plavix x 1 week. .      METS/Exercise Tolerance:     Hematologic:  - neg hematologic  ROS      (-) History of Transfusion   Musculoskeletal: Comment: Fell off ladder 1 story age 55.  Has pain to neck and back that radiates to chest, anterior neck and arm.    Left had amputated and reattached after the fall.   (+) arthritis, , , -       GI/Hepatic:  - neg GI/hepatic ROS       Renal/Genitourinary:     (+) BPH,       Endo:     (+) type II DM Last HgA1c: 3/2017 date: 7.0 Not using insulin - not using insulin pump Normal glucose range: Does not test.  thyroid problem hypothyroidism, .      Psychiatric:     (+) psychiatric history anxiety      Infectious Disease:  - neg infectious disease ROS       Malignancy:   (+) Malignancy History of Prostate and Other  Prostate CA status post Radiation, Other CA Bladder Cancer Active status post         Other: Comment: [place]   (+) H/O Chronic Pain,H/O chronic opiod use ,          Temp: 98.1  " F (36.7  C) Temp src: Oral BP: 116/68 Pulse: 75   Resp: 14 SpO2: 97 %       156 lbs 12.8 oz  5' 8\"   Body mass index is 23.84 kg/(m^2).       Physical Exam  Constitutional: Awake, alert, cooperative, no apparent distress, and appears stated age.  Eyes: Pupils equal, round and reactive to light,  conjunctiva normal.  HENT: Normocephalic, oral pharynx with moist mucus membranes, good dentition. No goiter appreciated.  Elem, bilat hearing aids.   Respiratory: Clear to auscultation bilaterally, no crackles or wheezing.  Cardiovascular: Regular rate and rhythm, normal S1 and S2, and no murmur noted.  Carotids +2, no bruits. No edema. Palpable pulses to DP and PT arteries.   GI: Normal bowel sounds, soft, non-distended, non-tender, no masses palpated, no hepatosplenomegaly.  Surgical scars: well healed.   Lymph/Hematologic: No cervical lymphadenopathy and no supraclavicular lymphadenopathy.  Skin: Warm and dry.  No rashes at anticipated surgical site.   Musculoskeletal: Decreased ROM of neck. There is no redness, warmth, or swelling of the joints. Gross motor strength is normal.    Neurologic: Awake, alert, oriented to name, place and time. Cranial nerves II-XII are grossly intact. Gait is normal.   Neuropsychiatric: Calm, cooperative. Normal affect.     Labs: (personally reviewed)  11/28/2017 11:35  Sodium: 136  Potassium: 4.3  Chloride: 101  Carbon Dioxide: 28  Urea Nitrogen: 23  Creatinine: 1.32 (H)  GFR Estimate: 51 (L)  GFR Estimate If Black: 62  Calcium: 9.4  Anion Gap: 7  Glucose: 233 (H)    WBC: 10.9  Hemoglobin: 12.3 (L)  Hematocrit: 39.8 (L)  Platelet Count: 306  RBC Count: 4.31 (L)  MCV: 92  MCH: 28.5  MCHC: 30.9 (L)  RDW: 13.2    UC: 10,000 to 50,000 colonies/mL   Aerococcus urinae     HEART CATH 9/2015  IMPRESSION:   1. Severe native vessel coronary disease  2. Patent LIMA to LAD and SVG to RCA  3. Occluded SVG to first diagonal, likely chronic    PLAN: The coronary and graft angiography findings were " reviewed with  the patient and referring cardiologist. His LIMA and SVG to RCA are  patent. SVG to first diagonal is occluded and the occlusion appears  chronic. His symptoms are atypical for ischemic pain. The stress test  suggested ischemia in the LAD distribution which appears to be  adequately revascularized by the LIMA although the distal LAD and mid  LAD have diffuse disease. Has moderate to severe disease in his first  OM and the distal circumflex. Both lesions are amenable to  percutaneous coronary intervention, however they might not be the  cause of his symptoms. As such, we agreed on initial medical therapy.    [place] stress test 9/2015: Impression  1.  Myocardial perfusion imaging using single isotope technique  demonstrated medium-sized perfusion defect of moderate severity  involving the mid to basal anterior wall and the basal anteroseptal  wall which is mostly reversible and consistent with moderate ischemia  in the distribution of left anterior descending artery.   2. Gated images demonstrated normal left ventricular wall motion.  The  left ventricular systolic function is 60%  post stress and 70% on the  rest images.  3. There is no previous study for comparison.    EKG: Personally reviewed but formal cardiology read pending: NSR with incomplete RBBB.  NS ST abnormality.  QTc 416.       Outside records reviewed from: NA    ASSESSMENT and PLAN  Bora Metzger is a 86 year old male scheduled to undergo  Transurethral Removal Of Bladder Tumor With [place] on 12/8/17 by Dr. Argueta in treatment of Bladder cancer.  PAC referral for risk assessment and optimization for anesthesia with comorbid conditions of:    Pre-operative considerations:  1.  Cardiac:  Functional status limited.  Can walk 1-2 blocks. 0.9%++  risk of major adverse cardiac event.  + CAD s/p CAB x 3 2004:  CABG, Nation-Lad, Vg-RI, VG-RCA (due to diffuse dz, rec life long plavix).  EF 55-60%.  Has severe diffuse disease and is medically  managed. Followed by Dr. Medrano.  On ASA, Plavix (life long), imdur, metoprolol, nitrodur patch prn, Imdur (30-60mg) and prn NTG SL, Evolocumab (IV med for chol), Lisinopril   Has chronic daily chest pain and has a hard time distinguishing between angina and MSK pain.  Last cath 2015 demonstrated moderate to severe disease but not felt to be cause of symptoms.  > He reports doing better from cardiac standpoint for past 1.5 years after starting on IV Repatha (med for chol).    > Discussed with Dr. Medrano on 11/28/17.  No changes in management.  Stay on ASA and hold plavix x 7 days.  OK to proceed.  Wife and patient are aware of risk.   > + Carotid artery disease: history of right CEA.  Left 50-79% (has close OP monitoring)  2.  Pulm:  Airway feasible.  YUNIER risk:  Low.  Non-smoker.   3.  GI:  Risk of PONV score = 2.  If 3 or > anti-emetic intervention recommended (with 2 or more meds).   4.  Onc:  History of prostate cancer s/p radiation.  + bladder/ urothelial cancer.   5.  Endo:  Type II diabetes mellitus, on Metformin.  Allergic to Insulin.  Aic 7.0. Hypothyroidism, on replacement.  5.  Psych:  + anxiety,   6.  Chronic pain:  Uses 1-2 Vicodin per day and Tramadol prn  7.  FULL code - discussed.   8.  Delirium risk due to age, [place] and ? Cognitive disorder.  Wife provides the majority of the history.     For complete medication and diet instructions for surgery, please refer to the AVS completed by nursing.  UC + 10-50k.  Results sent to [place] with urology.     Patient was discussed with Dr Colon.    Luisa Isbell PA-C  Preoperative Assessment Center  [facility]  [facility]  Phone: [phone]  Fax: [phone]

## 2017-11-28 NOTE — MR AVS SNAPSHOT
After Visit Summary   2017    Bora Metzger    MRN: [id number]           Patient Information     Date Of Birth          1/15/1931        Visit Information        Provider Department      2017 10:30 AM Rn, [facility]        Care Instructions    Preparing for Your Surgery      Name:  Bora Metzger   MRN:  [id number]   :  1/15/1931   Today's Date:  2017     Arriving for surgery:  Surgery date:  2017  Arrival time:    1145 am    Please come to:       [facility] [address]  [address] [place]  [address]       parking is available in front of the hospital from 5:15 am to 8:00 pm    -  Stop at the Information Desk in the lobby    -   Inform the information person that you are here for surgery. An escort to 3c will be provided. If you would not like an escort, please proceed to 3C on the 3rd floor. [phone]     What can I eat or drink?  -  You may have solid food or milk products until 8 hours prior to your surgery; until 12 midnight  -  You may have water, apple juice (NO APPLE CIDER) or 7up/Sprite until 2 hours prior to your surgery; until 1145; CLEAR LIQUIDS ONLY    Which medicines can I take?    -  Stop all vitamins and supplements one week before surgery;         Hold plavix for one week before surgery; continue aspirin      -  Please take ONLY these medications the day of surgery:  pain medication if needed, eye drops, isosorbide (1 pill),  metoprolol, levothyroxine, nitroglycerin if needed      How do I prepare myself?  -  Take two showers: one the night before surgery; and one the morning of surgery.         Use Scrubcare or Hibiclens to wash from neck down.  You may use your own shampoo and conditioner. No other hair products.   -  Do NOT use lotion, powder, deodorant, or antiperspirant the day of your surgery.  -  Do NOT wear any makeup, fingernail polish or jewelry.  -  Begin  using Incentive Spirometer 1 week prior to surgery.  Use 4 times per day, up to 5 breaths each time.  Bring Incentive Spirometer to hospital.  -Do not bring your own medications to the hospital, except for inhalers and eye drops.  -  Bring your ID and insurance card.    Questions or Concerns:  If you have questions or concerns, please call the  Preoperative Assessment Center, Monday-Friday 7AM-7PM:  [phone]    AFTER YOUR SURGERY  Breathing exercises   Breathing exercises help you recover faster. Take deep breaths and let the air out slowly. This will:     Help you wake up after surgery.    Help prevent complications like pneumonia.  Preventing complications will help you go home sooner.   We may give you a breathing device (incentive spirometer) to encourage you to breathe deeply.   Nausea and vomiting   You may feel sick to your stomach after surgery; if so, let your nurse know.    Pain control:  After surgery, you may have pain. Our goal is to help you manage your pain. Pain medicine will help you feel comfortable enough to do activities that will help you heal.  These activities may include breathing exercises, walking and physical therapy.   To help your health care team treat your pain we will ask: 1) If you have pain  2) where it is located 3) describe your pain in your words  Methods of pain control include medications given by mouth, vein or by nerve block for some surgeries.  We may give you a pain control pump that will:  1) Deliver the medicine through a tube placed in your vein  2) Control the amount of medicine you receive  3) Allow you to push a button to deliver a dose of pain medicine  Sequential Compression Device (SCD) or Pneumo Boots:  You may need to wear SCD S on your legs or feet. These are wraps connected to a machine that pumps in air and releases it. The repeated pumping helps prevent blood clots from forming.                 Follow-ups after your visit        Your next 10 appointments  already scheduled     Nov 28, 2017 11:10 AM CST   (Arrive by 10:55 AM)   PAC Anesthesia Consult with  Pac Anesthesiologist   [facility] Preoperative Assessment Center ([facility])    [address]  [address] [facility] [id number] [phone]            Nov 28, 2017 11:30 AM CST   LAB with  LAB   [facility] Lab ([facility])    [address]  [address] [facility] [id number] [phone]           Please do not eat 10-12 hours before your appointment if you are coming in fasting for labs on lipids, cholesterol, or glucose (sugar). This does not apply to pregnant women. Water, hot tea and black coffee (with nothing added) are okay. Do not drink other fluids, diet soda or chew gum.            Dec 08, 2017   Procedure with Blanca Argueta MD   [facility] [place], Same Day Surgery (--)    [address] [place] [address] [phone]            Dec 20, 2017 10:00 AM CST   (Arrive by 9:45 AM)   Post-Op with Blanca Argueta MD   [facility] Urology and Inst for Prostate and Urologic Cancers ([facility])    [address] [id number] [phone]              Future tests that were ordered for you today     Open Future Orders        Priority Expected Expires Ordered    Basic metabolic panel Routine 11/28/2017 12/28/2017 11/28/2017    CBC with platelets Routine 11/28/2017 12/28/2017 11/28/2017    UA reflex to Microscopic and Culture Routine 11/28/2017 12/28/2017 11/28/2017            Who to contact     Please call your clinic at [phone] to:    Ask questions about your health    Make or cancel appointments    Discuss your medicines    Learn about your test results    Speak to your doctor   If you have compliments or concerns about an experience at your clinic, or if you wish to file a complaint, please contact [facility] Patient Relations at [phone] or  email us at [email or web address]         Additional Information About Your Visit        [employer] Information     [employer] gives you secure access to your electronic health record. If you see a primary care provider, you can also send messages to your care team and make appointments. If you have questions, please call your primary care clinic.  If you do not have a primary care provider, please call [phone] and they will assist you.      [employer] is an electronic gateway that provides easy, online access to your medical records. With [employer], you can request a clinic appointment, read your test results, renew a prescription or communicate with your care team.     To access your existing account, please contact your [facility] or call [phone] for assistance.        Care EveryWhere ID     This is your Care EveryWhere ID. This could be used by other organizations to access your [place] medical records  [id number]         Blood Pressure from Last 3 Encounters:   11/28/17 116/68   11/16/17 147/83   11/01/17 114/69    Weight from Last 3 Encounters:   11/28/17 71.1 kg (156 lb 12.8 oz)   11/16/17 70.5 kg (155 lb 6.4 oz)   11/01/17 71.2 kg (157 lb)              Today, you had the following     No orders found for display         Today's Medication Changes          These changes are accurate as of: 11/28/17 11:01 AM.  If you have any questions, ask your nurse or doctor.               These medicines have changed or have updated prescriptions.        Dose/Directions    aspirin 81 MG tablet   This may have changed:  Another medication with the same name was removed. Continue taking this medication, and follow the directions you see here.   Used for:  CAD (coronary artery disease)        Dose:  81 mg   Take 1 tablet (81 mg) by mouth daily   Quantity:  100 tablet   Refills:  1       DULoxetine 20 MG EC capsule   Commonly known as:  CYMBALTA   This may have changed:  See the  new instructions.   Used for:  Chronic pain syndrome, Type 2 diabetes mellitus with diabetic polyneuropathy, without long-term current use of insulin (H), Bilateral claudication of lower limb (H)        TAKE ONE CAPSULE BY MOUTH TWICE DAILY   Quantity:  180 capsule   Refills:  1         Stop taking these medicines if you haven't already. Please contact your care team if you have questions.     doxycycline monohydrate 100 MG capsule   Stopped by:  2,  Pac Cherise                    Primary Care Provider Office Phone # Fax #    Bora Hardwick [phone] [address]  [facility] [address]        Equal Access to Services     [facility]: Hadii venita suárez hadasho Sodany, waaxda luqadaha, qaybta kaalmajustin lazo, kalyan alexandra . So [facility] [phone].    ATENCIÓN: Si habla español, tiene a saunders disposición servicios gratuitos de asistencia lingüística. [facility] [phone].    We comply with applicable federal civil rights laws and [place] laws. We do not discriminate on the basis of race, color, national origin, age, disability, sex, sexual orientation, or gender identity.            Thank you!     Thank you for choosing [facility] PREOPERATIVE [facility]  for your care. Our goal is always to provide you with excellent care. Hearing back from our patients is one way we can continue to improve our services. Please take a few minutes to complete the written survey that you may receive in the mail after your visit with us. Thank you!             Your Updated Medication List - Protect others around you: Learn how to safely use, store and throw away your medicines at www.disposemymeds.org.          This list is accurate as of: 11/28/17 11:01 AM.  Always use your most recent med list.                   Brand Name Dispense Instructions for use Diagnosis    aspirin 81 MG tablet     100 tablet    Take 1 tablet (81 mg) by mouth daily    CAD (coronary artery disease)        blood glucose monitoring lancets     1 Box    Use to test blood sugar 1 times daily or as directed.    Type 2 diabetes, HbA1C goal < 8% (H)       blood glucose monitoring meter device kit     1 kit    Use to test blood sugar 1 times daily or as directed.    Type 2 diabetes, HbA1C goal < 8% (H)       * blood glucose monitoring test strip    no brand specified    100 each    1 strip by In Vitro route daily Comfort curve test stripes    Type 2 diabetes, HbA1C goal < 8% (H)       * blood glucose monitoring test strip    ACCU-CHEK SMILEY PLUS    100 each    Use to test blood sugar 1 times daily or as directed.    Type 2 diabetes, HbA1C goal < 8% (H)       clopidogrel 75 MG tablet    PLAVIX    90 tablet    Take 1 tablet (75 mg) by mouth daily    Coronary artery disease involving native heart, angina presence unspecified, unspecified vessel or lesion type       COENZYME Q-10 PO      Take 30 mg by mouth daily        DULoxetine 20 MG EC capsule    CYMBALTA    180 capsule    TAKE ONE CAPSULE BY MOUTH TWICE DAILY    Chronic pain syndrome, Type 2 diabetes mellitus with diabetic polyneuropathy, without long-term current use of insulin (H), Bilateral claudication of lower limb (H)       evolocumab 140 MG/ML prefilled autoinjector    REPATHA    2 each    Inject 1 mL (140 mg) Subcutaneous every 14 days    Statin intolerance       HYDROcodone-acetaminophen 5-325 MG per tablet    NORCO    90 tablet    Take 1 tablet by mouth every 6 hours as needed for pain #90 for three months supply.  Do not take with tramadol. (Patient only takes when tramadol does not work)    DDD (degenerative disc disease), cervical, Chronic pain syndrome       hydrocortisone 2.5 % cream    ANUSOL-HC    30 g    Place rectally 2 times daily as needed for hemorrhoids Do not apply for more that two weeks at a time    Internal hemorrhoids       IMDUR 30 MG 24 hr tablet   Generic drug:  isosorbide mononitrate      1-2 tabs daily        levocetirizine 5 MG tablet     XYZAL    90 tablet    Take 1 tablet (5 mg) by mouth every evening    Seasonal allergies       levothyroxine 25 MCG tablet    SYNTHROID/LEVOTHROID    90 tablet    Take 1 tablet (25 mcg) by mouth daily    Subclinical hypothyroidism       * lidocaine 5 % ointment    XYLOCAINE    50 g    Apply topically as needed for moderate pain    Chronic pain syndrome       * lidocaine 5 % Patch    LIDODERM    30 patch    Place 1 patch onto the skin as needed Over painful areas    DDD (degenerative disc disease), cervical, DDD (degenerative disc disease), lumbar       lisinopril 2.5 MG tablet    PRINIVIL/Zestril    180 tablet    Take 1 tablet (2.5 mg) by mouth every evening    Essential hypertension with goal blood pressure less than 140/90       LORazepam 0.5 MG tablet    ATIVAN    60 tablet    Take 1-2 tablets (0.5-1 mg) by mouth nightly as needed Take 1-2 tabs by mouth as needed for anxiety and or sleeping-    Anxiety, Chronic pain syndrome       metFORMIN 500 MG 24 hr tablet    GLUCOPHAGE-XR    360 tablet    Take 2 tablets (1,000 mg) by mouth 2 times daily (with meals)    Type 2 diabetes mellitus without complication, without long-term current use of insulin (H)       metoprolol 50 MG tablet    LOPRESSOR    180 tablet    Take 1 tablet (50 mg) by mouth 2 times daily    Hypertension goal BP (blood pressure) < 140/90       Multi-vitamin Tabs tablet      Take 1 tablet by mouth daily        * nitroGLYcerin 0.4 MG sublingual tablet    NITROSTAT    100 tablet    1 TAB EVERY 5 MIN AS NEEDED, UP TO 3 PER EPISODE - Pt may  4 bottles of 25 at a time    Chronic ischemic heart disease, unspecified       * nitroGLYcerin 0.4 MG/HR 24 hr patch    NITRODUR    30 patch    Place 1 patch onto the skin daily    Chronic atrial fibrillation (H)       RESTASIS 0.05 % ophthalmic emulsion   Generic drug:  cycloSPORINE      Place 1 drop into both eyes 2 times daily    Malignant neoplasm of overlapping sites of bladder (H), History of prostate  cancer, History of MI (myocardial infarction)       tiZANidine 2 MG tablet    ZANAFLEX    30 tablet    Take 0.5-1 tablets (1-2 mg) by mouth 2 times daily as needed for muscle spasms    Chronic pain syndrome, Pain in joint, multiple sites       traMADol-acetaminophen 37.5-325 MG per tablet    ULTRACET    120 tablet    Two every morning and two at dinnertime as needed , discussed/knows not to use with vicodin since both had acetaminophen and would be over-sedating.    DDD (degenerative disc disease), cervical       * Notice:  This list has 6 medication(s) that are the same as other medications prescribed for you. Read the directions carefully, and ask your doctor or other care provider to review them with you.

## 2017-12-04 NOTE — TELEPHONE ENCOUNTER
Patient's wife called. [employer] denied his renewal stating they need proof of insurance prior authorization. Wife does not have a copy of the insurance's prior authorization. RN called [facility] pharmacy to determine when prior authorization was granted. Awaiting answer.

## 2017-12-05 NOTE — TELEPHONE ENCOUNTER
PA Initiation    Medication: Repatha  Insurance Company: [employer] - Phone [phone] Fax [phone]  Pharmacy Filling the Rx: [facility] - [place], [place] - [facility] [address]  Filling Pharmacy Phone: [phone]  Filling Pharmacy Fax: [phone]  Start Date: 12/5/2017

## 2017-12-05 NOTE — PROGRESS NOTES
Pre Op Teaching Flowsheet       Pre and Post op Patient Education  Relevant Diagnosis:  Bladder tumor  Surgical procedure:  Transurethral removal of bladder tumor cysview  Teaching Topic:  Pre and post op teaching  Person Involved in teaching: Bora Metzger    Motivation Level:  Asks Questions: Yes  Eager to Learn:  Yes  Cooperative: Yes  Receptive (willing/able to accept information):  Yes    Patient demonstrates understanding of the following:  Date of surgery:  12/8/17  Location of surgery:  [address] [place] [address]  History and Physical and any other testing necessary prior to surgery: Yes  Required time line for completion of History and Physical and any pre-op testing: Yes    Patient demonstrates understanding of the following:  Pre-op bowel prep:  None needed  Pre-op showering/scrub information with PCMX Soap: Yes  Blood thinner medications discussed and when to stop (if applicable):  Yes      Infection Prevention:   Patient demonstrates understanding of the following:  Surgical procedure site care taught: at time of discharge  Signs and symptoms of infection taught:  Yes      Post-op follow-up:  Discussed how to contact the hospital, nurse, and clinic scheduling staff if necessary.    Instructional materials used/given/mailed:  [facility] Booklet, post op teaching sheet, Map, Soap, and arrival/location information.    Surgical instructions packet mailed to patient.

## 2017-12-06 NOTE — TELEPHONE ENCOUNTER
PRIOR AUTHORIZATION DENIED    Medication: Repatha    Denial Date:  12/6/17    Denial Rational: Praluent preferred     Appeal Information:

## 2017-12-08 NOTE — IP AVS SNAPSHOT
Same Day Surgery [address] [address]    Phone:  [phone]                                       After Visit Summary   12/8/2017    Bora Metzger    MRN: [id number]           After Visit Summary Signature Page     I have received my discharge instructions, and my questions have been answered. I have discussed any challenges I see with this plan with the nurse or doctor.    ..........................................................................................................................................  Patient/Patient Representative Signature      ..........................................................................................................................................  Patient Representative Print Name and Relationship to Patient    ..................................................               ................................................  Date                                            Time    ..........................................................................................................................................  Reviewed by Signature/Title    ...................................................              ..............................................  Date                                                            Time

## 2017-12-08 NOTE — OP NOTE
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  No tumors seen.  Blue light cysto was negative for fluorescence

## 2017-12-08 NOTE — DISCHARGE INSTRUCTIONS
[facility]  Same-Day Surgery   Adult Discharge Orders & Instructions     For 24 hours after surgery    1. Get plenty of rest.  A responsible adult must stay with you for at least 24 hours after you leave the hospital.   2. Do not drive or use heavy equipment.  If you have weakness or tingling, don't drive or use heavy equipment until this feeling goes away.  3. Do not drink alcohol.  4. Avoid strenuous or risky activities.  Ask for help when climbing stairs.   5. You may feel lightheaded.  IF so, sit for a few minutes before standing.  Have someone help you get up.   6. If you have nausea (feel sick to your stomach): Drink only clear liquids such as apple juice, ginger ale, broth or 7-Up.  Rest may also help.  Be sure to drink enough fluids.  Move to a regular diet as you feel able.  7. You may have a slight fever. Call the doctor if your fever is over 100 F (37.7 C) (taken under the tongue) or lasts longer than 24 hours.  8. You may have a dry mouth, a sore throat, muscle aches or trouble sleeping.  These should go away after 24 hours.  9. Do not make important or legal decisions.   Call your doctor for any of the followin.  Signs of infection (fever, growing tenderness at the surgery site, a large amount of drainage or bleeding, severe pain, foul-smelling drainage, redness, swelling).    2. It has been over 8 to 10 hours since surgery and you are still not able to urinate (pass water).    3.  Headache for over 24 hours.            To contact a doctor, call Dr. Argueta's office at [phone], 8 am to 4:30 pm or:    x   [phone] and ask for the resident on call for Urology Surgery (answered 24 hours a day)  x   Emergency Department:    [facility]: [phone]       (TTY for hearing impaired: [phone])

## 2017-12-08 NOTE — OR NURSING
Text page sent to Dr Rodrigues regarding patient going to Phase II and ongoing complaints of groin pain. Wife stated to previous RN that patient does not sit with HOB elevated due to back issues related to past radiation therapy. Attempted to reposition patient in the cart, but he was unwilling to move. All extremities strong and equal.

## 2017-12-08 NOTE — TELEPHONE ENCOUNTER
Prior Authorization Approval    Authorization Effective Date: 9/8/2017  Authorization Expiration Date: 12/7/2018  Medication: Praluent  Approved Dose/Quantity: $400  Reference #:     Insurance Company: MEDICA - Phone [phone] Fax [phone]  Expected CoPay:       CoPay Card Available:      Foundation Assistance Needed:    Which Pharmacy is filling the prescription (Not needed for infusion/clinic administered): [facility] - [place], [place] - [facility] [address]  Pharmacy Notified:    Patient Notified:

## 2017-12-08 NOTE — OR NURSING
"Pt's wife adamant that she be with pt in PACU.  She states that pt is extremely [place] and will be confused and scared without her.  Discussed [facility] surgery policy/procedure with pt's wife, but she states that she will be extremely upset if she is not with pt when he is in the recovery room.  Request relayed in \"Alerts\" and 3C and PACU Charge RNs notified.    "

## 2017-12-08 NOTE — OR NURSING
Dr. Rodrigues at the bedside to assess patient complaint of pain.  Patient states that pain is resolving with Norco.  Patient approved for discharge to home.    Patient and spouse consent to understanding discharge instructions and hagan catheter care.  Patient plans to go to Urology Clinic and remove hagan catheter.

## 2017-12-08 NOTE — TELEPHONE ENCOUNTER
"RN called patient to provide an update regarding the Prior Authorization for Repatha. The insurance switched the formulary to Praluent. RN asked patient to call back to discuss. RN sent an appeal see below to [employer].     \"[employer] changed the formulary from Repatha to Praluent. This patient was approved for Praluent with a co-pay of approximately $400. This co-pay is a financial hardship for the patient. Repatha was approved for this patient through [employer] last year. This provided an opportunity for the patient to apply for the [facility]. The patient met the financial requirements and was provided the drug at no cost. The [employer] [facility] requires prior authorization for Repatha in order to resume the delivery of Repatha at no cost. If the patient is obligated to use Praluent, he will not be able to afford the monthly cost for the drug. Currently [employer] does not offer a safety net foundation to offset the co-pay for patients unable to afford the drug. Please make an exception for the approval of Repatha over Praluent.\"     RN received a faxed letter from [employer]. Patient was approved for Repatha. Prior authorization letter was faxed to [facility]. RN left a voicemail to inform patient that he was approved for Repatha.   "

## 2017-12-08 NOTE — TELEPHONE ENCOUNTER
Prior Authorization Approval    Authorization Effective Date: 9/8/2017  Authorization Expiration Date: 12/7/2018  Medication: Praluent  Approved Dose/Quantity: $400  Reference #:     Insurance Company: [employer] - Phone [phone] Fax [phone]  Expected CoPay:       CoPay Card Available:      Foundation Assistance Needed:    Which Pharmacy is filling the prescription (Not needed for infusion/clinic administered): [facility] - [place], [place] - [facility] [address]  Pharmacy Notified:    Patient Notified:

## 2017-12-08 NOTE — ANESTHESIA POSTPROCEDURE EVALUATION
Patient: Bora Metzger    Procedure(s):   cystoscopy, bladder biopsy,  bilateral ureter washings. - Wound Class: II-Clean Contaminated    Diagnosis:Malignant Neoplasm Of Overlapping Sites Of Bladder   Diagnosis Additional Information: No value filed.    Anesthesia Type:  No value filed.    Note:  Anesthesia Post Evaluation    Patient location during evaluation: PACU  Patient participation: Able to fully participate in evaluation  Level of consciousness: awake and alert  Pain management: satisfactory to patient  Airway patency: patent  Cardiovascular status: blood pressure returned to baseline and hemodynamically stable  Respiratory status: room air  Hydration status: euvolemic  PONV: controlled     Anesthetic complications: None          Last vitals:  Vitals:    12/08/17 1100 12/08/17 1115 12/08/17 1130   BP: 171/83 164/83 166/88   Pulse:      Resp: 15 15 19   Temp:   36.8  C (98.2  F)   SpO2: 99% 99% 96%         Electronically Signed By: Abran Malone MD  December 8, 2017  12:00 PM

## 2017-12-08 NOTE — OP NOTE
Preoperative diagnosis bladder cancer   Postoperative diagnosis bladder cancer  Procedure performed bladder biopsy, bilateral ureteral washings with cysview  [facility]  Anesthesia General with endotracheal intubation I  Indications for procedure: Patient is a 86-year-old male with history of recurrent non-muscle invasive bladder cancer.  He has failed intravesical therapy with BCG.  He has also received radiation therapy for prostate cancer over 10 years ago.  Here today for evaluation of a positive cytology and recent history of recurrent tumor  Description of procedure: After fully informed consent was obtained patient brought into  the operating room properly identified and placed in a dorsolithotomy position on the table.  General anesthesia was induced and endotracheal intubation performed.  The groin area was prepped and draped in usual sterile fashion with Betadine.  A 26 Icelandic resectoscope was placed in the bladder the bladder inspected in a systematic fashion.  There were significant evidence of radiation changes but no obvious papillary tumors were identified.  Both ureteral orifices were identified effluxing clear urine.  The prostate appeared quite wide open but no obstructive tissue.  We then replaced the resectoscope with a standard 21 Icelandic rigid cystoscope.  The right ureter was first cannulated using a 5 Icelandic ureteral catheter.  We obtained right upper tract washings using saline.  We then cannulated left ureter in a similar fashion obtained ureteral washings on the left side as well.  We then thoroughly inspected the bladder using blue light cystoscopy and found no evidence of fluorescent tumors.  We then used the flexible biopsy forceps to obtain biopsies from the posterior right left lateral walls dome and trigone.  We then replaced the rigid cystoscope with a standard 26 Icelandic resectoscope and fulgurated all the areas of biopsy to ensure good hemostasis.  Once we were satisfied that  good hemostasis had been obtained removed the resectoscope and placed a 22 German two-way Muhammad in the bladder.  Patient tolerated the procedure well and was taken recovery in stable condition.  I was present and performed the entire procedure.  Estimated blood loss was 10 cc.

## 2017-12-08 NOTE — IP AVS SNAPSHOT
MRN:[id number]                      After Visit Summary   12/8/2017    Bora Metzger    MRN: [id number]           Thank you!     Thank you for choosing [facility] for your care. Our goal is always to provide you with excellent care. Hearing back from our patients is one way we can continue to improve our services. Please take a few minutes to complete the written survey that you may receive in the mail after you visit with us. Thank you!        Patient Information     Date Of Birth          1/15/1931        About your hospital stay     You were admitted on:  December 8, 2017 You last received care in the:  Same Day Surgery [facility]    You were discharged on:  December 8, 2017       Who to Call     For medical emergencies, please call 911.  For non-urgent questions about your medical care, please call your primary care provider or clinic, [phone]  For questions related to your surgery, please call your surgery clinic        Attending Provider     Provider Blanca Majano MD Urology       Primary Care Provider Office Phone # Fax #    Bora Hardwick [phone]      After Care Instructions     Discharge Instructions       PLEASE INSTRUCT PATIENT ABOUT THE FOLLOWING:    YOU CAN RESTART YOUR PLAVIX ON Tuesday OR Wednesday AS LONG AS YOU DON'T HAVE ANY BLOOD IN THE URINE. CONTINUE YOUR ASPIRIN AS YOU DID PRIOR TO SURGERY(i.e. Do NOT hold your aspirin)            Discharge Instructions       Activity  - No strenuous exercise for 3 weeks.  - No lifting, pushing, pulling more than 15 pounds for 3 weeks.   - Do not strain with bowel movements.  - Do not drive until you can press the brake pedal quickly and fully without pain.   - Do not operate a motor vehicle while taking narcotic pain medications.     Urination  - Catheter can be removed on Monday--either in the urology clinic or by a local provider. Please have the pt call the clinic to confirm his  appointment later today. A message will be sent to the  to schedule an appointment in nursing clinic on Monday.  - Some light redness (up to a watermelon-like-pink in color) is expected in the upcoming 7-10days.   - If having hematuria (blood in the urine), make sure to increase your water intake and monitor for improvement  - If the catheter stops draining you should return urgently to the ED or call clinic to try to arrange for an urgent visit same day.   - You are going home with the following tubes or drains: hagan catheter.  Nurse/ to write care and instructions.  Treat the catheter like an extension of your body; do not let it get caught or snagged on anything.  Leave the catheter in place until your follow up. Call the numbers listed above for any concerns.     Medications  - YOU CAN RESTART YOUR PLAVIX ON Tuesday OR Wednesday AS LONG AS YOU DON'T HAVE ANY BLOOD IN THE URINE. CONTINUE YOUR ASPIRIN AS PRIOR TO SURGERY.  - Take the prescribed antibiotic for 3 days (bactrim by mouth twice daily for 3 days)  - You can take the prescribed bladder spasm medications as prescribed  -  Transition from narcotic pain medications to tylenol (acetaminophen) as you are able.  Do NOT combine with your home narcotic medications. Wean yourself off all pain medications as you are able. Narcotics can make you constipated.    - No driving or operating machinery while taking narcotic pain medications . Tylenol should be sufficient and even that for only for a day or two. Wean yourself off all pain medications as you are able.  - Some pain medications contain both tylenol (acetaminophen) and a narcotic (Norco, vicodin, percocet), do not take more than 4,000mg of Tylenol (acetaminophen) from all sources in any 24 hour period.  - Take over the prescribed stool softeners for 7-10 days, but stop if you develop diarrhea.      Follow-Up:  - Follow up with Dr Argueta in the urology clinic on 11/20/17. Hagan catheter  "can be removed on Monday (A message will be sent to the  to schedule an appointment in nursing clinic on Monday)  - Call your primary care provider to touch base regarding your recent admission.    - Call or return sooner than your regularly scheduled visit if you develop any of the following: fever (greater than 101.5), uncontrolled pain, uncontrolled nausea or vomiting, as well as increased redness, swelling, or drainage from your wound.     Phone numbers:   - Monday through Friday 8am to 4:30pm: Call [phone] with questions or to schedule or confirm appointment.    - Nights or weekends: call the after hours emergency pager - [phone] and tell the  \"I would like to page the Urology Resident on call.\"  - For emergencies, call [phone]                  Your next 10 appointments already scheduled     Dec 11, 2017 10:20 AM CST   (Arrive by 10:05 AM)   Return Visit with  Pro[facility] Nurse   [facility] ([facility])    [address] [place] [id number] [phone]            Dec 20, 2017 10:00 AM CST   (Arrive by 9:45 AM)   Post-Op with Blanca Argueta MD   [facility] ([facility])    [address] [place] [id number] [phone]              Further instructions from your care team       [facility]  Same-Day Surgery   Adult Discharge Orders & Instructions     For 24 hours after surgery    1. Get plenty of rest.  A responsible adult must stay with you for at least 24 hours after you leave the hospital.   2. Do not drive or use heavy equipment.  If you have weakness or tingling, don't drive or use heavy equipment until this feeling goes away.  3. Do not drink alcohol.  4. Avoid strenuous or risky activities.  Ask for help when climbing stairs.   5. You " may feel lightheaded.  IF so, sit for a few minutes before standing.  Have someone help you get up.   6. If you have nausea (feel sick to your stomach): Drink only clear liquids such as apple juice, ginger ale, broth or 7-Up.  Rest may also help.  Be sure to drink enough fluids.  Move to a regular diet as you feel able.  7. You may have a slight fever. Call the doctor if your fever is over 100 F (37.7 C) (taken under the tongue) or lasts longer than 24 hours.  8. You may have a dry mouth, a sore throat, muscle aches or trouble sleeping.  These should go away after 24 hours.  9. Do not make important or legal decisions.   Call your doctor for any of the followin.  Signs of infection (fever, growing tenderness at the surgery site, a large amount of drainage or bleeding, severe pain, foul-smelling drainage, redness, swelling).    2. It has been over 8 to 10 hours since surgery and you are still not able to urinate (pass water).    3.  Headache for over 24 hours.            To contact a doctor, call Dr. Argueta's office at [phone], 8 am to 4:30 pm or:    x   [phone] and ask for the resident on call for Urology Surgery (answered 24 hours a day)  x   Emergency Department:    [facility]: [phone]       (TTY for hearing impaired: [phone])          Pending Results     Date and Time Order Name Status Description    2017 0928 Surgical pathology exam In process     2017 0921 Cytology non gyn In process             Statement of Approval     Ordered          17 1003  I have reviewed and agree with all the recommendations and orders detailed in this document.  EFFECTIVE NOW     Approved and electronically signed by:  Ross Rodrigues MD           17 1012  I have reviewed and agree with all the recommendations and orders detailed in this document.  EFFECTIVE NOW     Approved and electronically signed by:  Ross Rodrigues MD             Admission Information     Date & Time  "Provider Department Dept. Phone    12/8/2017 Blanca Argueta MD Same Day Surgery [facility] [phone]      Your Vitals Were     Blood Pressure Pulse Temperature Respirations Height Weight    149/71 71 98.6  F (37  C) (Oral) 12 1.727 m (5' 7.99\") 70.5 kg (155 lb 6.8 oz)    Pulse Oximetry BMI (Body Mass Index)                96% 23.64 kg/m2          [facility] Information     [facility] gives you secure access to your electronic health record. If you see a primary care provider, you can also send messages to your care team and make appointments. If you have questions, please call your primary care clinic.  If you do not have a primary care provider, please call [phone] and they will assist you.        Care EveryWhere ID     This is your Care EveryWhere ID. This could be used by other organizations to access your [facility] medical records  [id number]        Equal Access to Services     RAHEEM ARTHUR AH: Sakshi Ahuja, renzo anthony, jerica kaalmajustin lazo, kalyan alexandra . So [place] [phone].    ATENCIÓN: Si habla español, tiene a saunders disposición servicios gratuitos de asistencia lingüística. Llame al [phone].    We comply with applicable federal civil rights laws and [place] laws. We do not discriminate on the basis of race, color, national origin, age, disability, sex, sexual orientation, or gender identity.               Review of your medicines      START taking        Dose / Directions    acetaminophen 325 MG tablet   Commonly known as:  TYLENOL   Used for:  Malignant neoplasm of overlapping sites of bladder (H)        Dose:  325-650 mg   Take 1-2 tablets (325-650 mg) by mouth every 6 hours as needed for other (mild pain)   Quantity:  30 tablet   Refills:  0       senna-docusate 8.6-50 MG per tablet   Commonly known as:  SENOKOT-S;PERICOLACE   Used for:  Malignant neoplasm of overlapping sites of bladder (H)        Dose:  1-2 tablet   Take 1-2 tablets " by mouth 2 times daily To prevent constipation while taking narcotic pain medication. Start with 1 tablet twice daily. If no bowel movement in 24 hours, increase to 2 tablets twice daily.  Discontinue if you have loose stools or when you are no longer taking narcotics.   Quantity:  30 tablet   Refills:  0       sulfamethoxazole-trimethoprim 800-160 MG per tablet   Commonly known as:  BACTRIM DS/SEPTRA DS   Used for:  Malignant neoplasm of overlapping sites of bladder (H)        Dose:  1 tablet   Take 1 tablet by mouth 2 times daily for 3 days   Quantity:  6 tablet   Refills:  0       tolterodine 4 MG 24 hr capsule   Commonly known as:  DETROL LA   Used for:  Malignant neoplasm of overlapping sites of bladder (H)        Dose:  4 mg   Take 1 capsule (4 mg) by mouth daily as needed For bladder spasm/pain   Quantity:  2 capsule   Refills:  0         CONTINUE these medicines which may have CHANGED, or have new prescriptions. If we are uncertain of the size of tablets/capsules you have at home, strength may be listed as something that might have changed.        Dose / Directions    DULoxetine 20 MG EC capsule   Commonly known as:  CYMBALTA   This may have changed:  See the new instructions.   Used for:  Chronic pain syndrome, Type 2 diabetes mellitus with diabetic polyneuropathy, without long-term current use of insulin (H), Bilateral claudication of lower limb (H)        TAKE ONE CAPSULE BY MOUTH TWICE DAILY   Quantity:  180 capsule   Refills:  1       * HYDROcodone-acetaminophen 5-325 MG per tablet   Commonly known as:  NORCO   This may have changed:  Another medication with the same name was added. Make sure you understand how and when to take each.   Used for:  DDD (degenerative disc disease), cervical, Chronic pain syndrome        Dose:  1 tablet   Take 1 tablet by mouth every 6 hours as needed for pain #90 for three months supply.  Do not take with tramadol. (Patient only takes when tramadol does not work)    Quantity:  90 tablet   Refills:  0       * HYDROcodone-acetaminophen 5-325 MG per tablet   Commonly known as:  NORCO   This may have changed:  You were already taking a medication with the same name, and this prescription was added. Make sure you understand how and when to take each.   Used for:  Malignant neoplasm of overlapping sites of bladder (H)        Dose:  1-2 tablet   Take 1-2 tablets by mouth every 6 hours as needed for moderate to severe pain (Do NOT drive or use additional narcotics/tylenol while on this medication. Narcotics can cause constipation so please use theprescribed stool softener while on this medication.)   Quantity:  21 tablet   Refills:  0       * Notice:  This list has 2 medication(s) that are the same as other medications prescribed for you. Read the directions carefully, and ask your doctor or other care provider to review them with you.      CONTINUE these medicines which have NOT CHANGED        Dose / Directions    aspirin 81 MG tablet   Used for:  CAD (coronary artery disease)        Dose:  81 mg   Take 1 tablet (81 mg) by mouth daily   Quantity:  100 tablet   Refills:  1       blood glucose monitoring lancets   Used for:  Type 2 diabetes, HbA1C goal < 8% (H)        Use to test blood sugar 1 times daily or as directed.   Quantity:  1 Box   Refills:  11       blood glucose monitoring meter device kit   Used for:  Type 2 diabetes, HbA1C goal < 8% (H)        Use to test blood sugar 1 times daily or as directed.   Quantity:  1 kit   Refills:  0       * blood glucose monitoring test strip   Commonly known as:  no brand specified   Used for:  Type 2 diabetes, HbA1C goal < 8% (H)        Dose:  1 strip   1 strip by In Vitro route daily Comfort curve test stripes   Quantity:  100 each   Refills:  3       * blood glucose monitoring test strip   Commonly known as:  ACCU-CHEK SMILEY PLUS   Used for:  Type 2 diabetes, HbA1C goal < 8% (H)        Use to test blood sugar 1 times daily or as  directed.   Quantity:  100 each   Refills:  11       clopidogrel 75 MG tablet   Commonly known as:  PLAVIX   Used for:  Coronary artery disease involving native heart, angina presence unspecified, unspecified vessel or lesion type        Dose:  75 mg   Take 1 tablet (75 mg) by mouth daily   Quantity:  90 tablet   Refills:  2       COENZYME Q-10 PO        Dose:  30 mg   Take 30 mg by mouth daily   Refills:  0       evolocumab 140 MG/ML prefilled autoinjector   Commonly known as:  REPATHA   Used for:  Statin intolerance        Dose:  140 mg   Inject 1 mL (140 mg) Subcutaneous every 14 days   Quantity:  2 each   Refills:  12       hydrocortisone 2.5 % cream   Commonly known as:  ANUSOL-HC   Used for:  Internal hemorrhoids        Place rectally 2 times daily as needed for hemorrhoids Do not apply for more that two weeks at a time   Quantity:  30 g   Refills:  2       IMDUR 30 MG 24 hr tablet   Generic drug:  isosorbide mononitrate        1-2 tabs daily   Refills:  0       levocetirizine 5 MG tablet   Commonly known as:  XYZAL   Used for:  Seasonal allergies        Dose:  5 mg   Take 1 tablet (5 mg) by mouth every evening   Quantity:  90 tablet   Refills:  2       levothyroxine 25 MCG tablet   Commonly known as:  SYNTHROID/LEVOTHROID   Used for:  Subclinical hypothyroidism        Dose:  25 mcg   Take 1 tablet (25 mcg) by mouth daily   Quantity:  90 tablet   Refills:  2       * lidocaine 5 % ointment   Commonly known as:  XYLOCAINE   Used for:  Chronic pain syndrome        Apply topically as needed for moderate pain   Quantity:  50 g   Refills:  3       * lidocaine 5 % Patch   Commonly known as:  LIDODERM   Used for:  DDD (degenerative disc disease), cervical, DDD (degenerative disc disease), lumbar        Dose:  1 patch   Place 1 patch onto the skin as needed Over painful areas   Quantity:  30 patch   Refills:  5       lisinopril 2.5 MG tablet   Commonly known as:  PRINIVIL/Zestril   Used for:  Essential hypertension  with goal blood pressure less than 140/90        TAKE ONE TABLET BY MOUTH ONCE DAILY IN THE EVENING   Quantity:  90 tablet   Refills:  0       LORazepam 0.5 MG tablet   Commonly known as:  ATIVAN   Used for:  Anxiety, Chronic pain syndrome        Dose:  0.5-1 mg   Take 1-2 tablets (0.5-1 mg) by mouth nightly as needed Take 1-2 tabs by mouth as needed for anxiety and or sleeping-   Quantity:  60 tablet   Refills:  5       metFORMIN 500 MG 24 hr tablet   Commonly known as:  GLUCOPHAGE-XR   Used for:  Type 2 diabetes mellitus without complication, without long-term current use of insulin (H)        Dose:  1000 mg   Take 2 tablets (1,000 mg) by mouth 2 times daily (with meals)   Quantity:  360 tablet   Refills:  1       metoprolol 50 MG tablet   Commonly known as:  LOPRESSOR   Used for:  Hypertension goal BP (blood pressure) < 140/90        Dose:  50 mg   Take 1 tablet (50 mg) by mouth 2 times daily   Quantity:  180 tablet   Refills:  1       Multi-vitamin Tabs tablet        Dose:  1 tablet   Take 1 tablet by mouth daily   Refills:  0       * nitroGLYcerin 0.4 MG sublingual tablet   Commonly known as:  NITROSTAT   Used for:  Chronic ischemic heart disease, unspecified        1 TAB EVERY 5 MIN AS NEEDED, UP TO 3 PER EPISODE - Pt may  4 bottles of 25 at a time   Quantity:  100 tablet   Refills:  3       * nitroGLYcerin 0.4 MG/HR 24 hr patch   Commonly known as:  NITRODUR   Used for:  Chronic atrial fibrillation (H)        Dose:  1 patch   Place 1 patch onto the skin daily   Quantity:  30 patch   Refills:  12       RESTASIS 0.05 % ophthalmic emulsion   Used for:  Malignant neoplasm of overlapping sites of bladder (H), History of prostate cancer, History of MI (myocardial infarction)   Generic drug:  cycloSPORINE        Dose:  1 drop   Place 1 drop into both eyes 2 times daily   Refills:  0       tiZANidine 2 MG tablet   Commonly known as:  ZANAFLEX   Used for:  Chronic pain syndrome, Pain in joint, multiple sites         Dose:  1-2 mg   Take 0.5-1 tablets (1-2 mg) by mouth 2 times daily as needed for muscle spasms   Quantity:  30 tablet   Refills:  1       traMADol-acetaminophen 37.5-325 MG per tablet   Commonly known as:  ULTRACET   Used for:  DDD (degenerative disc disease), cervical        Two every morning and two at dinnertime as needed , discussed/knows not to use with vicodin since both had acetaminophen and would be over-sedating.   Quantity:  120 tablet   Refills:  2       * Notice:  This list has 6 medication(s) that are the same as other medications prescribed for you. Read the directions carefully, and ask your doctor or other care provider to review them with you.         Where to get your medicines      These medications were sent to [facility] [address] [address]     Phone:  [phone]     acetaminophen 325 MG tablet    sulfamethoxazole-trimethoprim 800-160 MG per tablet    tolterodine 4 MG 24 hr capsule         Some of these will need a paper prescription and others can be bought over the counter. Ask your nurse if you have questions.     Bring a paper prescription for each of these medications     HYDROcodone-acetaminophen 5-325 MG per tablet    senna-docusate 8.6-50 MG per tablet               ANTIBIOTIC INSTRUCTION     You've Been Prescribed an Antibiotic - Now What?  Your healthcare team thinks that you or your loved one might have an infection. Some infections can be treated with antibiotics, which are powerful, life-saving drugs. Like all medications, antibiotics have side effects and should only be used when necessary. There are some important things you should know about your antibiotic treatment.      Your healthcare team may run tests before you start taking an antibiotic.    Your team may take samples (e.g., from your blood, urine or other areas) to run tests to look for bacteria. These test can be important to determine  if you need an antibiotic at all and, if you do, which antibiotic will work best.      Within a few days, your healthcare team might change or even stop your antibiotic.    Your team may start you on an antibiotic while they are working to find out what is making you sick.    Your team might change your antibiotic because test results show that a different antibiotic would be better to treat your infection.    In some cases, once your team has more information, they learn that you do not need an antibiotic at all. They may find out that you don't have an infection, or that the antibiotic you're taking won't work against your infection. For example, an infection caused by a virus can't be treated with antibiotics. Staying on an antibiotic when you don't need it is more likely to be harmful than helpful.      You may experience side effects from your antibiotic.    Like all medications, antibiotics have side effects. Some of these can be serious.    Let you healthcare team know if you have any known allergies when you are admitted to the hospital.    One significant side effect of nearly all antibiotics is the risk of severe and sometimes deadly diarrhea caused by Clostridium difficile (C. Difficile). This occurs when a person takes antibiotics because some good germs are destroyed. Antibiotic use allows C. diificile to take over, putting patients at high risk for this serious infection.    As a patient or caregiver, it is important to understand your or your loved one's antibiotic treatment. It is especially important for caregivers to speak up when patients can't speak for themselves. Here are some important questions to ask your healthcare team.    What infection is this antibiotic treating and how do you know I have that infection?    What side effects might occur from this antibiotic?    How long will I need to take this antibiotic?    Is it safe to take this antibiotic with other medications or supplements  (e.g., vitamins) that I am taking?     Are there any special directions I need to know about taking this antibiotic? For example, should I take it with food?    How will I be monitored to know whether my infection is responding to the antibiotic?    What tests may help to make sure the right antibiotic is prescribed for me?      Information provided by:  www.cdc.gov/getsmart  U.S. Department of Health and Human Services  Centers for disease Control and Prevention  National Center for Emerging and Zoonotic Infectious Diseases  Division of Healthcare Quality Promotion         Protect others around you: Learn how to safely use, store and throw away your medicines at www.disposemymeds.org.             Medication List: This is a list of all your medications and when to take them. Check marks below indicate your daily home schedule. Keep this list as a reference.      Medications           Morning Afternoon Evening Bedtime As Needed    acetaminophen 325 MG tablet   Commonly known as:  TYLENOL   Take 1-2 tablets (325-650 mg) by mouth every 6 hours as needed for other (mild pain)                                aspirin 81 MG tablet   Take 1 tablet (81 mg) by mouth daily                                blood glucose monitoring lancets   Use to test blood sugar 1 times daily or as directed.                                blood glucose monitoring meter device kit   Use to test blood sugar 1 times daily or as directed.                                * blood glucose monitoring test strip   Commonly known as:  no brand specified   1 strip by In Vitro route daily Comfort curve test stripes                                * blood glucose monitoring test strip   Commonly known as:  ACCU-CHEK SMILEY PLUS   Use to test blood sugar 1 times daily or as directed.                                clopidogrel 75 MG tablet   Commonly known as:  PLAVIX   Take 1 tablet (75 mg) by mouth daily                                COENZYME Q-10 PO   Take 30  mg by mouth daily                                DULoxetine 20 MG EC capsule   Commonly known as:  CYMBALTA   TAKE ONE CAPSULE BY MOUTH TWICE DAILY                                evolocumab 140 MG/ML prefilled autoinjector   Commonly known as:  REPATHA   Inject 1 mL (140 mg) Subcutaneous every 14 days                                * HYDROcodone-acetaminophen 5-325 MG per tablet   Commonly known as:  NORCO   Take 1 tablet by mouth every 6 hours as needed for pain #90 for three months supply.  Do not take with tramadol. (Patient only takes when tramadol does not work)                                * HYDROcodone-acetaminophen 5-325 MG per tablet   Commonly known as:  NORCO   Take 1-2 tablets by mouth every 6 hours as needed for moderate to severe pain (Do NOT drive or use additional narcotics/tylenol while on this medication. Narcotics can cause constipation so please use theprescribed stool softener while on this medication.)                                hydrocortisone 2.5 % cream   Commonly known as:  ANUSOL-HC   Place rectally 2 times daily as needed for hemorrhoids Do not apply for more that two weeks at a time                                IMDUR 30 MG 24 hr tablet   1-2 tabs daily   Generic drug:  isosorbide mononitrate                                levocetirizine 5 MG tablet   Commonly known as:  XYZAL   Take 1 tablet (5 mg) by mouth every evening                                levothyroxine 25 MCG tablet   Commonly known as:  SYNTHROID/LEVOTHROID   Take 1 tablet (25 mcg) by mouth daily                                * lidocaine 5 % ointment   Commonly known as:  XYLOCAINE   Apply topically as needed for moderate pain                                * lidocaine 5 % Patch   Commonly known as:  LIDODERM   Place 1 patch onto the skin as needed Over painful areas                                lisinopril 2.5 MG tablet   Commonly known as:  PRINIVIL/Zestril   TAKE ONE TABLET BY MOUTH ONCE DAILY IN THE EVENING                                 LORazepam 0.5 MG tablet   Commonly known as:  ATIVAN   Take 1-2 tablets (0.5-1 mg) by mouth nightly as needed Take 1-2 tabs by mouth as needed for anxiety and or sleeping-                                metFORMIN 500 MG 24 hr tablet   Commonly known as:  GLUCOPHAGE-XR   Take 2 tablets (1,000 mg) by mouth 2 times daily (with meals)                                metoprolol 50 MG tablet   Commonly known as:  LOPRESSOR   Take 1 tablet (50 mg) by mouth 2 times daily                                Multi-vitamin Tabs tablet   Take 1 tablet by mouth daily                                * nitroGLYcerin 0.4 MG sublingual tablet   Commonly known as:  NITROSTAT   1 TAB EVERY 5 MIN AS NEEDED, UP TO 3 PER EPISODE - Pt may  4 bottles of 25 at a time                                * nitroGLYcerin 0.4 MG/HR 24 hr patch   Commonly known as:  NITRODUR   Place 1 patch onto the skin daily                                RESTASIS 0.05 % ophthalmic emulsion   Place 1 drop into both eyes 2 times daily   Generic drug:  cycloSPORINE                                senna-docusate 8.6-50 MG per tablet   Commonly known as:  SENOKOT-S;PERICOLACE   Take 1-2 tablets by mouth 2 times daily To prevent constipation while taking narcotic pain medication. Start with 1 tablet twice daily. If no bowel movement in 24 hours, increase to 2 tablets twice daily.  Discontinue if you have loose stools or when you are no longer taking narcotics.                                sulfamethoxazole-trimethoprim 800-160 MG per tablet   Commonly known as:  BACTRIM DS/SEPTRA DS   Take 1 tablet by mouth 2 times daily for 3 days                                tiZANidine 2 MG tablet   Commonly known as:  ZANAFLEX   Take 0.5-1 tablets (1-2 mg) by mouth 2 times daily as needed for muscle spasms                                tolterodine 4 MG 24 hr capsule   Commonly known as:  DETROL LA   Take 1 capsule (4 mg) by mouth daily as needed For  bladder spasm/pain                                traMADol-acetaminophen 37.5-325 MG per tablet   Commonly known as:  ULTRACET   Two every morning and two at dinnertime as needed , discussed/knows not to use with vicodin since both had acetaminophen and would be over-sedating.                                * Notice:  This list has 8 medication(s) that are the same as other medications prescribed for you. Read the directions carefully, and ask your doctor or other care provider to review them with you.              More Information        Catheter-Associated Urinary Tract Infections     A small balloon keeps the catheter in place inside the bladder.   A catheter-associated urinary tract infection (CAUTI) is an infection of the urinary system. CAUTI is caused by bacteria that enter the urinary tract when a urinary catheter is used. This is a tube that s placed into the bladder to drain urine.  The urinary system  This system includes the kidneys, ureters, bladder, and urethra. The kidneys filter blood and make urine. The ureters carry urine from the kidneys to the bladder. The bladder stores urine. The urethra carries urine from the bladder to the outside of the body.  What is a urinary catheter?  A urinary catheter is a thin, flexible tube. It is placed in the bladder to drain urine. Urine flows through the tube into a collecting bag outside of the body. There are different types of urinary catheters. The most common type is an indwelling catheter. This is also known as a urethral catheter. This is because it s placed into the bladder through the urethra. This catheter is also called a Muhammad catheter.  Why is a urinary catheter needed?  A urinary catheter is needed for any of the following:    You can t get up to use the toilet because your mobility is limited. This may be due to surgery, an injury, or illness.    You have a blockage in your urinary system.    Your healthcare provider needs to measure the amount of  urine you pass.    The function of your kidneys and bladder is being tested.    You re not able to control your bladder (incontinence).  In most cases, the urinary catheter is temporary. You'll need it only until the problem that requires it is resolved.   How does a CAUTI develop?  Bacteria can enter the urinary tract as the catheter is put into the urethra. Bacteria can also get into the urinary tract while the catheter is in place. The common bacteria that cause a CAUTI are ones that live in the intestine. These bacteria don t normally cause problems in the intestine. But when they get into the urinary tract, a CAUTI can result.  Why is a CAUTI of concern?  Left untreated, a CAUTI can lead to health problems. These problems may include bladder infection, prostate infection, and kidney infection. A CAUTI can prolong your hospital stay. If the infection is not treated in time, serious health complications may occur.  What are the symptoms of a CAUTI?    A burning feeling, pressure, or pain in your lower abdomen    Fever or chills    Urine in the collecting bag is cloudy or bloody (pink or red)    Burning feeling in the urethra or genital area    Aching in the back (kidney area)    Nausea and vomiting    Person is confused, or is not alert, or has a change in behavior (mainly affects older patients)    Note that sometimes a person won t have any symptoms but may still have a CAUTI.  Tell a healthcare provider right away if you or your loved one has any of these symptoms.  How is CAUTI diagnosed?  If you have symptoms of CAUTI your healthcare provider will order tests. These include a urine test, blood tests, and other tests as needed.  How is CAUTI treated?   Treatment may involve any of the following:    Antibiotics. Your healthcare provider will likely prescribe antibiotics if you have symptoms. Be aware that if you don t have symptoms, you may not be given antibiotics. This is to prevent an increase in bacteria  that resist (can t be killed by) certain antibiotics.    Removing the catheter. The catheter will be removed when your healthcare provider decides it s no longer needed. This usually helps stop the infection.    Changing the catheter. If you still need a catheter, the old one will be removed. A new one will be put in. This may help stop the infection.  How do hospital and long-term facility staff prevent CAUTI?  To keep patients from getting a CAUTI, the staff follow certain procedures:    Prescribe a catheter only when it s needed. It is removed as soon as it s no longer needed.    Use sterile (clean) technique when placing the catheter into the urinary tract. This means before putting the catheter in, the caregiver washes his or her hands with soap and water. He or she then puts on sterile gloves. A sterile catheter kit that has cleansers is used to cleanse the patient s genital area.    Before performing catheter care, caregivers also wash their hands or use an alcohol-based hand cleanser.    Hang the bag lower than your bladder. This prevents urine from flowing back into your bladder.    Ensure that the bag is emptied regularly.  What you can do as a patient to prevent CAUTI  You can help prevent yourself from getting a CAUTI by doing the following:    Every day ask your healthcare provider how long you need to have the catheter. The longer you have a catheter, the higher your chance of getting a CAUTI.    If a caregiver doesn t clean his or her hands and put on gloves before touching your catheter, ask them to do so.    If you ve been taught how to care for your catheter, be sure to wash your hands before and after each session.    Make sure your bag is lower than your bladder. If it s not, tell your caregiver.    Don t disconnect the catheter and drain tube. Doing so allows germs to get into the catheter.    Cleansing of the genital and perineal areas is very important to help decrease bacteria in areas  surrounding the catheter. Ask your doctor what you should use and how often to clean these areas.  If you are discharged with an indwelling catheter    Before you leave the hospital, make sure you understand the instructions on how to care for your catheter at home.    Ask your healthcare provider how long you need the catheter. Also ask if you need to make a follow-up appointment to have the catheter removed.    Always use sterile (clean) technique when caring for your catheter. Wash your hands before and after doing any catheter care.    Call your healthcare provider right away if you develop symptoms of a CAUTI (see above).     Date Last Reviewed: 1/1/2017 2000-2017 [employer]. [address] [place] [address], [place] [id number]. All rights reserved. This information is not intended as a substitute for professional medical care. Always follow your healthcare professional's instructions.                Emptying and Cleaning Your Urinary Catheter Bag  You have an indwelling urinary catheter. This drains urine from your bladder into a bag. The bag can be one that is used at the bedside. Or it can be a smaller bag that is strapped to the leg. Follow the numbered steps below to empty and clean a urinary bag.       Drain Clean tube Clean catheter   Step 1. Drain the bag    Wash your hands well with soap and water to prevent infecting the urinary catheter and bag.    If the short drainage tube is inserted into a pocket on the bag, take the drainage tube out of the pocket.    Hold the drainage tube over a toilet or measuring container. Open the valve.    Don t touch the tip of the valve or let it touch the toilet or container.    Wash your hands again.  Step 2. Clean the drainage tube    When the bag is empty, clean the tip of the drainage valve with an alcohol wipe.    Close the valve.    Reinsert the drainage tube into the pocket, if there is one.  Step 3. Clean your skin    Wash your hands well before and  after cleaning your skin.    If you have a catheter (such as a Muhammad) that enters through the urethra, clean the urethral area with soap and water 1 time(s) daily as you were taught by your healthcare provider. You should also clean after every bowel movement to prevent infection.    Avoid pulling on the tubing when cleaning so you don t injure the urethra.    Don t apply antibiotic ointment or any other antibacterial product to the urethra.    Don t use lubricant on the urethra.    Don t apply powder to the genital area or to the tubing.    If you have a suprapubic catheter  (one that was surgically placed into the bladder through the lower abdomen), your healthcare provider will tell you how to clean your skin around the catheter.  Step 4. Check and clean the catheter tubing    Check the tubing. If there are kinks, cracks, clogs, or you can t see into the tubing, you ll need to change to new tubing as you were shown by your healthcare provider.    If the current tubing can still be used, wash it with soap and water. Always wash the tubing in the direction away from your body. Avoid pulling on the tubing.    Dry the tubing with a clean washcloth or paper towel.  Step 5. Clean the drainage bag    Clean the drainage bag once every 3 days.    Have a clean backup bag or other drainage device ready.    Follow these steps:    Wash your hands well with soap and water.    Disconnect the bag from the catheter tubing. Connect the tubing to the backup bag or drainage device.    Drain any remaining urine from the bag you just disconnected. Close the drainage valve.    Pour some warm soapy water into the bag. Swish the soap around, being sure to get the corners of the bag.    Open the drainage valve to drain the soap. Close the valve.    Fill the bag with 2 parts vinegar and 3 parts water. Shake the solution a bit and allow it to remain in the bag for 30 minutes.    Drain the vinegar solution and rinse the bag with cold tap  water.    Hang the bag to drain and air-dry.  When to call your healthcare provider  Call your healthcare provider right away if you have any of the following:    Little or no urine flowing into the bag    Urine leaking where the catheter enters the body    Pain, burning, or redness of the area where the catheter enters the body    Bloody urine (a trace of blood is normal)    Cloudy or foul-smelling urine, or sand-like grains in your urine    Pain in your lower back or lower abdomen    Your catheter falls out    Fever of 100.4 F (38 C) or higher, or shaking chills   Date Last Reviewed: 1/1/2017 2000-2017 [employer]. [address] [place] [address], [place] [address]. All rights reserved. This information is not intended as a substitute for professional medical care. Always follow your healthcare professional's instructions.                Discharge Instructions: Caring for Your Leg Bag  You are going home with a urinary catheter and collection device (drainage bag) in place. One type of collection device is called a leg bag. This is a smaller drainage bag that you can wear on your leg to collect urine during the day. The bag can fit under your clothing. You can move around with greater ease when using a leg bag instead of a larger collection bag.  You were shown how to care for your catheter in the hospital. This sheet will help you remember those steps when you are at home.  Home care    Wash your hands thoroughly before and after you care for your catheter or collection device.    Gather your supplies:    Alcohol wipes    Soap and water    Towel and washcloth    Leg strap and leg bag    Use soap and water to wash the area where your catheter enters your body. Rinse well.    Secure the bag hernandez to your leg:    Position the leg band high on your thigh with the product label pointing away from your leg.    Stretch the leg band in place and fasten.    Place the catheter tubing over the bag and secure it.  You may secure it with a Velcro tab or other method, depending on the product you use. Be sure to leave enough loop in the catheter above the leg band to avoid pulling on the tube.    Every 4 to 6 hours, reposition the band to prevent pressure from the elastic on your leg. You can do this by changing the bag to the other leg or by raising or lowering the leg band.    Wash the band as frequently as needed. You can hand wash and dry the leg band.    Place the bag in the bag hernandez.    Clean the urine bag end of the catheter and your catheter port with an alcohol wipe.    Place a towel under the bag and port to keep urine from dripping onto your leg.    Before connecting the outlet valve at the bottom of the bag to the catheter, make sure that it is firmly closed. Flip the valve upward toward the bag; it needs to snap firmly in place. Be sure not to tug on the tubing. Be gentle.    Attach the urine bag to the end of the catheter; insert the connector snugly into the catheter port. You can avoid dribbling urine by bending the catheter tubing just below the tip and holding it while you disconnect it from the catheter. Be careful to keep the tip clean while connecting the leg bag tubing to the catheter--this keeps germs from getting into the system.    Drain the bag when it is full. To drain the bag, flip the clamp downward. Direct the flexible outlet tube to control the flow of urine. You don t have to disconnect the leg bag from the catheter to empty it. Raise your leg up to the edge of the toilet to reach the leg bag. Then you can empty the bag directly into the toilet. This way, you won t need to bend over, which may be uncomfortable.    Keep the leg bag clean. Rinse daily with equal parts water and vinegar to reduce odor and keep the bag free of germs.    Remember to keep the drainage bag below the level of your bladder for proper drainage.  Follow-up  Make a follow-up appointment as directed by your healthcare  provider.  When to call your healthcare provider  Call your healthcare provider right away if you have any of the following:    Redness, swelling, or warmth around the catheter entry site    Pus draining from your catheter entry site or into the catheter tubing and bag    Blood, clots, or floating debris in the urine    Nausea and vomiting    Shaking chills    Fever above 100.4 F (38 C)    Pain that is not relieved by medicine     Catheter that falls out or is dislodged   Date Last Reviewed: 1/1/2017 2000-2017 [employer]. [address] [place], [place] [id number]. All rights reserved. This information is not intended as a substitute for professional medical care. Always follow your healthcare professional's instructions.                Discharge Instructions: Caring for Your Indwelling Urinary Catheter  You have been discharged with an indwelling urinary catheter (also called a Muhammad catheter). A catheter is a thin, flexible tube. An indwelling urinary catheter has two parts. The first part is a tube that drains urine from your bladder. The second part is a bag or other device that collects the urine.  The most important thing to remember is that you want to prevent infection. Always wash your hands before handling your catheter bag or tubing.  Draining the bedside bag    Wash your hands with soap and clean, running water or use an alcohol-based hand  that contains at least 60% alcohol.    Hold the drainage tube over a toilet or measuring container.    Unclamp the tube and let the bag drain.    Don t touch the tip of the drainage tube or let it touch the toilet or container.  Cleaning the drainage tube    When the bag is empty, clean the tip of the drainage tube with an alcohol wipe.    Clamp the tube.    Reinsert the tube into the pocket on the drainage bag.  Cleaning your skin and tubing    Clean the skin near the catheter with soap and water.    Wash your genital area from front to  back.    Wash the catheter tubing. Always wash the catheter in the direction away from your body.    You will be told when and how to change your bag and tubing.    Don t try to remove the catheter by yourself.    You may shower with the catheter in place.  Emptying a leg bag    Wash your hands.    Remove the stopper on the bag.    Drain the bag into the toilet or a measuring container. Don t let the tip of the drainage tube touch anything, including your fingers.    Clean the tip of the drainage tube with alcohol.    Replace the stopper.  Follow-up care  Make a follow-up appointment as directed by your healthcare provider  When to call your healthcare provider  Call your healthcare provider right away if you have any of the following:    Chills or fever above 100.4 F (38 C)    Leakage around the catheter insertion site    Increased spasms (uncontrollable twitching) in your legs, abdomen, or bladder. Occasional mild spasms are normal.    Burning in the urinary tract, penis, or genital area    Nausea and vomiting    Aching in the lower back    Cloudy or bloody (pink or red) urine, sediment or mucus in the urine, or foul-smelling urine   Date Last Reviewed: 1/1/2017 2000-2017 [employer]. [address] [place] [address]. All rights reserved. This information is not intended as a substitute for professional medical care. Always follow your healthcare professional's instructions.                Muhammad Catheter Care    A Muhammad catheter is a rubber tube that is placed through the urethra (opening where urine comes out) and into the bladder. This helps drain urine from the bladder. There is a small balloon on the end of the tube that is inflated after insertion. This keeps the catheter from sliding out of the bladder.  A Muhammad catheter is used to treat urinary retention (unable to pass urine). It is also used when there is incontinence (loss of bladder control).  Home care    Finish taking any  prescribed antibiotic even if you are feeling better before then.    It is important to keep bacteria from getting into the collection bag. Do not disconnect the catheter from the collection bag.    Use a leg band to secure the drainage tube, so it does not pull on the catheter. Drain the collection bag when it becomes full using the drain spout at the bottom of the bag.    Do not try to pull or remove your catheter. This will injure your urethra. It must be removed by your healthcare provider or nurse.  Follow-up care  Follow up with your healthcare provider as advised for repeat urine testing and catheter removal or replacement.  When to seek medical advice  Call your healthcare provider right away if any of these occur:    Fever of 100.4 F (38 C) or higher, or as directed by your healthcare provider    Bladder pain or fullness    Abdominal swelling, nausea or vomiting, or back pain    Blood or urine leakage around the catheter    Bloody urine coming from the catheter (if a new symptom)    Catheter falls out    Catheter stops draining for 6 hours    Weakness, dizziness, or fainting  Date Last Reviewed: 10/1/2016    5833-6891 [employer]. [address] [place] [address]. All rights reserved. This information is not intended as a substitute for professional medical care. Always follow your healthcare professional's instructions.                Muhammad Catheter Removal     The syringe is put into the balloon port to allow the balloon to empty. Once the balloon is empty, the catheter can be removed.   Your healthcare provider has instructed you to remove your Muhammad catheter. This is a thin, flexible tube that allows urine to drain out of your bladder and into a bag. It s important to properly remove your catheter to help prevent infection and other complications. If you have any questions about removing the Muhammad catheter, ask your healthcare provider before trying to remove it. Otherwise, follow  the instructions on this sheet.  Muhammad catheter  The Muhammad catheter is held in place by a small balloon that s filled with water. To remove the catheter, you must first drain the water from the balloon. This is done using a syringe and the balloon port. This is the opening in the catheter that isn t attached to the bag. It allows you to get to the balloon.  Instructions for removing the catheter  Follow the directions closely. Note: If the catheter doesn t come out with gentle pulling, stop and call your healthcare provider right away.    Empty the bag of urine if needed.    Wash your hands with soap and warm water. Dry them well.    Gather your supplies. This includes a syringe, wastebasket, a towel, and a syringe that was given to you by your healthcare provider.    Put the syringe into the balloon port on the catheter. The syringe fits tightly into the port with a firm push and twist motion.    Wait as the water from the balloon empties into the syringe. Depending on how large the balloon is, you may need to repeat this process several times until all of the water is out of the balloon.    Once the balloon is emptied, gently pull out the catheter.    Put the used catheter in the wastebasket. Also throw away the syringe.    Use the towel to wipe up any spilled water or urine if needed.    Wash your hands again.  When to call your healthcare provider  Call the healthcare provider right away if:    You have a fever of 100.4 F (38 C) or higher, or as directed by your healthcare provider.    You have questions about removing the catheter.    The catheter doesn t come out with gentle pulling.    You can t urinate within 8 hours after removing the catheter.    Your belly (abdomen) is painful or bloated.    You have burning pain with urination that lasts for 24 hours.    You see a lot of blood in the urine. Light bleeding for 24 hours is normal.    It feels like the bladder is not emptying.   Date Last Reviewed:  12/1/2016 2000-2017 [employer]. [address] [place] [address], [place] [address]. All rights reserved. This information is not intended as a substitute for professional medical care. Always follow your healthcare professional's instructions.

## 2017-12-08 NOTE — ANESTHESIA CARE TRANSFER NOTE
Patient: Bora Metzger    Procedure(s):   cystoscopy, bladder biopsy,  bilateral ureter washings. - Wound Class: II-Clean Contaminated    Diagnosis: Malignant Neoplasm Of Overlapping Sites Of Bladder   Diagnosis Additional Information: No value filed.    Anesthesia Type:   No value filed.     Note:  Airway :Face Mask  Patient transferred to:PACU  Comments: Pt. Pink and breathing spontaneously.  Vitals stable.  Report given to oncoming nurse.  Transfer of care occurred.Handoff Report: Identifed the Patient, Identified the Reponsible Provider, Reviewed the pertinent medical history, Discussed the surgical course, Reviewed Intra-OP anesthesia mangement and issues during anesthesia, Set expectations for post-procedure period and Allowed opportunity for questions and acknowledgement of understanding      Vitals: (Last set prior to Anesthesia Care Transfer)    CRNA VITALS  12/8/2017 0922 - 12/8/2017 1022      12/8/2017             Pulse: 74    Ht Rate: (!)  45    SpO2: 100 %    Resp Rate (observed): (!)  7                Electronically Signed By: LEENA Maria CRNA  December 8, 2017  11:34 AM

## 2017-12-08 NOTE — OR NURSING
Report received from Clive Robertson RN. Patient alert, drinking water, thinks pain is improving.

## 2017-12-11 NOTE — TELEPHONE ENCOUNTER
RN called patient's wife back in response to a voicemail regarding prior authorization. RN informed wife the insurance company had approved [employer] and the approval letter was faxed to [facility] on 12/8/17. RN spoke with an [employer] [facility] Representative and was informed it may take a few days to process once the prior authorization letter is received. RN relayed this information to the patient's wife. Informed wife RN will let her know when it has been approved by [employer]. Wife stated patient has drug for the month of December.

## 2017-12-11 NOTE — NURSING NOTE
Bora Metzger comes into clinic today at the request of Dr Argueta Ordering Provider for TOV (trial of void).    Bora Metzger presented today for a trial of void.   Approximately 100 mL of normal saline instilled into bladder via catheter.  Patient stated He had urge to urinate and catheter was removed without difficulty.  Patient was given a urinal to measure urine output.  Patient voided approximately 125 mL of pink urine.   Patient did tolerate procedure well.   Ciprofloxacin 500 mg given per protocol.  Teaching done with patient verbally as where to call or go if pain, fever, or unable to urinate post catheter removal.    Abran Aiken RN  12/11/2017  10:57 AM        This service provided today was under the supervising provider of the day Dr Hernandez, who was available if needed.    Abran Aiken

## 2017-12-11 NOTE — PATIENT INSTRUCTIONS
Call clinic with any questions or problems and return to see Dr. Argueta for your scheduled follow up.    It was a pleasure meeting with you today.  Thank you for allowing me and my team the privilege of caring for you today.  YOU are the reason we are here, and I truly hope we provided you with the excellent service you deserve.  Please let us know if there is anything else we can do for you so that we can be sure you are leaving completely satisfied with your care experience.       Abran Aiken LPN

## 2017-12-12 NOTE — TELEPHONE ENCOUNTER
Pt with a history of bladder cancer coming in to review pathology from a recent bladder biopsy. Pathology is processing. No need for a call.

## 2017-12-12 NOTE — ED AVS SNAPSHOT
Emergency Department    [address] [place] [address]    Phone:  [phone]    Fax:  [phone]                                       Bora Metzger   MRN: [id number]    Department:   Emergency Department   Date of Visit:  12/12/2017           After Visit Summary Signature Page     I have received my discharge instructions, and my questions have been answered. I have discussed any challenges I see with this plan with the nurse or doctor.    ..........................................................................................................................................  Patient/Patient Representative Signature      ..........................................................................................................................................  Patient Representative Print Name and Relationship to Patient    ..................................................               ................................................  Date                                            Time    ..........................................................................................................................................  Reviewed by Signature/Title    ...................................................              ..............................................  Date                                                            Time

## 2017-12-12 NOTE — ED AVS SNAPSHOT
Emergency Department    [address] [facility] [address]    Phone:  [phone]    Fax:  [phone]                                       Bora Metzger   MRN: [id number]    Department:   Emergency Department   Date of Visit:  12/12/2017           Patient Information     Date Of Birth          1/15/1931        Your diagnoses for this visit were:     Urinary retention Postoperative       You were seen by Lamonte Burk MD.      Follow-up Information     Follow up with Blanca Argueta MD. Call in 1 day.    Specialty:  Urology    Contact information:    [address] [facility] [address]  [facility] [address] [phone]          Discharge Instructions         Urinary Retention (Male)  Urinary retention is the medical term for difficulty or inability to pass urine, even though your bladder is full.  Causes  The most common cause of urinary retention in men is the bladder outlet being blocked. This can be due to an enlarged prostate gland or a bladder infection. Certain medicines can also cause this problem. This condition is more likely to occur as men get older.    This condition is treated by insertion of a catheter into the bladder to drain the urine. This provides immediate relief. The catheter may need to remain in place for a few days to prevent a recurrence. The catheter has a balloon on the tip which was inflated after insertion. This prevents the catheter from falling out.  Symptoms  Common symptoms of urinary retention include:    Pain (not experienced by everyone)    Frequent urination    Feeling that the bladder is still full after urinating    Incontinence (not being able to control the release of urine)    Swollen abdomen  Treatment  This condition is treated by inserting a tube (catheter) into the bladder to drain the urine. This provides immediate relief. The catheter may need to stay in place for a few days. The catheter has a balloon on the tip, which is inflated after  insertion. This prevents the catheter from falling out.  Home care    If you were given antibiotics, take them until they are used up, or your healthcare provider tells you to stop. It is important to finish the antibiotics even though you feel better. This is to make sure your infection has cleared.    If a catheter was left in place, it is important to keep bacteria from getting into the collection bag. Do not disconnect the catheter from the collection bag.    Use a leg band to secure the drainage tube, so it does not pull on the catheter. Drain the collection bag when it becomes full using the drain spout at the bottom of the bag.    Do not pull on or try to remove your catheter. This will injure your urethra. The catheter must be removed by a healthcare provider.  Follow-up care  Follow up with your healthcare provider, or as advised.  If a catheter was left in place, it can usually be removed within 3 to 7 days. Some conditions require the catheter to stay in longer. Your healthcare provider will tell you when to return to have the catheter removed.  When to seek medical advice  Call your healthcare provider right away if any of these occur:    Fever of 100.4 F (38 C) or higher, or as directed by your healthcare provider    Bladder or lower-abdominal pain or fullness    Abdominal swelling, nausea, vomiting, or back pain    Blood or urine leakage around the catheter    Bloody urine coming from the catheter (if a new symptom)    Weakness, dizziness, or fainting    Confusion or change in usual level of alertness    If a catheter was left in place, return if:    Catheter falls out    Catheter stops draining for 6 hours  Date Last Reviewed: 7/26/2015 2000-2017 [employer]. [address] [place] [address], [place] [address]. All rights reserved. This information is not intended as a substitute for professional medical care. Always follow your healthcare professional's instructions.          Future  Appointments        Provider Department Dept Phone Center    12/20/2017 10:00 AM Blanca Argueta MD [facility] [phone] [facility]      24 Hour Appointment Hotline       To make an appointment at any [facility], call [phone] ([phone]). If you don't have a family doctor or clinic, we will help you find one. [facility] are conveniently located to serve the needs of you and your family.             Review of your medicines      Our records show that you are taking the medicines listed below. If these are incorrect, please call your family doctor or clinic.        Dose / Directions Last dose taken    acetaminophen 325 MG tablet   Commonly known as:  TYLENOL   Dose:  325-650 mg   Quantity:  30 tablet        Take 1-2 tablets (325-650 mg) by mouth every 6 hours as needed for other (mild pain)   Refills:  0        aspirin 81 MG tablet   Dose:  81 mg   Quantity:  100 tablet        Take 1 tablet (81 mg) by mouth daily   Refills:  1        blood glucose monitoring lancets   Quantity:  1 Box        Use to test blood sugar 1 times daily or as directed.   Refills:  11        blood glucose monitoring meter device kit   Quantity:  1 kit        Use to test blood sugar 1 times daily or as directed.   Refills:  0        * blood glucose monitoring test strip   Commonly known as:  no brand specified   Dose:  1 strip   Quantity:  100 each        1 strip by In Vitro route daily Comfort curve test stripes   Refills:  3        * blood glucose monitoring test strip   Commonly known as:  ACCU-CHEK SMILEY PLUS   Quantity:  100 each        Use to test blood sugar 1 times daily or as directed.   Refills:  11        clopidogrel 75 MG tablet   Commonly known as:  PLAVIX   Dose:  75 mg   Quantity:  90 tablet        Take 1 tablet (75 mg) by mouth daily   Refills:  2        COENZYME Q-10 PO   Dose:  30 mg        Take 30 mg by mouth daily   Refills:  0        DULoxetine 20  MG EC capsule   Commonly known as:  CYMBALTA   Quantity:  180 capsule        TAKE ONE CAPSULE BY MOUTH TWICE DAILY   Refills:  1        evolocumab 140 MG/ML prefilled autoinjector   Commonly known as:  REPATHA   Dose:  140 mg   Quantity:  2 each        Inject 1 mL (140 mg) Subcutaneous every 14 days   Refills:  12        * HYDROcodone-acetaminophen 5-325 MG per tablet   Commonly known as:  NORCO   Dose:  1 tablet   Quantity:  90 tablet        Take 1 tablet by mouth every 6 hours as needed for pain #90 for three months supply.  Do not take with tramadol. (Patient only takes when tramadol does not work)   Refills:  0        * HYDROcodone-acetaminophen 5-325 MG per tablet   Commonly known as:  NORCO   Dose:  1-2 tablet   Quantity:  21 tablet        Take 1-2 tablets by mouth every 6 hours as needed for moderate to severe pain (Do NOT drive or use additional narcotics/tylenol while on this medication. Narcotics can cause constipation so please use theprescribed stool softener while on this medication.)   Refills:  0        hydrocortisone 2.5 % cream   Commonly known as:  ANUSOL-HC   Quantity:  30 g        Place rectally 2 times daily as needed for hemorrhoids Do not apply for more that two weeks at a time   Refills:  2        IMDUR 30 MG 24 hr tablet   Generic drug:  isosorbide mononitrate        1-2 tabs daily   Refills:  0        levocetirizine 5 MG tablet   Commonly known as:  XYZAL   Dose:  5 mg   Quantity:  90 tablet        Take 1 tablet (5 mg) by mouth every evening   Refills:  2        levothyroxine 25 MCG tablet   Commonly known as:  SYNTHROID/LEVOTHROID   Dose:  25 mcg   Quantity:  90 tablet        Take 1 tablet (25 mcg) by mouth daily   Refills:  2        * lidocaine 5 % ointment   Commonly known as:  XYLOCAINE   Quantity:  50 g        Apply topically as needed for moderate pain   Refills:  3        * lidocaine 5 % Patch   Commonly known as:  LIDODERM   Dose:  1 patch   Quantity:  30 patch        Place 1 patch  onto the skin as needed Over painful areas   Refills:  5        lisinopril 2.5 MG tablet   Commonly known as:  PRINIVIL/Zestril   Quantity:  90 tablet        TAKE ONE TABLET BY MOUTH ONCE DAILY IN THE EVENING   Refills:  0        LORazepam 0.5 MG tablet   Commonly known as:  ATIVAN   Dose:  0.5-1 mg   Quantity:  60 tablet        Take 1-2 tablets (0.5-1 mg) by mouth nightly as needed Take 1-2 tabs by mouth as needed for anxiety and or sleeping-   Refills:  5        metFORMIN 500 MG 24 hr tablet   Commonly known as:  GLUCOPHAGE-XR   Dose:  1000 mg   Quantity:  360 tablet        Take 2 tablets (1,000 mg) by mouth 2 times daily (with meals)   Refills:  1        metoprolol 50 MG tablet   Commonly known as:  LOPRESSOR   Dose:  50 mg   Quantity:  180 tablet        Take 1 tablet (50 mg) by mouth 2 times daily   Refills:  1        Multi-vitamin Tabs tablet   Dose:  1 tablet        Take 1 tablet by mouth daily   Refills:  0        * nitroGLYcerin 0.4 MG sublingual tablet   Commonly known as:  NITROSTAT   Quantity:  100 tablet        1 TAB EVERY 5 MIN AS NEEDED, UP TO 3 PER EPISODE - Pt may  4 bottles of 25 at a time   Refills:  3        * nitroGLYcerin 0.4 MG/HR 24 hr patch   Commonly known as:  NITRODUR   Dose:  1 patch   Quantity:  30 patch        Place 1 patch onto the skin daily   Refills:  12        RESTASIS 0.05 % ophthalmic emulsion   Dose:  1 drop   Generic drug:  cycloSPORINE        Place 1 drop into both eyes 2 times daily   Refills:  0        senna-docusate 8.6-50 MG per tablet   Commonly known as:  SENOKOT-S;PERICOLACE   Dose:  1-2 tablet   Quantity:  30 tablet        Take 1-2 tablets by mouth 2 times daily To prevent constipation while taking narcotic pain medication. Start with 1 tablet twice daily. If no bowel movement in 24 hours, increase to 2 tablets twice daily.  Discontinue if you have loose stools or when you are no longer taking narcotics.   Refills:  0        tiZANidine 2 MG tablet   Commonly  known as:  ZANAFLEX   Dose:  1-2 mg   Quantity:  30 tablet        Take 0.5-1 tablets (1-2 mg) by mouth 2 times daily as needed for muscle spasms   Refills:  1        tolterodine 4 MG 24 hr capsule   Commonly known as:  DETROL LA   Dose:  4 mg   Quantity:  2 capsule        Take 1 capsule (4 mg) by mouth daily as needed For bladder spasm/pain   Refills:  0        traMADol-acetaminophen 37.5-325 MG per tablet   Commonly known as:  ULTRACET   Quantity:  120 tablet        Two every morning and two at dinnertime as needed , discussed/knows not to use with vicodin since both had acetaminophen and would be over-sedating.   Refills:  2        * Notice:  This list has 8 medication(s) that are the same as other medications prescribed for you. Read the directions carefully, and ask your doctor or other care provider to review them with you.      ASK your doctor about these medications        Dose / Directions Last dose taken    sulfamethoxazole-trimethoprim 800-160 MG per tablet   Commonly known as:  BACTRIM DS/SEPTRA DS   Dose:  1 tablet   Quantity:  6 tablet   Ask about: Should I take this medication?        Take 1 tablet by mouth 2 times daily for 3 days   Refills:  0                Procedures and tests performed during your visit     *UA reflex to Microscopic (ED Lab POCT Only 3-11)    Urine Microscopic      Orders Needing Specimen Collection     None      Pending Results     No orders found from 12/10/2017 to 12/13/2017.            Pending Culture Results     No orders found from 12/10/2017 to 12/13/2017.            Pending Results Instructions     If you had any lab results that were not finalized at the time of your Discharge, you can call the ED Lab Result RN at [phone]. You will be contacted by this team for any positive Lab results or changes in treatment. The nurses are available 7 days a week from 10A to 6:30P.  You can leave a message 24 hours per day and they will return your call.        Test Results From  Your Hospital Stay        12/12/2017 10:41 PM      Component Results     Component Value Ref Range & Units Status    Color Urine Lisa  Final    Appearance Urine Cloudy  Final    Glucose Urine 100 (A) NEG^Negative mg/dL Final    Bilirubin Urine Negative NEG^Negative Final    Ketones Urine Negative NEG^Negative mg/dL Final    Specific Gravity Urine 1.020 1.003 - 1.035 Final    Blood Urine Large (A) NEG^Negative Final    pH Urine 5.5 5.0 - 7.0 pH Final    Protein Albumin Urine 100 (A) NEG^Negative mg/dL Final    Urobilinogen Urine 0.2 0.2 - 1.0 EU/dL Final    Nitrite Urine Negative NEG^Negative Final    Leukocyte Esterase Urine Trace (A) NEG^Negative Final    Source Midstream Urine  Final         12/12/2017 10:41 PM      Component Results     Component Value Ref Range & Units Status    WBC Urine O - 2 OTO2^O - 2 /HPF Final    RBC Urine >100 (A) OTO2^O - 2 /HPF Final    Bacteria Urine Few (A) NEG^Negative /HPF Final                Clinical Quality Measure: Blood Pressure Screening     Your blood pressure was checked while you were in the emergency department today. The last reading we obtained was  BP: 192/86 . Please read the guidelines below about what these numbers mean and what you should do about them.  If your systolic blood pressure (the top number) is less than 120 and your diastolic blood pressure (the bottom number) is less than 80, then your blood pressure is normal. There is nothing more that you need to do about it.  If your systolic blood pressure (the top number) is 120-139 or your diastolic blood pressure (the bottom number) is 80-89, your blood pressure may be higher than it should be. You should have your blood pressure rechecked within a year by a primary care provider.  If your systolic blood pressure (the top number) is 140 or greater or your diastolic blood pressure (the bottom number) is 90 or greater, you may have high blood pressure. High blood pressure is treatable, but if left untreated over  time it can put you at risk for heart attack, stroke, or kidney failure. You should have your blood pressure rechecked by a primary care provider within the next 4 weeks.  If your provider in the emergency department today gave you specific instructions to follow-up with your doctor or provider even sooner than that, you should follow that instruction and not wait for up to 4 weeks for your follow-up visit.        Thank you for choosing [facility]       Thank you for choosing [facility] for your care. Our goal is always to provide you with excellent care. Hearing back from our patients is one way we can continue to improve our services. Please take a few minutes to complete the written survey that you may receive in the mail after you visit with us. Thank you!        [employer] Information     [facility] gives you secure access to your electronic health record. If you see a primary care provider, you can also send messages to your care team and make appointments. If you have questions, please call your primary care clinic.  If you do not have a primary care provider, please call [phone] and they will assist you.        Care EveryWhere ID     This is your Care EveryWhere ID. This could be used by other organizations to access your [facility] medical records  [id number]        Equal Access to Services     RAHEEM ARTHUR : Hadcooper Ahuja, renzo anthony, jerica lazo, kalyan lyon. So [facility] [phone].    ATENCIÓN: Si habla español, tiene a saunders disposición servicios gratuitos de asistencia lingüística. Llame al [phone].    We comply with applicable federal civil rights laws and [place] laws. We do not discriminate on the basis of race, color, national origin, age, disability, sex, sexual orientation, or gender identity.            After Visit Summary       This is your record. Keep this with you and show to your community pharmacist(s) and doctor(s) at your next  visit.

## 2017-12-13 NOTE — ED PROVIDER NOTES
History     Chief Complaint:  Urinary Retention    HPI   Bora Metzger is a 86 year old male with a complex medical history, including bladder cancer, carotid artery disease, and NSTEMI who presents to the emergency department today for evaluation of urinary retention. Of note, the patient underwent bladder surgery 4 days ago for bladder cancer. He then had his catheter removed yesterday. He reports that he has been having blood in his urine, and has been unable to urinate since 1400 today. He states that he has abdominal pain as his abdomen feels full, but denies fever or any other symptoms of illness. Of note, he was on Bactrim for 3 days and his last dose was yesterday.    Allergies:  Insulin  Insulin  Amlodipine Besylate  Contrast Dye  Crestor [Rosuvastatin Calcium]  Gabapentin  Hmg-Coa-R Inhibitors  Livalo [Pitavastatin]  Naprosyn [Naproxen]  Penicillins  Rosuvastatin  Welchol [Colesevelam]  Zetia [Ezetimibe]    Medications:    acetaminophen (TYLENOL) 325 MG tablet  senna-docusate (SENOKOT-S;PERICOLACE) 8.6-50 MG per tablet  tolterodine (DETROL LA) 4 MG 24 hr capsule  HYDROcodone-acetaminophen (NORCO) 5-325 MG per tablet  lisinopril (PRINIVIL/ZESTRIL) 2.5 MG tablet  clopidogrel (PLAVIX) 75 MG tablet  RESTASIS 0.05 % ophthalmic emulsion  DULoxetine (CYMBALTA) 20 MG EC capsule  HYDROcodone-acetaminophen (NORCO) 5-325 MG per tablet  traMADol-acetaminophen (ULTRACET) 37.5-325 MG per tablet  metFORMIN (GLUCOPHAGE-XR) 500 MG 24 hr tablet  LORazepam (ATIVAN) 0.5 MG tablet  tiZANidine (ZANAFLEX) 2 MG tablet  isosorbide mononitrate (IMDUR) 30 MG 24 hr tablet  metoprolol (LOPRESSOR) 50 MG tablet  levothyroxine (SYNTHROID/LEVOTHROID) 25 MCG tablet  evolocumab (REPATHA) 140 MG/ML prefilled autoinjector  nitroglycerin (NITRODUR) 0.4 MG/HR 24 hr patch  lidocaine (LIDODERM) 5 % patch  nitroglycerin (NITROSTAT) 0.4 MG SL tablet  lidocaine (XYLOCAINE) 5 % ointment  levocetirizine (XYZAL) 5 MG tablet  hydrocortisone  (ANUSOL-HC) 2.5 % rectal cream  COENZYME Q-10 PO  aspirin 81 MG tablet    Past Medical History:    Acute myocardial infarction  Anxiety   Arrhythmia   Arthritis   Atrial fibrillation (H)   Bilateral claudication of lower limb (H)   Bladder cancer (H)   Carotid artery disease (H)   Chronic pain syndrome   Coronary artery disease   ED (erectile dysfunction)   Epididymitis   Essential hypertension, benign   Herpes zoster without mention of complication    Hypothyroidism   Malignant neoplasm of prostate (H)   NSTEMI (non-ST elevated myocardial infarction) (H)   Osteoporosis   Hyperlipidemia   Postsurgical aortocoronary bypass status   Stented coronary artery   Diabetes mellitus without mention of complication, not stated as uncontrolled   Hearing loss    Past Surgical History:    Appendectomy   BCG treatment  Carotid endarterectomy  Cholecystectomy   Coronary angiography   Coronary artery bypass  Cystoscopy, biopsy bladder, combined  Cytoscopy, fulgurate bladder tumor, combined  Endarterectomy carotid  HC removal anal fistula, subcutaneous  HC repair of nasal septum  Heart cath, angioplasty  Hernia repair, inguinal RT/LT  Wrist surgery   Transurethral resection of bladder tumors  Tonsillectomy  Turp    Family History:    Heart Failure   Mother   Coronary Artery Disease Mother   Hypertension   Mother   Hyperlipidemia   Mother   Thyroid Disease  Mother   Myocardial Infarction  Father   Coronary Artery Disease Father   Hypertension   Father   Hyperlipidemia   Father   Prostate Cancer  Father   Anesthesia Reaction  Father   DIABETES   Brother     Social History:  The patient was accompanied to the ED by his wife.  Smoking Status: Never Smoker  Smokeless Tobacco: Never Used  Alcohol Use: Negative   Marital Status:       Review of Systems   Constitutional: Negative for fever.   Gastrointestinal: Positive for abdominal pain.   Genitourinary: Positive for difficulty urinating and hematuria.   All other systems reviewed  "and are negative.    Physical Exam     Patient Vitals for the past 24 hrs:   BP Temp Temp src Pulse Heart Rate Resp SpO2 Height   12/12/17 2248 150/80 - - - 80 16 97 % -   12/12/17 2119 192/86 98.1  F (36.7  C) Oral 85 - 18 98 % 1.727 m (5' 8\")     Physical Exam  Nursing note and vitals reviewed.  Constitutional:   Awake alert  HENT:   Pharynx normal, tms normal, atraumatic  Mouth/Throat:  Oropharynx is clear and moist.   Eyes:    Conjunctivae normal and EOM are normal. Pupils are equal, round, and reactive to light.   Neck:    Normal range of motion. Neck supple. No tracheal deviation present.   Cardiovascular: Normal rate, regular rhythm, normal heart sounds and intact distal pulses.    Pulmonary/Chest:  Effort normal and breath sounds normal. No respiratory distress. No wheezes. No rales. No tenderness.   Abdominal:   Soft. The patient exhibits no mass. There is no tenderness. There is no rebound and no guarding.   Musculoskeletal:  Normal range of motion. No edema and no tenderness.   :    Suprapubic fullness. No blood at meatus.  Lymphadenopathy:  No cervical adenopathy.   Neurological:  Alert and oriented to person, place, and time. No cranial nerve deficit. Normal muscle tone.   Skin:    Skin is warm and dry.   Psychiatric:   Normal mood and affect.     Emergency Department Course     Laboratory:  Laboratory findings were communicated with the patient who voiced understanding of the findings.    UA reflex to Microscopic:  (A), Blood Large (A), Protein Albumin 100 (A), Leukocyte Esterase Trace (A) o/w WNL  Urine microscopic: WBC/HPF 0-2, RBC/HPF >100 (A), Bacteria Few (A)    Interventions:  2149 Uro-Jet jelly 10 mLs urethral    Emergency Department Course:    Nursing notes and vitals reviewed.    2127 I performed an exam of the patient as documented above.     The patient provided a urine sample here in the emergency department. This was sent for laboratory testing, findings above.     I personally " reviewed the lab results with the patient and answered all related questions prior to discharge.    I discussed the treatment plan with the patient. They expressed understanding of this plan and consented to discharge. They will be discharged home with instructions for care and follow up. In addition, the patient will return to the emergency department if their symptoms persist, worsen, if new symptoms arise or if there is any concern.  All questions were answered.     Impression & Plan      Medical Decision Making:  Bora Metzger is a 86 year old male who presents to the emergency department today for evaluation of Urinary retention.  Patient had bladder surgery for bladder cancer this past Friday.  He had a Muhammad in until yesterday and was removed in the office.  He was on Bactrim for three days last dose was yesterday.  He has been unable to urinate since about 2 PM today.  He has had some bloody urine.  Muhammad catheter was placed in his symptoms have resolved.  Urinalysis shows blood but no sign of acute  urine tract infection.Discharge to home he will call his urologist tomorrow to see when they want to retry catheter removal.  Leave the Muhammad in for now.    Diagnosis:    ICD-10-CM    1. Urinary retention R33.9     Postoperative     Disposition:   The patient is discharged to home.     Scribe Disclosure:  I, Tomeka Cobos, am serving as a scribe at 9:39 PM on 12/12/2017 to document services personally performed by Lamonte Burk MD, based on my observations and the provider's statements to me.       EMERGENCY DEPARTMENT       Lamonte Burk MD  12/12/17 3307

## 2017-12-13 NOTE — DISCHARGE INSTRUCTIONS
Urinary Retention (Male)  Urinary retention is the medical term for difficulty or inability to pass urine, even though your bladder is full.  Causes  The most common cause of urinary retention in men is the bladder outlet being blocked. This can be due to an enlarged prostate gland or a bladder infection. Certain medicines can also cause this problem. This condition is more likely to occur as men get older.    This condition is treated by insertion of a catheter into the bladder to drain the urine. This provides immediate relief. The catheter may need to remain in place for a few days to prevent a recurrence. The catheter has a balloon on the tip which was inflated after insertion. This prevents the catheter from falling out.  Symptoms  Common symptoms of urinary retention include:    Pain (not experienced by everyone)    Frequent urination    Feeling that the bladder is still full after urinating    Incontinence (not being able to control the release of urine)    Swollen abdomen  Treatment  This condition is treated by inserting a tube (catheter) into the bladder to drain the urine. This provides immediate relief. The catheter may need to stay in place for a few days. The catheter has a balloon on the tip, which is inflated after insertion. This prevents the catheter from falling out.  Home care    If you were given antibiotics, take them until they are used up, or your healthcare provider tells you to stop. It is important to finish the antibiotics even though you feel better. This is to make sure your infection has cleared.    If a catheter was left in place, it is important to keep bacteria from getting into the collection bag. Do not disconnect the catheter from the collection bag.    Use a leg band to secure the drainage tube, so it does not pull on the catheter. Drain the collection bag when it becomes full using the drain spout at the bottom of the bag.    Do not pull on or try to remove your catheter.  This will injure your urethra. The catheter must be removed by a healthcare provider.  Follow-up care  Follow up with your healthcare provider, or as advised.  If a catheter was left in place, it can usually be removed within 3 to 7 days. Some conditions require the catheter to stay in longer. Your healthcare provider will tell you when to return to have the catheter removed.  When to seek medical advice  Call your healthcare provider right away if any of these occur:    Fever of 100.4 F (38 C) or higher, or as directed by your healthcare provider    Bladder or lower-abdominal pain or fullness    Abdominal swelling, nausea, vomiting, or back pain    Blood or urine leakage around the catheter    Bloody urine coming from the catheter (if a new symptom)    Weakness, dizziness, or fainting    Confusion or change in usual level of alertness    If a catheter was left in place, return if:    Catheter falls out    Catheter stops draining for 6 hours  Date Last Reviewed: 7/26/2015 2000-2017 [employer]. [address] [place] [address]. All rights reserved. This information is not intended as a substitute for professional medical care. Always follow your healthcare professional's instructions.

## 2017-12-19 NOTE — ED AVS SNAPSHOT
Emergency Department    [address] [facility] [address]    Phone:  [phone]    Fax:  [phone]                                       Bora Metzger   MRN: [id number]    Department:   Emergency Department   Date of Visit:  12/19/2017           Patient Information     Date Of Birth          1/15/1931        Your diagnoses for this visit were:     Gross hematuria     Bladder spasms     History of biopsy of bladder        You were seen by Mahesh Vera MD.      Follow-up Information     Follow up with Blanca Argueta MD. Schedule an appointment as soon as possible for a visit in 1 day.    Specialty:  Urology    Contact information:    [address] [facility] [address]  [facility] [address] [phone]          Follow up with  Emergency Department.    Specialty:  EMERGENCY MEDICINE    Why:  As needed, If symptoms worsen    Contact information:    [address] [facility] [id number] [phone]        Discharge Instructions         Blood in the Urine    Blood in the urine (hematuria) has many possible causes. If it occurs after an injury (such as a car accident or fall), it is most often a sign of bruising to the kidney or bladder. Common causes of blood in the urine include urinary tract infections, kidney stones, inflammation, tumors, or certain other diseases of the kidney or bladder. Menstruation can cause blood to appear in the urine sample, although it is not coming from the urinary tract.  If only a trace amount of blood is present, it will show up on the urine test, even though the urine may be yellow and not pink or red. This may occur with any of the above conditions, as well as heavy exercise or high fever. In this case, your doctor may want to repeat the urine test on another day. This will show if the blood is still present. If it is, then other tests can be done to find out the cause.  Home care  Follow these home care guidelines:    If your urine  does not appear bloody (pink, brown or red) then you do not need to restrict your activity in any way.    If you can see blood in your urine, rest and avoid heavy exertion until your next exam. Do not use aspirin, blood thinners, or anti-platelet or anti-inflammatory medicines. These include ibuprofen and naproxen. These thin the blood and may increase bleeding.  Follow-up care  Follow up with your healthcare provider, or as advised. If you were injured and had blood in your urine, you should have a repeat urine test in 1 to 2 days. Contact your doctor for this test.  A radiologist will review any X-rays that were taken. You will be told of any new findings that may affect your care.  When to seek medical advice  Call your healthcare provider right away if any of these occur:    Bright red blood or blood clots in the urine (if you did not have this before)    Weakness, dizziness or fainting    New groin, abdominal, or back pain    Fever of 100.4 F (38 C) or higher, or as directed by your healthcare provider    Repeated vomiting    Bleeding from the nose or gums or easy bruising  Date Last Reviewed: 9/1/2016 2000-2017 [employer]. [address] [place] [address]. All rights reserved. This information is not intended as a substitute for professional medical care. Always follow your healthcare professional's instructions.          Future Appointments        Provider Department Dept Phone Center    12/20/2017 10:00 AM Blanca Argueta MD [facility] [phone] [facility]      24 Hour Appointment Hotline       To make an appointment at any [facility] call [phone] ([phone]). If you don't have a family doctor or clinic, we will help you find one. [facility] are conveniently located to serve the needs of you and your family.             Review of your medicines      START taking        Dose / Directions Last dose taken     opium-belladonna 30-16.2 MG per suppository   Commonly known as:  B&O SUPPRETTES   Dose:  30 mg   Quantity:  10 suppository        Place 1 suppository rectally 2 times daily as needed for moderate pain or bladder spasms   Refills:  0          Our records show that you are taking the medicines listed below. If these are incorrect, please call your family doctor or clinic.        Dose / Directions Last dose taken    acetaminophen 325 MG tablet   Commonly known as:  TYLENOL   Dose:  325-650 mg   Quantity:  30 tablet        Take 1-2 tablets (325-650 mg) by mouth every 6 hours as needed for other (mild pain)   Refills:  0        aspirin 81 MG tablet   Dose:  81 mg   Quantity:  100 tablet        Take 1 tablet (81 mg) by mouth daily   Refills:  1        blood glucose monitoring lancets   Quantity:  1 Box        Use to test blood sugar 1 times daily or as directed.   Refills:  11        blood glucose monitoring meter device kit   Quantity:  1 kit        Use to test blood sugar 1 times daily or as directed.   Refills:  0        * blood glucose monitoring test strip   Commonly known as:  no brand specified   Dose:  1 strip   Quantity:  100 each        1 strip by In Vitro route daily Comfort curve test stripes   Refills:  3        * blood glucose monitoring test strip   Commonly known as:  ACCU-CHEK SMILEY PLUS   Quantity:  100 each        Use to test blood sugar 1 times daily or as directed.   Refills:  11        clopidogrel 75 MG tablet   Commonly known as:  PLAVIX   Dose:  75 mg   Quantity:  90 tablet        Take 1 tablet (75 mg) by mouth daily   Refills:  2        COENZYME Q-10 PO   Dose:  30 mg        Take 30 mg by mouth daily   Refills:  0        DULoxetine 20 MG EC capsule   Commonly known as:  CYMBALTA   Quantity:  180 capsule        TAKE ONE CAPSULE BY MOUTH TWICE DAILY   Refills:  1        evolocumab 140 MG/ML prefilled autoinjector   Commonly known as:  REPATHA   Dose:  140 mg   Quantity:  2 each        Inject 1 mL  (140 mg) Subcutaneous every 14 days   Refills:  12        * HYDROcodone-acetaminophen 5-325 MG per tablet   Commonly known as:  NORCO   Dose:  1 tablet   Quantity:  90 tablet        Take 1 tablet by mouth every 6 hours as needed for pain #90 for three months supply.  Do not take with tramadol. (Patient only takes when tramadol does not work)   Refills:  0        * HYDROcodone-acetaminophen 5-325 MG per tablet   Commonly known as:  NORCO   Dose:  1-2 tablet   Quantity:  21 tablet        Take 1-2 tablets by mouth every 6 hours as needed for moderate to severe pain (Do NOT drive or use additional narcotics/tylenol while on this medication. Narcotics can cause constipation so please use theprescribed stool softener while on this medication.)   Refills:  0        hydrocortisone 2.5 % cream   Commonly known as:  ANUSOL-HC   Quantity:  30 g        Place rectally 2 times daily as needed for hemorrhoids Do not apply for more that two weeks at a time   Refills:  2        IMDUR 30 MG 24 hr tablet   Generic drug:  isosorbide mononitrate        1-2 tabs daily   Refills:  0        levocetirizine 5 MG tablet   Commonly known as:  XYZAL   Dose:  5 mg   Quantity:  90 tablet        Take 1 tablet (5 mg) by mouth every evening   Refills:  2        levothyroxine 25 MCG tablet   Commonly known as:  SYNTHROID/LEVOTHROID   Dose:  25 mcg   Quantity:  90 tablet        Take 1 tablet (25 mcg) by mouth daily   Refills:  2        * lidocaine 5 % ointment   Commonly known as:  XYLOCAINE   Quantity:  50 g        Apply topically as needed for moderate pain   Refills:  3        * lidocaine 5 % Patch   Commonly known as:  LIDODERM   Dose:  1 patch   Quantity:  30 patch        Place 1 patch onto the skin as needed Over painful areas   Refills:  5        lisinopril 2.5 MG tablet   Commonly known as:  PRINIVIL/Zestril   Quantity:  90 tablet        TAKE ONE TABLET BY MOUTH ONCE DAILY IN THE EVENING   Refills:  0        LORazepam 0.5 MG tablet   Commonly  known as:  ATIVAN   Dose:  0.5-1 mg   Quantity:  60 tablet        Take 1-2 tablets (0.5-1 mg) by mouth nightly as needed Take 1-2 tabs by mouth as needed for anxiety and or sleeping-   Refills:  5        metFORMIN 500 MG 24 hr tablet   Commonly known as:  GLUCOPHAGE-XR   Dose:  1000 mg   Quantity:  360 tablet        Take 2 tablets (1,000 mg) by mouth 2 times daily (with meals)   Refills:  1        metoprolol 50 MG tablet   Commonly known as:  LOPRESSOR   Dose:  50 mg   Quantity:  180 tablet        Take 1 tablet (50 mg) by mouth 2 times daily   Refills:  1        Multi-vitamin Tabs tablet   Dose:  1 tablet        Take 1 tablet by mouth daily   Refills:  0        * nitroGLYcerin 0.4 MG sublingual tablet   Commonly known as:  NITROSTAT   Quantity:  100 tablet        1 TAB EVERY 5 MIN AS NEEDED, UP TO 3 PER EPISODE - Pt may  4 bottles of 25 at a time   Refills:  3        * nitroGLYcerin 0.4 MG/HR 24 hr patch   Commonly known as:  NITRODUR   Dose:  1 patch   Quantity:  30 patch        Place 1 patch onto the skin daily   Refills:  12        RESTASIS 0.05 % ophthalmic emulsion   Dose:  1 drop   Generic drug:  cycloSPORINE        Place 1 drop into both eyes 2 times daily   Refills:  0        senna-docusate 8.6-50 MG per tablet   Commonly known as:  SENOKOT-S;PERICOLACE   Dose:  1-2 tablet   Quantity:  30 tablet        Take 1-2 tablets by mouth 2 times daily To prevent constipation while taking narcotic pain medication. Start with 1 tablet twice daily. If no bowel movement in 24 hours, increase to 2 tablets twice daily.  Discontinue if you have loose stools or when you are no longer taking narcotics.   Refills:  0        tiZANidine 2 MG tablet   Commonly known as:  ZANAFLEX   Dose:  1-2 mg   Quantity:  30 tablet        Take 0.5-1 tablets (1-2 mg) by mouth 2 times daily as needed for muscle spasms   Refills:  1        tolterodine 4 MG 24 hr capsule   Commonly known as:  DETROL LA   Dose:  4 mg   Quantity:  2 capsule         Take 1 capsule (4 mg) by mouth daily as needed For bladder spasm/pain   Refills:  0        traMADol-acetaminophen 37.5-325 MG per tablet   Commonly known as:  ULTRACET   Quantity:  120 tablet        Two every morning and two at dinnertime as needed , discussed/knows not to use with vicodin since both had acetaminophen and would be over-sedating.   Refills:  2        * Notice:  This list has 8 medication(s) that are the same as other medications prescribed for you. Read the directions carefully, and ask your doctor or other care provider to review them with you.            Prescriptions were sent or printed at these locations (1 Prescription)                   Other Prescriptions                Printed at Department/Unit printer (1 of 1)         opium-belladonna (B&O SUPPRETTES) 30-16.2 MG per suppository                Procedures and tests performed during your visit     Basic metabolic panel    Bladder scan    CBC with platelets differential    UA with Microscopic    Urine Culture Aerobic Bacterial      Orders Needing Specimen Collection     None      Pending Results     Date and Time Order Name Status Description    12/19/2017 2035 Urine Culture Aerobic Bacterial In process             Pending Culture Results     Date and Time Order Name Status Description    12/19/2017 2035 Urine Culture Aerobic Bacterial In process             Pending Results Instructions     If you had any lab results that were not finalized at the time of your Discharge, you can call the ED Lab Result RN at [phone]. You will be contacted by this team for any positive Lab results or changes in treatment. The nurses are available 7 days a week from 10A to 6:30P.  You can leave a message 24 hours per day and they will return your call.        Test Results From Your Hospital Stay        12/19/2017  9:16 PM      Component Results     Component Value Ref Range & Units Status    Color Urine Red  Final    Appearance Urine Cloudy  Final     Glucose Urine Negative NEG^Negative mg/dL Final    Bilirubin Urine Negative NEG^Negative Final    Ketones Urine Negative NEG^Negative mg/dL Final    Specific Gravity Urine 1.005 1.003 - 1.035 Final    Blood Urine Large (A) NEG^Negative Final    pH Urine 6.0 5.0 - 7.0 pH Final    Protein Albumin Urine 30 (A) NEG^Negative mg/dL Final    Urobilinogen mg/dL Normal 0.0 - 2.0 mg/dL Final    Nitrite Urine Negative NEG^Negative Final    Leukocyte Esterase Urine Negative NEG^Negative Final    Source Catheterized Urine  Final    WBC Urine 0 0 - 2 /HPF Final    RBC Urine >182 (H) 0 - 2 /HPF Final         12/19/2017  8:46 PM                Clinical Quality Measure: Blood Pressure Screening     Your blood pressure was checked while you were in the emergency department today. The last reading we obtained was  BP: 159/71 . Please read the guidelines below about what these numbers mean and what you should do about them.  If your systolic blood pressure (the top number) is less than 120 and your diastolic blood pressure (the bottom number) is less than 80, then your blood pressure is normal. There is nothing more that you need to do about it.  If your systolic blood pressure (the top number) is 120-139 or your diastolic blood pressure (the bottom number) is 80-89, your blood pressure may be higher than it should be. You should have your blood pressure rechecked within a year by a primary care provider.  If your systolic blood pressure (the top number) is 140 or greater or your diastolic blood pressure (the bottom number) is 90 or greater, you may have high blood pressure. High blood pressure is treatable, but if left untreated over time it can put you at risk for heart attack, stroke, or kidney failure. You should have your blood pressure rechecked by a primary care provider within the next 4 weeks.  If your provider in the emergency department today gave you specific instructions to follow-up with your doctor or provider even  sooner than that, you should follow that instruction and not wait for up to 4 weeks for your follow-up visit.        Thank you for choosing [facility]       Thank you for choosing [facility] for your care. Our goal is always to provide you with excellent care. Hearing back from our patients is one way we can continue to improve our services. Please take a few minutes to complete the written survey that you may receive in the mail after you visit with us. Thank you!        [employer] Information     [employer] gives you secure access to your electronic health record. If you see a primary care provider, you can also send messages to your care team and make appointments. If you have questions, please call your primary care clinic.  If you do not have a primary care provider, please call [phone] and they will assist you.        Care EveryWhere ID     This is your Care EveryWhere ID. This could be used by other organizations to access your [facility] medical records  [id number]        Equal Access to Services     RAHEEM ARTHUR : Sakshi Ahuja, renzo anthony, jerica lazo, kalyan alexandra . So [facility] [phone].    ATENCIÓN: Si habla español, tiene a saunders disposición servicios gratuitos de asistencia lingüística. Llame al [phone].    We comply with applicable federal civil rights laws and [place] laws. We do not discriminate on the basis of race, color, national origin, age, disability, sex, sexual orientation, or gender identity.            After Visit Summary       This is your record. Keep this with you and show to your community pharmacist(s) and doctor(s) at your next visit.

## 2017-12-19 NOTE — ED AVS SNAPSHOT
Emergency Department    [address] [place] [address]    Phone:  [phone]    Fax:  [phone]                                       Bora Metzger   MRN: [id number]    Department:   Emergency Department   Date of Visit:  12/19/2017           After Visit Summary Signature Page     I have received my discharge instructions, and my questions have been answered. I have discussed any challenges I see with this plan with the nurse or doctor.    ..........................................................................................................................................  Patient/Patient Representative Signature      ..........................................................................................................................................  Patient Representative Print Name and Relationship to Patient    ..................................................               ................................................  Date                                            Time    ..........................................................................................................................................  Reviewed by Signature/Title    ...................................................              ..............................................  Date                                                            Time

## 2017-12-20 PROBLEM — R31.9 HEMATURIA: Status: ACTIVE | Noted: 2017-01-01

## 2017-12-20 NOTE — CONSULTS
Cardiology Inpatient Consultation  December 20, 2017    Reason for Consult:  A cardiology consult was requested by Dr. Sil Rose from the Urology service to provide clinical guidance regarding NSTEMI.    HPI:   Bora Metzger is a 86 year old man with a history of NSTEMI (2015), CAD s/p CABG x3 in 2004 (LIMA to LAD, SVG to RCA, SVG to 1st diag), Afib, HTN, T2DM, HLD, bladder CA s/p TURB-T (path benign) with indweling Muhammad and prostate CA, presenting with hematuria, Muhammad thrombosis, suprapubic pain and acute urinary obstruction. The patient had TURB-T on 12/8/17, and his Plavix was held starting 12/1. He has been on Plavix long-term given his severe vascular disease as noted on angiogram 9/2015. Bladder biopsy was negative for malignancy. Following TURB-T, he was discharged with an indwelling Muhammad, which was removed on 12/11. On 12/12, the patient presented to the ED with hematuria and urinary retention, and was discharged with a Muhammad again. Over the next few days, the patient had frequent bypassing of his Muhammad and continued hematuria, along with continued retention and suprapubic discomfort. He presented to the [facility] ED again on 12/19, brought in by his wife with concerns about his persistent symptoms. The patient had an appointment on 12/20 with his Urologist, Dr. Argueta, and wanted to keep that appointment. He was discharged from the ED late on 12/19 and advised to go to his appointment the next morning. A few hours after returning home, the patient's symptoms of abdominal discomfort and persistent hematuria recurred, and he presented to the [facility] ED for further evaluation. At bedside this morning, the patient had complaints of chest pain, was given NTG with good relief. EKG was concerning for ST segment depressions. Troponin was elevated to ~1.5. Cardiology was consulted for likely ACS.     At bedside, the patient had no complaints. He notes he has stable angina at baseline with  exertion, and notes he frequently has stress-induced angina. Notes that pain and stress often trigger his chest pain, and he has good relief with nitroglycerin. Sometimes takes an extra dose of Imdur if his chest pain is persistent. The patient also notes that his recent suprapubic pain and constant bladder irrigation have been exacerbating his chest pain lately. He denies any new symptoms this morning, aside from the chest pain. No chest pressure, diaphoresis, nausea, pain radiating to arms, jaw or back, palpitations, lightheadedness. Uses a treadmill at home and sometimes has R shoulder pain, often relieved with NTG.     Review of Systems:    Complete review of systems was performed and negative except per HPI.    PMH:  Past Medical History:   Diagnosis Date     Acute myocardial infarction, unspecified site, episode of care unspecified 1995     Anxiety      Arrhythmia     PAC, PVC     Arthritis      Atrial fibrillation (H)      Bilateral claudication of lower limb (H)      Bladder cancer (H) 2013     Carotid artery disease (H)     right carotid endarterectomy 2009     Chronic pain syndrome      Coronary artery disease     CVS rec lifelong plavix due to diffuse CAD     ED (erectile dysfunction)     since surgery and XRT     Epididymitis      Essential hypertension, benign      Herpes zoster without mention of complication 2007     History of radiation therapy     prostate, 2001 (37 treatments)     Hypothyroidism      Malignant neoplasm of prostate (H)      NSTEMI (non-ST elevated myocardial infarction) (H) 9/2015 9/2015 Heart cath - severe native vessel CAD, patent LIMA to LAD and SVG to RCA, occluded SVG to 1st diagonal (likely chronic)     Osteoporosis      Other and unspecified hyperlipidemia     statin intolerance     Postsurgical aortocoronary bypass status 2004    [place]-Lad, VG-RI, VG-RCA (cath 2011 grafts open, only potential ischemia prox Lad before [place])     Stented coronary artery     prior to CAB: Lad,  RI, brachyRx     Type II or unspecified type diabetes mellitus without mention of complication, not stated as uncontrolled      Unspecified hearing loss      Active Problems:  Patient Active Problem List    Diagnosis Date Noted     Hematuria 12/20/2017     Priority: Medium     Malignant neoplasm of overlapping sites of bladder (H) 11/16/2017     Priority: Medium     Type 2 diabetes mellitus with peripheral vascular disease (H) 10/20/2015     Priority: Medium     Carotid artery disease (H)      Priority: Medium     right carotid endarterectomy 2009       Bilateral claudication of lower limb (H)      Priority: Medium     Chronic pain syndrome      Priority: Medium     Patient is followed by Bora Hardwick MD for ongoing prescription of pain medication.  All refills should be approved by this provider only at face-to-face appointments - not by phone request.    Medication(s): vicodin 90 over 3 months. Tramadol 360 over 3 months   Maximum quantity per month:    Clinic visit frequency required: Q 3 months     Controlled substance agreement:  Encounter-Level CSA - 9/18/15:               Controlled Substance Agreement - Scan on 9/22/2015  2:28 PM : CONTROLLED SUBSTANCE AGREEMENT (below)            Pain Clinic evaluation in the past: Yes       Date/Location:   [place] [address]    DIRE Total Score(s):  No flowsheet data found.    Last [facility] website verification:  done on 11/15/2016   [email or web address]       Anxiety      Priority: Medium     Atrial fibrillation (H) 07/25/2014     Priority: Medium     Health Care Home 03/19/2014     Priority: Medium     State Tier Level:  Tier 1  Status: not active in care coordination     See Letters for [facility] Care Plan         CAD (coronary artery disease) 03/04/2013     Priority: Medium     CKD (chronic kidney disease) stage 3, GFR 30-59 ml/min 10/05/2012     Priority: Medium     Subclinical hypothyroidism 07/18/2012     Priority: Medium     DDD (degenerative disc disease), cervical  "2011     Priority: Medium     Hypertension goal BP (blood pressure) < 140/90 10/10/2011     Priority: Medium     ACP (advance care planning) 2011     Priority: Medium     Advance Care Planning 2015: Receipt of ACP document:  Received: invalid HCD document dated not dated.  Document not previously scanned. Validation form completed indicating invalid document. Missing Page 3 and \"Legal Page.\" Copy sent to client with information and facilitation resources. Validation form scanned as notation of invalid document received.  Code Status reflects choices in most recent ACP document.  Confirmed/documented designated decision maker(s).  Added by Jono Bishop    Patient states has Advance Directive and will bring in a copy to clinic. 2011          Personal history of malignant neoplasm of bladder 2011     Priority: Medium     Problem list name updated by automated process. Provider to review and confirm       Hyperlipidemia LDL goal <70 2011     Priority: Medium     Statin intolerance       Hearing loss      Priority: Medium     Problem list name updated by automated process. Provider to review       History of MI (myocardial infarction) 2004     Priority: Medium     Problem list name updated by automated process. Provider to review       History of prostate cancer 2004     Priority: Medium     Chronic prostatitis 02/10/2005     Priority: Medium     Social History:  Social History   Substance Use Topics     Smoking status: Never Smoker     Smokeless tobacco: Never Used     Alcohol use No     Family History:  Family History   Problem Relation Age of Onset     Heart Failure Mother      Coronary Artery Disease Mother       age 101 congestive heart attack     Hypertension Mother      Hyperlipidemia Mother      Thyroid Disease Mother      Myocardial Infarction Father      Coronary Artery Disease Father       age 83 heart attack     Hypertension Father      Hyperlipidemia " Father      Prostate Cancer Father      Anesthesia Reaction Father      DIABETES Brother       age 51 drugs/alcohol     CEREBROVASCULAR DISEASE No family hx of        Medications:    DULoxetine  20 mg Oral BID     evolocumab  140 mg Subcutaneous Q14 Days     isosorbide mononitrate  30 mg Oral Daily     metoprolol  50 mg Oral BID     metFORMIN  1,000 mg Oral BID w/meals     levothyroxine  25 mcg Oral Daily     aspirin  325 mg Oral Daily         NaCl 100 mL/hr at 17 1356     sodium chloride 0.9% (bag)       HEParin 850 Units/hr (17 1552)       Physical Exam:  Temp:  [97.5  F (36.4  C)-98.6  F (37  C)] 98  F (36.7  C)  Pulse:  [] 87  Resp:  [16-18] 18  BP: ()/() 106/53  SpO2:  [90 %-100 %] 95 %    Intake/Output Summary (Last 24 hours) at 17 1658  Last data filed at 17 1330   Gross per 24 hour   Intake           141.67 ml   Output            04772 ml   Net        -92809.33 ml     General: Stoic elderly man laying in bed in NAD  HEENT: AT/NC, conjunctivae without injection, anicteric sclerae, benign oropharynx, MMM  Neck: Supple  Cardiovascular: RRR, normal S1 S2, no m/r/g, 2+ peripheral pulses, JVP ~6 cm  Respiratory: LCTAB, no w/r/r, normal respiratory effort  Abdominal: Soft, nondistended, mild suprapubic tenderness, normal bowel sounds  Extremities: WWP, normal bulk, no appreciable joint deformities  Neurologic: CN II-XII grossly intact, AAOx4, no focal deficits    Diagnostics:  All labs and imaging were reviewed, of note:    Results for orders placed or performed during the hospital encounter of 17 (from the past 24 hour(s))   Basic metabolic panel   Result Value Ref Range    Sodium 133 133 - 144 mmol/L    Potassium 4.0 3.4 - 5.3 mmol/L    Chloride 98 94 - 109 mmol/L    Carbon Dioxide 21 20 - 32 mmol/L    Anion Gap 14 3 - 14 mmol/L    Glucose 254 (H) 70 - 99 mg/dL    Urea Nitrogen 20 7 - 30 mg/dL    Creatinine 1.16 0.66 - 1.25 mg/dL    GFR Estimate 60 (L) >60  mL/min/1.7m2    GFR Estimate If Black 72 >60 mL/min/1.7m2    Calcium 9.2 8.5 - 10.1 mg/dL   CBC with platelets differential   Result Value Ref Range    WBC 18.8 (H) 4.0 - 11.0 10e9/L    RBC Count 3.78 (L) 4.4 - 5.9 10e12/L    Hemoglobin 10.6 (L) 13.3 - 17.7 g/dL    Hematocrit 33.1 (L) 40.0 - 53.0 %    MCV 88 78 - 100 fl    MCH 28.0 26.5 - 33.0 pg    MCHC 32.0 31.5 - 36.5 g/dL    RDW 13.8 10.0 - 15.0 %    Platelet Count 395 150 - 450 10e9/L    Diff Method Automated Method     % Neutrophils 74.3 %    % Lymphocytes 16.3 %    % Monocytes 8.4 %    % Eosinophils 0.5 %    % Basophils 0.2 %    % Immature Granulocytes 0.3 %    Nucleated RBCs 0 0 /100    Absolute Neutrophil 13.9 (H) 1.6 - 8.3 10e9/L    Absolute Lymphocytes 3.1 0.8 - 5.3 10e9/L    Absolute Monocytes 1.6 (H) 0.0 - 1.3 10e9/L    Absolute Eosinophils 0.1 0.0 - 0.7 10e9/L    Absolute Basophils 0.0 0.0 - 0.2 10e9/L    Abs Immature Granulocytes 0.1 0 - 0.4 10e9/L    Absolute Nucleated RBC 0.0    Lactic acid level STAT   Result Value Ref Range    Lactic Acid 1.6 0.7 - 2.0 mmol/L   CBC with platelets   Result Value Ref Range    WBC 17.7 (H) 4.0 - 11.0 10e9/L    RBC Count 3.66 (L) 4.4 - 5.9 10e12/L    Hemoglobin 10.2 (L) 13.3 - 17.7 g/dL    Hematocrit 32.2 (L) 40.0 - 53.0 %    MCV 88 78 - 100 fl    MCH 27.9 26.5 - 33.0 pg    MCHC 31.7 31.5 - 36.5 g/dL    RDW 13.8 10.0 - 15.0 %    Platelet Count 340 150 - 450 10e9/L   Basic metabolic panel   Result Value Ref Range    Sodium 133 133 - 144 mmol/L    Potassium 4.5 3.4 - 5.3 mmol/L    Chloride 100 94 - 109 mmol/L    Carbon Dioxide 22 20 - 32 mmol/L    Anion Gap 11 3 - 14 mmol/L    Glucose 259 (H) 70 - 99 mg/dL    Urea Nitrogen 21 7 - 30 mg/dL    Creatinine 1.19 0.66 - 1.25 mg/dL    GFR Estimate 58 (L) >60 mL/min/1.7m2    GFR Estimate If Black 70 >60 mL/min/1.7m2    Calcium 8.6 8.5 - 10.1 mg/dL   EKG 12-lead, complete   Result Value Ref Range    Interpretation ECG Click View Image link to view waveform and result    CBC with  platelets   Result Value Ref Range    WBC 15.1 (H) 4.0 - 11.0 10e9/L    RBC Count 3.23 (L) 4.4 - 5.9 10e12/L    Hemoglobin 9.0 (L) 13.3 - 17.7 g/dL    Hematocrit 28.0 (L) 40.0 - 53.0 %    MCV 87 78 - 100 fl    MCH 27.9 26.5 - 33.0 pg    MCHC 32.1 31.5 - 36.5 g/dL    RDW 13.8 10.0 - 15.0 %    Platelet Count 317 150 - 450 10e9/L   Magnesium   Result Value Ref Range    Magnesium 1.3 (L) 1.6 - 2.3 mg/dL   Basic metabolic panel   Result Value Ref Range    Sodium 134 133 - 144 mmol/L    Potassium 4.6 3.4 - 5.3 mmol/L    Chloride 102 94 - 109 mmol/L    Carbon Dioxide 26 20 - 32 mmol/L    Anion Gap 6 3 - 14 mmol/L    Glucose 184 (H) 70 - 99 mg/dL    Urea Nitrogen 19 7 - 30 mg/dL    Creatinine 1.10 0.66 - 1.25 mg/dL    GFR Estimate 63 >60 mL/min/1.7m2    GFR Estimate If Black 77 >60 mL/min/1.7m2    Calcium 8.3 (L) 8.5 - 10.1 mg/dL   Troponin I   Result Value Ref Range    Troponin I ES 1.489 () 0.000 - 0.045 ug/L   EKG 12-lead, tracing only   Result Value Ref Range    Interpretation ECG Click View Image link to view waveform and result    Troponin I   Result Value Ref Range    Troponin I ES 2.570 () 0.000 - 0.045 ug/L       Lab Results   Component Value Date    TROPI 2.570 () 12/20/2017    TROPI 1.489 () 12/20/2017    TROPI 0.448 () 09/24/2015    TROPI 0.606 () 09/23/2015    TROPI 0.721 () 09/23/2015    TROPONIN 0.01 03/16/2014       EKG 12/20/17 1036am:  Rhythm: Normal sinus   Rate: Tachycardia  Axis: Normal  Ectopy: None  Conduction: Normal  ST Segments/ T Waves: ST Segment Depression in II, III, aVF, V4, V5 and V6, TWI in II, III, aVF  Q Waves: None  Comparison to prior: Incomplete RBBB no longer present  Clinical Impression: Acute inferior and lateral myocardial ischemic changes    EKG 12/20/17 :  Rhythm: Normal sinus   Rate: Normal  Axis: Normal  Ectopy: None  Conduction: Normal  ST Segments/ T Waves: No ST-T wave changes and No acute ischemic changes  Q Waves: None  Comparison to prior: ST segment  depressions no longer evident, TWI no longer evident  Clinical Impression: No acute changes, resolution of ischemic changes seen in earlier EKG    Assessment and Recommendation:  Bora Meztger is a 86 year old man with a history of NSTEMI (2015), CAD s/p CABG x3 in 2004 (LIMA to LAD, SVG to RCA, SVG to 1st diag), Afib, HTN, T2DM, HLD, bladder CA s/p TURB-T (path benign) with indweling Muhammad and prostate CA, presenting with hematuria, Muhammad thrombosis, suprapubic pain and acute urinary obstruction, with EKG changes and troponin elevation concerning for NSTEMI. Repeat EKG showed resolution of ischemic changes, though troponin uptrending. The patient was started on a heparin drip and decision was made to pursue medical management for now. The CIS team will be alerted for possible PCI this Friday 12/22. Given patient's persistent hematuria following TURB-T and obstructive uropathy, will have to consider the implication of restarting DAPT.    - Medical management for now, will reassess tomorrow AM  - Will discuss with CIS team tomorrow 12/21 regarding catheterization/possible PCI Friday 12/22  - Continue heparin gtt medium intensity  - Got full strength ASA today, continue with ASA 81 mg PO QDaily  - Continue metoprolol tartrate 50 mg PO BID  - Continue lisinopril 2.5 mg PO QDaily  - Continue NTG 0.4 mg SL Q5min PRN chest pain     The patient was discussed with Dr. Mikie Mendes, who agrees with the assessment and plan.    Thank you for allowing us to participate in the care of this patient. The Cardiology team will continue to follow. Please do not hesitate to call us with questions or concerns.    Michael Winn MD PhD  Internal Medicine PGY-1  Pager [phone]    I very much appreciated the opportunity to see and assess Mr Bora Metzger in the hospital with CV  Resident Dr Winn. In essence patient has complex urologic issue that needs resolution. Any cardiac study that rsults in stenting could  aggravate bleeding issues. If bladder bleeding is controlled then poss Cath Friday     I agree with the note above which summarizes my findings and current recommendations.  Please do not hesitate to contact my office if you have any questions or concerns.      Mikie Mendes MD  Cardiac Arrhythmia Service  [facility]  [phone]

## 2017-12-20 NOTE — ED PROVIDER NOTES
History     Chief Complaint   Patient presents with     Catheter Problem     HPI  Bora Metzger is a 86 year old male history of bladder cancer, atrial fibrillation, hyperlipidemia, diabetes among other medical problems.  Patient recently had a cystoscopy and bladder biopsy performed by Dr. Argueta on December 8.  Since then has struggled with some hematuria and several episodes of Muhammad catheter obstruction.  Last seen at [facility] yesterday early evening.  At that time had catheter replaced, irrigation performed and subsequently discharged with anticipation of follow-up visit with his urologist this morning at the [facility].  Unfortunately, he developed acute urinary obstruction again likely related to Muhammad catheter thrombosis, suprapubic abdominal pain and came to the emergency department.    Has had no fevers or chills, and felt well while at home.      I have reviewed the Medications, Allergies, Past Medical and Surgical History, and Social History in the Epic system.    Review of Systems   14 point review of symptoms was performed and is negative except as noted above.     Physical Exam   BP: (!) 156/100  Pulse: 111  Temp: 97.5  F (36.4  C)  Weight:  (no scale bed)  SpO2: 100 %      Physical Exam  GEN: Uncomfortable appearing complaining of pain around his bladder  HEENT: The head is normocephalic and atraumatic. Pupils are equal round and reactive to light. Extraocular motions are intact.   CV: Regular rate and rhythm   PULM: Unlabored breathing   ABD: Soft, suprapubic tenderness to palpation, nondistended.   EXT: Full range of motion.  No edema.  NEURO: Cranial nerves II through XII are intact and symmetric.    PSYCH: Calm and cooperative, interactive.     ED Course     ED Course     Procedures          Bladder imaging revealed low volume of urine likely less than 100 cc  Per patient, prior bladder volume assessment had been inaccurate.  Muhammad catheterization/replacement was performed with scant bloody  urine drained.       Labs Ordered and Resulted from Time of ED Arrival Up to the Time of Departure from the ED   BASIC METABOLIC PANEL - Abnormal; Notable for the following:        Result Value    Glucose 254 (*)     GFR Estimate 60 (*)     All other components within normal limits   CBC WITH PLATELETS DIFFERENTIAL - Abnormal; Notable for the following:     WBC 18.8 (*)     RBC Count 3.78 (*)     Hemoglobin 10.6 (*)     Hematocrit 33.1 (*)     Absolute Neutrophil 13.9 (*)     Absolute Monocytes 1.6 (*)     All other components within normal limits   BLADDER SCAN   CONTINUE INDWELLING URINARY CATHETER (LAMB)   BLADDER IRRIGATION            Assessments & Plan (with Medical Decision Making)   86-year-old male with multiple medical problems, bladder cancer, status post recent bladder biopsy with recurrent Lamb catheter obstruction likely related to thrombosis.    Patient was discussed with urology.  Given his recurrent problems he will be admitted to their service for continuous bladder irrigation and reevaluation, possible cystoscopy.  Patient is agreeable with this plan.  We will continue to carefully monitor and initiate bladder irrigation in the emergency department.    I have reviewed the nursing notes.    I have reviewed the findings, diagnosis, plan and need for follow up with the patient.    New Prescriptions    No medications on file       Final diagnoses:   Bladder outlet obstruction       12/20/2017   [facility], [place], EMERGENCY DEPARTMENT     Edgardo Mays MD  12/20/17 0149

## 2017-12-20 NOTE — ED NOTES
Pt decided to come here rather than going back to [facility] because they think he will be admitted and his doctor is here.

## 2017-12-20 NOTE — PLAN OF CARE
Problem: Patient Care Overview  Goal: Plan of Care/Patient Progress Review  Outcome: No Change  Patient transferring to  when bed available. He was admitted from home to Urology service due to hematuria. Urology wanted him on telemetry, due to his hx of a past MI and current A-fib. He transferred here during night, around 4 AM. Urologists put in a CBI hagan and patient on continuous bladder irrigation. Urine cherry red, with no visible clots. However, he began to leak large quantities of urine around the catheter this AM. He also was having generalized chronic pain(due to falling off a ladder 30 years ago)that was intensifying. Order obtained for Norco, which was given around 1010. At that time, patient stated he was having chest pain and asked for NTG. He did not mention radiation of the pain, but he was slightly diaphoretic. NTG given at 1012. CP began to ease off. Urology PA notified. Stat EKG done and troponin drawn. Additionally, Urology irrigated hagan and withdrew several clots. CBI resumed. Troponin level came back at 1.489 and Urology PA again notified. Patient given regular strength ASA. Will be starting on a heparin drip, and will transfer to . Vitals have been unremarkable.

## 2017-12-20 NOTE — PLAN OF CARE
Problem: Patient Care Overview  Goal: Plan of Care/Patient Progress Review  Outcome: No Change  Patient was transferred from  to , room 5235-2 for cardiac monitoring at 0430. Continuous bladder irrigation was running previous to transfer. Cardiac rhythm is afib and tachycardic. History of afib. Urine is pinkish red and there was some bleeding from penis around catheter. Dr. Edward assessed the patient at that time. Bladder spasm pain was treated with detrol and B&O suppository. His wife Cortney is at bedside and very supportive. Continue with current plan of nursing care, and update MD with concerns as needed.

## 2017-12-20 NOTE — ED PROVIDER NOTES
History     Chief Complaint:  Catheter problem    HPI   Bora Metzger is a 86 year old male with a medical history of hyperlipidemia, diabetes, hypertension, bladder cancer, hypothyroidism, atrial fibrillation, and coronary artery disease who presents with concerns for a catheter problem. Patient had cystoscopy and bladder biopsy by Dr. CRESCENCIO Argueta with Urology on 12/08/17.  He was prescribed 3 days of postoperative bactrim which has since finished. Patient went into clinic on 12/11/17 and had his hagan catheter removed.  He then presented here in the emergency department on 12/12/17 for urinary retention and hematuria. Urinalysis was obtained and showed blood but no signs of infection. Hagan catheter was placed during this visit and he was discharged with it in place. Per patient and wife, patient has had problems with the Hagan since it was last placed, with frequent draining around the catheter with frequently bloody urine. Patient has been having intermittent hematuria, and he last noticed bleeding this afternoon. Blood clots were also noted at times.  Patient has not been on Plavix since 12/01/17. For the last 2 hours, they noticed that the hagan catheter has not been draining in the bag for the last 2 hours and he has developed some suprapubic discomfort.  No fevers. Patient has an appointment with urologist tomorrow morning at 0945 to get the results from the bladder biopsy and discuss the plan of action going forward.    Allergies:  Insulin  Amlodipine Besylate  Contrast Dye  Crestor  Gabapentin  Hmg-Coa-R inhibitors  Livalo  Naprosyn  Penicillins  Rosuvastatin  Welchol  Zetia     Medications:    Senokot  Detrol  Prinivil/Zestril  Plavix  Cymbalta  Glucophage  Ativan  Zanaflex  Imdur  Lopressor  Synthroid/Levothroid  Repatha  Nitrodur  Nitrostat  Xyzal  Multivitamins    Past Medical History:    Acute myocardial infarction  Anxiety  Arrhythmia  Arthritis  Atrial fibrillation  Bladder cancer  Bilateral  "claudication of lower limb  Carotid artery disease  Chronic pain syndrome  Coronary artery disease  Erectile dysfunction  Epididymitis  Hypertension  Hypothyroidism  Malignant neoplasm of prostate  Osteoporosis  Hyperlipidemia  Stented coronary artery  Diabetes  Hearing loss    Past Surgical History:    Appendectomy  Cartoid endarterectomy  Cholecystectomy, open  Coronary artery bypass x2  Cystoscopy x2  Endarterectomy carotid  Coronary stent   Removal anal fistula  Repair of nasal septum  Heart catheter x4  Hernia repair  Wrist surgery  Transurethral resection of bladder tumors  Tonsillectomy  TURP    Family History:    Heart failure  Coronary artery disease  Hypertension  Hyperlipidemia  Thyroid disease  Myocardial infarction  Prostate cancer  Anesthesia reaction  Diabetes    Social History:  Smoking status: Never smoker  Alcohol use: None   Marital Status:   [2]  Wife at bedside.     Review of Systems   All other systems reviewed and are negative.      Physical Exam   Patient Vitals for the past 24 hrs:   BP Temp Temp src Pulse Resp SpO2 Height Weight   12/19/17 2130 135/63 - - - - 96 % - -   12/19/17 2100 - - - - - 98 % - -   12/19/17 2030 - - - - - 96 % - -   12/19/17 1831 159/71 97.8  F (36.6  C) Oral 107 18 98 % 1.727 m (5' 8\") 70.3 kg (155 lb)      Physical Exam  General: male recumbent in room 26, wife at bedside  HENT: mucous membranes moist  CV: mildly tachycardic rate  Resp: normal effort  GI: abdomen soft and with moderate suprapubic tenderness, no guarding  MSK:   Thoracic spine: nontender, no CVAT  Lumbar spine: nontender  Pelvis stable.  : 16F Muhammad in place with nearly empty leg bag containing only traces of bloody urine without visible clots.  Undergarments with blood stains.  Scrotum and penis nontender, no purulent discharge visible.  Skin: appropriately warm and dry, no erythema/ecchymosis/vesicles to back  Neuro: alert, clear speech, oriented though somewhat poor historian  Psych: " "appears frustrated with circumstances, cooperative      Emergency Department Course   Laboratory:  UA: Urine bloo Large, Protein albumin 30, RBC/HPF >182 (H)  UA culture: Pending.    Interventions:  2102: Ultracet 2 tablets oral  (baseline home med, given per pt request)  2103: B&O Supprettes 1 suppository rectal    Emergency Department Course:  Past medical records, nursing notes, and vitals reviewed.  1845: I performed an exam of the patient and obtained history, as documented above.     I performed electronic chart review in EPIC.    1903: I rechecked the patient.   1908: IV inserted and blood drawn.   1928: I updated the patient and the patient's wife on the findings.  \"just get me a bigger Hagan and kick my butt out of here!\"  2031: I rechecked the patient. Bladder is currently being irrigated. New 22F 3-way hagan has been placed by ED RN.  2116: I rechecked the patient. Findings and plan explained to the Patient and spouse. Patient discharged home with instructions regarding supportive care, medications, and reasons to return. The importance of close follow-up was reviewed.        Impression & Plan    Medical Decision Making:  Bora Metzger is a 86 year old male with recent Urologic procedure presents with concerns that his Hagan is not draining well through the tube but rather leaking occasional blood around it. He had a 22 Japanese placed at the time of recent cystoscopy. Then when he returned here recently, a smaller 16 Japanese catheter was placed. He did not have significant urinary retention today though I felt it was most appropriately to exchange the catheter for a large one and perform some irrigation due to risk of clotting of his existing catheter. His urine cleared to a light pink and did not occlude the new catheter.  No clots were seen.  Clinically his story is not suspicious for infection and I do not think that the potential adverse effects of antibiotics can currently be justified though " culture is pending to facilitate follow up.  I proposed that we check some basic blood counts and then I talk with his Urology team, though the patient persistently and repeatedly simply requested discharge home with a leg bag as he plans to follow-up with his Urologist in clinic at the appointment already scheduled for about 12 hours.  While he is at some risk for clotting and other problems, I think it is reasonable to kezia his request. He had some bladder spasms that were treated with B&O suppository, and a short prescription for the same was provided.  He is not on blood thinners and I do not think acute blood loss anemia is likely. Blood pressure has been satisfactory. He should return for acute deterioration; all questions answered to his satisfaction prior to discharge.    Diagnosis:    ICD-10-CM    1. Gross hematuria R31.0 UA with Microscopic     Urine Culture Aerobic Bacterial   2. Bladder spasms N32.89    3. History of biopsy of bladder Z98.890      Disposition:  discharged to home    Discharge Medications:   Details   opium-belladonna (B&O SUPPRETTES) 30-16.2 MG per suppository Place 1 suppository rectally 2 times daily as needed for moderate pain or bladder spasms, Disp-10 suppository, R-0, Local Print     Mary Peoples  12/19/2017    EMERGENCY DEPARTMENT  I, Mary Peoples, am serving as a scribe at 6:12 PM on 12/19/2017 to document services personally performed by Mahesh Vera MD based on my observations and the provider's statements to me.       Mahesh Vera MD  12/19/17 [id number]

## 2017-12-20 NOTE — H&P
Urology Admission History and Physical    Name: Bora Metzger    MRN: [id number]   YOB: 1931                 Chief Complaint:   Hematuria and clot retention          History of Present Illness:   Bora Metzger is a 86 year old male with complex PMH most pertinent for afib and CAD with history of MI on lifelong plavix and urologic hx of prostate cancer s/p radiation ~10y ago, radiation cystitis, and recurrent BCG-refractory NMIBC who recently underwent a TURBT with Dr. Argueta on 12/8/2017 (surgical pathology returned as benign urothelial tissue). Plavix was held 7 days prior to his procedure but he developed recurrent hematuria with clot retention post op requiring hospital admission at [facility] earlier this week with initiation of CBI by urology. However, patient demanded to leave yesterday in order to see Dr. Argueta for his scheduled clinic appointment on Wednesday morning. CBI was thus stopped and patient was discharged home but he developed clot retention and significant suprapubic pain soon after. He thus presented to our ED for further management.     At bedside he denies f/c/n/v and complains of only suprapubic pain and retention.           Past Medical History:   Reviewed in EMR; noted for above         Past Surgical History:   Reviewed in EMR, noted for CEA and cardiac catheter/stent placement in the past           Social History:   Non-smoker, social etoh,  no illicit drugs         Family History:   Not pertinent          Allergies:   .Reviewed in EMR         Medications:   .Reviewed in EMR. On baby ASA 81          Review of Systems:      ROS: 10 point ROS neg other than the symptoms noted above in the HPI           Physical Exam:     VS:  T: 97.5    HR: 111    BP: 135/73    RR: Data Unavailable   GEN:  AOx3.  NAD.    CV:  RRR  LUNGS: Non-labored breathing.   BACK:  No midline or CVA tenderness.  ABD:  Soft.  NT.  ND.  No rebound or guarding.  No masses.  :  Circumcised.  Bilateral descended testes  EXT:  Warm, well perfused.  no edema.  SKIN:  Warm.  Dry.  No rashes.  NEURO:  CN grossly intact.            Procedure:     Procedure: The patient was prepped and draped using a hagan catheter kit. A lidojet 2% intraurethral lidocaine injection was used. A 24 Fr 6 hole irrigation catheter was introduced in standard sterile fashion without resistance to the hub with return of clot. Bladder was irrigated with ~2.5 liters of NS to clear pink urine, then catheter was exchanged for a 3-way 24Fr catheter, connected to CBI which was again weaned to clear pink urine.   Patient given 1x dose of ciprofloxacin 500 mg for this procedure         Data:     All laboratory data reviewed:      Recent Labs  Lab 12/20/17  0045   WBC 18.8*   HGB 10.6*          Recent Labs  Lab 12/20/17  0045      POTASSIUM 4.0   CHLORIDE 98   CO2 21   BUN 20   CR 1.16   *   YUAN 9.2       Recent Labs  Lab 12/19/17  2040   COLOR Red   APPEARANCE Cloudy   URINEGLC Negative   URINEBILI Negative   URINEKETONE Negative   SG 1.005   URINEPH 6.0   PROTEIN 30*   NITRITE Negative   LEUKEST Negative   RBCU >182*   WBCU 0            Impression and Plan:   Impression:   Bora Metzger is a 86 year old male with PMH of CAD on lifelong plavix and recent TURBT with subsequent hematuria and clot retention, now s/p bedside irrigation and initiation of CBI.       - Admit to urology service for CBI/hematuria  - PRN pain medications  - Tele, continuous pulse ox  - Recheck CBC and BMP at 6 am  - NPO overnight except for medications  - Continue to hold plavix; will consult medicine re whether patient needs ppx in the interim given his CAD hx      This patient's exam findings, labs, and imaging discussed with urology staff surgeon, Dr. Sinclair, who developed the treatment plan.    Farnaz Edward MD MS  Urology Resident

## 2017-12-20 NOTE — PROGRESS NOTES
Urology Daily Progress Note  12/20/2017    24 hour events/Subjective:     - No acute events since admission   - No chest pain or SOB   - Denies nausea or emesis   - Catheter not draining as we enter room    O:  Temp:  [97.5  F (36.4  C)-98.6  F (37  C)] 98.6  F (37  C)  Pulse:  [102-111] 102  Resp:  [16-18] 16  BP: (103-161)/() 127/67  SpO2:  [90 %-100 %] 92 %  General: Alert, interactive, & in NAD; resting  Resp: Breathing is non-labored on RA  Cardiac: Extremities warm  : Muhammad in place with watermelon colored urine; CBI clamped as catheter is not draining  Extremities: No LE edema or obvious joint abnormalities  Skin: Warm and dry, no jaundice or rash     Ins & Outs (24 hours/since MN)  UOP  CBI    Labs/Imaging  BMP  Recent Labs  Lab 12/20/17  0045      POTASSIUM 4.0   CHLORIDE 98   YUAN 9.2   CO2 21   BUN 20   CR 1.16   *     CBC  Recent Labs  Lab 12/20/17  0358 12/20/17  0045   WBC 17.7* 18.8*   RBC 3.66* 3.78*   HGB 10.2* 10.6*   HCT 32.2* 33.1*   MCV 88 88   MCH 27.9 28.0   MCHC 31.7 32.0   RDW 13.8 13.8    395     Assessment/Plan  86 year old male with PMH of CAD on lifelong plavix and recent TURBT with subsequent hematuria and clot retention, now s/p bedside irrigation and initiation of CBI.     PLAN:  Neuro/Pain: Continue current regimen  CV/PULM: No acute issues. Encourage IS, ambulation; holding plavix  GI/FEN:    - NPO   - Electrolyte replacement PRN   - Continue mIVF  : Will irrigate bladder at bedside this AM; wean CBI as able  Heme: No active issues; f/u AM labs  ID: No active issues   Endocrine: No active issues   Activity: Up ad lissette  Ppx: SCDs, ambulation, IS  Dispo: 5B    Seen and examined with the chief resident. Will discuss with Dr. Argueta.    --    Sudhakar Montana MD   Urology Resident    5:10 AM, 12/20/2017       Contacting the Urology Team     Please use the following job codes to reach the Urology Team. Note that you must use an in house phone and that job codes  cannot receive text pages.     On weekdays, dial [phone] (or star-star-star 777 on the new [employer] telephones) then [phone] to reach the Adult Urology resident or PA on call    On weekdays, dial [phone] (or star-star-star 777 on the new [employer] telephones) then 0818 to reach the Pediatric Urology resident    On weeknights and weekends, dial [phone] (or star-star-star 777 on the new [employer] telephones) then [phone] to reach the Urology resident on call (for both Adult and Pediatrics)

## 2017-12-20 NOTE — PROGRESS NOTES
"Urology Brief Note:    Mr. Metzger is an 86M with h/o NSTEMI on lifelong plavix who also underwent a transurethral resection of bladder tumor with Dr. Argueta on 12/8/17.  He developed hematuria postoperatively and presented to the [facility] ED yesterday where he was admitted for this.  He has been off plavix for \"20 days\" per his wife.     Per the RN the patient developed chest pain around 10:30 this am.  Stat EKG was ordered by the bozena.  I saw the patient shortly thereafter.  Mr. Metzger told me that he has chronic arthritis, particularly in his shoulders so he often \"cannot tell the difference\" between angina and musculoskeletal pain.  Nonetheless when he feels this pain at home he takes nitrate and the symptoms always seem to resolve. Here in the hospital he did receive his IMDUR at around 9am.      By the time I saw him at around 10:45 his pain was \"practically gone\" and he looked relaxed, was breathing room air RR=18. BP was ~130/75 and was mildly tachy at 107.     Unfortunately his catheter had also clotted-off.  Therefore, the patient was given 0,3mg of dilaudid and I proceeded to hand-irrigate.  ~300cc of viscous bright red urine was aspirated.  I also removed about 100cc of organized clot.  At the conclusion, irrigant was pale pink.  CBI was restarted on a moderate drip.  The patient tolerated without difficulty.     For the hematuria and CP, stat labs were sent.  These showed normal K 4.6 and creatinine 1.10mg/dL, but low mag 1.3 and falling hgb (9.0 down from 10.2 at 4 this am).  Notably trop was 1.489.  At this point the EKG had been completed and was also concerning for inferior MI thus Cardiology was consulted ASAP.  Mag replacements were ordered. Cardiology ordered asa 325.  We will also start a low intensity heparin gtt and transfer the patient to .      Further recs are pending from the Cardiology service.    Plan discussed with patient and his wife  Also discussed with Dr. Argueta, Staff and " the bedside RN    Bobbi Franco PA-C  [phone]

## 2017-12-20 NOTE — PLAN OF CARE
Pt arrived to unit from ED around 0315 presenting with catheter problems. A&O, VSS on RA ex tachycardic; pt triggered sepsis protocol, LA ordered. Muhammad in place with CBI infusing, output is light red. C/o pain around catheter site, PRN Tylenol given. Admission documentation completed. Oriented to room and call light.    Pt to be monitored on tele, transferred to unit 5B around 0415.

## 2017-12-20 NOTE — DISCHARGE INSTRUCTIONS
Blood in the Urine    Blood in the urine (hematuria) has many possible causes. If it occurs after an injury (such as a car accident or fall), it is most often a sign of bruising to the kidney or bladder. Common causes of blood in the urine include urinary tract infections, kidney stones, inflammation, tumors, or certain other diseases of the kidney or bladder. Menstruation can cause blood to appear in the urine sample, although it is not coming from the urinary tract.  If only a trace amount of blood is present, it will show up on the urine test, even though the urine may be yellow and not pink or red. This may occur with any of the above conditions, as well as heavy exercise or high fever. In this case, your doctor may want to repeat the urine test on another day. This will show if the blood is still present. If it is, then other tests can be done to find out the cause.  Home care  Follow these home care guidelines:    If your urine does not appear bloody (pink, brown or red) then you do not need to restrict your activity in any way.    If you can see blood in your urine, rest and avoid heavy exertion until your next exam. Do not use aspirin, blood thinners, or anti-platelet or anti-inflammatory medicines. These include ibuprofen and naproxen. These thin the blood and may increase bleeding.  Follow-up care  Follow up with your healthcare provider, or as advised. If you were injured and had blood in your urine, you should have a repeat urine test in 1 to 2 days. Contact your doctor for this test.  A radiologist will review any X-rays that were taken. You will be told of any new findings that may affect your care.  When to seek medical advice  Call your healthcare provider right away if any of these occur:    Bright red blood or blood clots in the urine (if you did not have this before)    Weakness, dizziness or fainting    New groin, abdominal, or back pain    Fever of 100.4 F (38 C) or higher, or as directed by  your healthcare provider    Repeated vomiting    Bleeding from the nose or gums or easy bruising  Date Last Reviewed: 9/1/2016 2000-2017 [employer]. [address] [place] [address], [place] [address]. All rights reserved. This information is not intended as a substitute for professional medical care. Always follow your healthcare professional's instructions.

## 2017-12-20 NOTE — PROVIDER NOTIFICATION
"Patient's Troponin came back at 1.489. ANNIE Franco informed. Patient given  mg. Currently his chest pain is \"95 % gone\". He will transfer to Cardiology and move to  and start on a heparin drip. CBI continues. Urine the color of Annie wine.   "

## 2017-12-20 NOTE — ED NOTES
"Pt arrived for \"catheter backup\". He was seen at [facility] today and they wanted to admit him, but he did want to be admitted so he went home. When they got home he started bleeding from the hagan and is in pain. He has a history of bladder cancer and blood clots   "

## 2017-12-20 NOTE — PROVIDER NOTIFICATION
Patient having increasingly large quantities of urine leaking out around CBI catheter around 1015. Patient also c/o increasing generalized chronic pain(due to falling off a ladder 30 years ago). Order obtained for Norco, and patient given 2 of them at 1009. While RN was giving patient Norco, patient began to c/o severe chest pain, and he was starting to perspire. NTG ordered prn and patient received 1 tab SL at 1012. By 1015, CP was receding and it was gone by 1017. ANNEI Franco notified immediately after NTG given. She ordered EKG and troponin level. Patient given 0.3 mg IV dilaudid(presumably as premed for flushing out of CBI catheter). She is in room now.

## 2017-12-20 NOTE — ED NOTES
Continuous bladder irrigation set up . New 22 Persian cc hagan placed. 25 cc balloon filled. Pt and wife bedside and updated on current plan. Pt is not complaining of new pain. Bloody output at this time with good flow , becoming lighter color. Will continue to monitor and assess.

## 2017-12-20 NOTE — PLAN OF CARE
Problem: Patient Care Overview  Goal: Plan of Care/Patient Progress Review  Outcome: No Change  D/i  Patient being tx'd to 6C.  Report given to Nina 6C RN.  Heparin gtt infusing at 850 unit'shr along with Magnesium which is almost complete.  Redraw for Mag will be in am.  Patient denies any chest pain or other discomfort.  Troponin level now 2.5.  CBI continues with urine now pinkish red.  Will continue with POC on 6C.

## 2017-12-21 NOTE — PROGRESS NOTES
Urology Daily Progress Note  12/21/2017    24 hour events/Subjective:     - 10/10 chest pain overnight with worsening ST depression on ECG; received nitro and metoprolol.  Now on NTG gtt; plan for nitro patch today   - No complaint this AM   - Remains on CBI; no clots    O:  Temp:  [97.7  F (36.5  C)-98.8  F (37.1  C)] 98.8  F (37.1  C)  Pulse:  [] 86  Heart Rate:  [78-90] 85  Resp:  [16-18] 16  BP: ()/(45-77) 102/56  SpO2:  [93 %-99 %] 96 %  General: Alert, interactive, & in NAD; resting in bed  Resp: Breathing is non-labored on RA  Cardiac: Extremities warm  : Muhammad in place with watermelon colored urine; no clots  Extremities: No LE edema or obvious joint abnormalities  Skin: Warm and dry, no jaundice or rash     Ins & Outs (24 hours/since MN)  UOP  CBI    Labs/Imaging  BMP    Recent Labs  Lab 12/21/17  0544 12/20/17  1115 12/20/17  0358 12/20/17  0045    134 133 133   POTASSIUM 4.3 4.6 4.5 4.0   CHLORIDE 107 102 100 98   YUAN 8.4* 8.3* 8.6 9.2   CO2 21 26 22 21   BUN 15 19 21 20   CR 1.15 1.10 1.19 1.16   * 184* 259* 254*     CBC    Recent Labs  Lab 12/21/17  0544 12/20/17  1657 12/20/17  1115 12/20/17  0358   WBC 14.2* 14.7* 15.1* 17.7*   RBC 3.00* 3.12* 3.23* 3.66*   HGB 8.4* 8.6* 9.0* 10.2*   HCT 26.6* 27.2* 28.0* 32.2*   MCV 89 87 87 88   MCH 28.0 27.6 27.9 27.9   MCHC 31.6 31.6 32.1 31.7   RDW 14.2 13.8 13.8 13.8    308 317 340     Assessment/Plan  86 year old male with PMH of CAD on lifelong plavix and recent TURBT with subsequent hematuria and clot retention, now s/p bedside irrigation and initiation of CBI.  NSTEMI & Trop elevation on HD #1 - started on heparin drip    PLAN:  Neuro/Pain: Continue current regimen  CV/PULM:   - Encourage IS, ambulation   #NSTEMI: tpop elevation on HD #1 - cardiology consulted, started on heparin gtt and full strength ASA; possible angio today vs tomorrow.  Continue nitro gtt --> to patch today  GI/FEN:    - NPO for now; if not planned cards  procedure today, will resume diet   - Electrolyte replacement PRN   - Continue mIVF  : Continue CBI; wean as able  Heme: Hgb slowly trending down; will continue to monitor.  ID: No active issues   Endocrine: No active issues   Activity: Up ad lissette  Ppx: SCDs, ambulation, IS  Dispo: 5B    Seen and examined with the chief resident. Will discuss with Dr. Argueta.    --    Sudhakar Montana MD  Urology Resident    6:50 AM, 12/21/2017       Contacting the Urology Team     Please use the following job codes to reach the Urology Team. Note that you must use an in house phone and that job codes cannot receive text pages.     On weekdays, dial [phone] (or star-star-star 777 on the new [employer] telephones) then [phone] to reach the Adult Urology resident or PA on call    On weekdays, dial 893 (or star-star-star 777 on the new [employer] telephones) then [phone] to reach the Pediatric Urology resident    On weeknights and weekends, dial 893 (or star-star-star 777 on the new [employer] telephones) then [phone] to reach the Urology resident on call (for both Adult and Pediatrics)

## 2017-12-21 NOTE — PROGRESS NOTES
Brief Cardiology Note    Overnight patient developed 10/10 substernal chest pressure with associated worsening ST depressions on EKG. He was given 3 SL nitro and metoprolol 5mg IV X 1 with resolution of chest pain and improvement of ST depressions on tele. Patient is apparently on a nitro patch at home which has not been restarted since admission. Will start IV nitro gtt overnight with plan to wean and start nitro patch tomorrow am. Consult note reports plan for cath on Friday, will make NPO after MN in the event that cath is needed sooner. Discussed plan with primary team.     Gladys Hensley MD  Cardiology Fellow  Pager [phone]

## 2017-12-21 NOTE — PROGRESS NOTES
Transfer from  arrived at 1750. Arrived via bed. Pt states he wasn't been out of bed all day. C/o being hungry and wants to eat. Order for NPO, (paged Urology and Cards for diet order). Denies pain. VSS. Tele NSR with PVCs. CBI running. Urine in hagan pink and clear. R PIV infusing Mag replacement and Hep gtt at 850units/hour. 10xa level recheck at 2200. Wife at bedside asking for food for pt.

## 2017-12-21 NOTE — PROGRESS NOTES
"Urology Brief Note    Received page that patient was again having sternal pain similar to the pain he experienced earlier this morning. He received nitroglycerin x2 with no change in his chest pain. He is otherwise feeling well with no complaint. Per patient report, he is typically on a nitroglycerin patch at home although he has not had this ordered for him here since his admission last night (reported as \"not taking\" in his admission med reconciliation from 12/11, but patient and wife both confirm that he typically wears this patch at home).    He is vitally stable on room air with HR 90, /65. A repeat EKG was obtained which again showed ST depression similar to this morning's EKG. The cardiology fellow on-call was paged; she was already on the patient's floor and immediately examined the patient and reviewed his EKG. Per cardiology recommendations, a nitroglycerin drip was started for angina management overnight (in place of patient's home nitro patch) as well as metoprolol 5mg IV once with prompt resolution of his chest pain. Recommended continuing nitro drip overnight with plans to wean and replace home nitro patch tomorrow morning. Will discuss timing of angiogram (tomorrow vs Friday) with daytime team; NPO order placed for midnight in case of possible procedure tomorrow.     --    Parish Foley MD   Urology PGY4  Pager: [phone]    9:12 PM, 12/20/2017       Contacting the Urology Team     Please use the following job codes to reach the Urology Team. Note that you must use an in house phone and that job codes cannot receive text pages.     On weekdays, dial [phone] (or star-star-star 777 on the new [employer] telephones) then [phone] to reach the Adult Urology resident or PA on call    On weekdays, dial [phone] (or star-star-star [phone] on the new [employer] telephones) then [phone] to reach the Pediatric Urology resident    On weeknights and weekends, dial [phone] (or star-star-star 777 on the new [employer] telephones) then [phone] to " reach the Urology resident on call (for both Adult and Pediatrics)

## 2017-12-21 NOTE — PROGRESS NOTES
"Cardiology Progress Note  December 21, 2017      Subjective:     Patient developed 10/10 CP overnight with worsening ST depressions on EKG. CP improved and ST depressions improved with Nitro SL x3 and 5 IV metoprolol, but given persistent nature, Pt was started on Nitro gtt and made NPO in case of angiogram today. Pt reports ongoing current CP while on nitro gtt, but more stable and similar to his usual baseline pain. Pt denies SOB, lightheadedness, or palpitations currently, but does endorse mild HA from the nitro. Pt is upset about being NPO again, but understands the reason why, and agrees to proceed with the angiogram today. Remainder of 4 point ROS reviewed and negative except as above.      Objective:   Vitals: /54 (BP Location: Right arm)  Pulse 86  Temp 98.9  F (37.2  C) (Oral)  Resp 16  Ht 1.727 m (5' 8\")  Wt 69.1 kg (152 lb 6.4 oz)  SpO2 95%  BMI 23.17 kg/m2     Intake/Output Summary (Last 24 hours) at 12/21/17 0757  Last data filed at 12/21/17 0659   Gross per 24 hour   Intake          2833.77 ml   Output            65005 ml   Net        -71488.23 ml     Vitals:    12/20/17 0438 12/21/17 0600   Weight: 70.3 kg (155 lb) 69.1 kg (152 lb 6.4 oz)     Physical Exam:  Gen: alert, interactive, NAD, resting comfortably in bed   Lungs: CTAB, no wheezes or crackles  CV: RRR, S1S2,Reg, no M/R/G noted. No JVD.   Abd: soft, ND, ND  Ext: warm and well perfused, no pedal edema    Diagnostics:  Lab Results   Component Value Date    TROPI 1.667 () 12/20/2017    TROPI 2.131 () 12/20/2017    TROPI 2.570 () 12/20/2017    TROPI 1.489 () 12/20/2017    TROPI 0.448 () 09/24/2015    TROPONIN 0.01 03/16/2014     Tele: sinus rhythm    EKG: NSR with ST depressions in V3-V6    Meds per EPIC EMR:    DULoxetine  20 mg Oral BID     evolocumab  140 mg Subcutaneous Q14 Days     isosorbide mononitrate  30 mg Oral Daily     metoprolol  50 mg Oral BID     metFORMIN  1,000 mg Oral BID w/meals     levothyroxine  25 mcg " "Oral Daily     aspirin  325 mg Oral Daily       Assessment and Recommendation:     Bora Metzger is a 86 year old man with a history of NSTEMI (2015), CAD s/p CABG x3 in 2004 (LIMA to LAD, SVG to RCA, SVG to 1st diag), Afib, HTN, T2DM, HLD, bladder CA s/p TURB-T (path benign) with indweling Muhammad and prostate CA, presenting with hematuria, Muhammad thrombosis, suprapubic pain and acute urinary obstruction, with EKG changes and troponin elevation concerning for NSTEMI. Repeat EKG showed resolution of ischemic changes, though troponin uptrending.     # NSTEMI  # Hx of CAD s/p 3v CABG in 2004 (LIMA-LAD, SVG-RCA, SVG-1st Diag)  - NPO since MN for coronary angiogram today, and Pt is on the schedule today, d/c metformin for the procedure, this will need to be help for at least 48 hours following the angiogram, unclear what type of insulin caused \"anaphylaxis in the past,\" for now blood sugars are controlled  - Premeds with steroids and benadryl for prior rash from contrast, has been getting NS @100/hr since 12/20  - Continue heparin gtt  - Continue nitro gtt and titrate to CP control  - Continue with ASA 81 mg PO Daily  - Continue metoprolol tartrate 50 mg PO BID  - Continue lisinopril 2.5 mg PO Daily    This patient was discussed with Dr. Mendes and the note below reflects our joint plan.    Thank you for consulting the cardiovascular services at the [facility]. Please do not hesitate to call with questions or concerns.     Deneen Briscoe MD  PGY-3, Internal Medicine  Pager [phone] or [phone]      I very much appreciated the opportunity to see and assess Mr Bora Metzger in the hospital with CV Fellow Dr Smith and Cards Consult resident robert. We discussed role of cardiac angio and risks of bleeding from bladder if stents/plavix needed. He voiced understanding. Will proceed with angio as scheduled.    I agree with the note above which  summarizes my findings and current " recommendations.  Please do not hesitate to contact my office if you have any questions or concerns.      Mikie Mendes MD  Cardiac Arrhythmia Service  [facility]  [phone]

## 2017-12-21 NOTE — PROVIDER NOTIFICATION
Pt endorsed CP around 2030 that radiated from left substernal through neck and into upper back. Nitro SL x 2 given, with no relief of pain. Waited for Norco to take effect (as patient states he has chronic neck and back pain), but still did not get better. MD notified, and given orders for another Nitro SL x 1 and IV Metoprolol 5 mg. Both were given, and relief of CP occurred after IV Metoprolol. Pt also started on nitro gtt @ 0.07 mcg/kg/min. Pt currently does not have any CP, will continue to monitor.

## 2017-12-21 NOTE — PLAN OF CARE
"Problem: Patient Care Overview  Goal: Plan of Care/Patient Progress Review  /56 (BP Location: Right arm)  Pulse 86  Temp 98.8  F (37.1  C) (Oral)  Resp 16  Ht 1.727 m (5' 8\")  Wt 70.3 kg (155 lb)  SpO2 96%  BMI 23.57 kg/m2    Pt admitted on 12/20 for hematuria, CP, and hypotension. Pt AOx4, VSS on RA. Heparin gtt running @ 700 units/hour, pending 10a result. Pt endorsed CP overnight, was started on nitro gtt. Nitro gtt currently running @ 0.17 mcg/kg/min, BP's stable and within parameters. R PIV also infusing NS at 100 mL/hr. Pt currently NPO for possible angiogram later today. Left neck pain controlled with PRN Norco. CBI currently running, output light pink and clear, +BM during shift. Pt A x 1 with transfers. Wife stayed with pt overnight per OK from [facility].    Will continue to monitor and notify Urology (primary)/Cards (consulting) of any pertinent changes.        "

## 2017-12-22 LAB
BACTERIA SPEC CULT: ABNORMAL
INTERPRETATION ECG - MUSE: NORMAL
INTERPRETATION ECG - MUSE: NORMAL
SPECIMEN SOURCE: ABNORMAL

## 2017-12-22 ASSESSMENT — ACTIVITIES OF DAILY LIVING (ADL): ADLS_ACUITY_SCORE: 11

## 2017-12-22 NOTE — PROGRESS NOTES
Patient arrived from cath lab at 1840 to 6B. Patient was alert only to self, combative. BP's hypotensive. HR tachy. On 3 L O2. Urology and cardiology contacted and came to room. Gave patient 0.2 mg of dilaudid for pain. BP's continued to be low. EKG and labs were done. Dopamine was started. Patient had PEA arrest. Code blue called. Code ran for 40 minutes (see paper chart). Wife here and other family coming.

## 2017-12-22 NOTE — DISCHARGE SUMMARY
Discharge Summary    Bora Metzger MRN# [id number]   YOB: 1931 Age: 86 year old     Date of Admission:  12/20/2017  Date of Discharge::  12/22/2017  1:14 AM  Admitting Physician:  Sil Rose MD  Discharge Physician:  Ki Montana MD  Primary Care Physician:         Bora Hardwick          Admission Diagnoses:   Bladder outlet obstruction [N32.0]  Elevated troponin [R74.8]          Discharge Diagnosis:   Bladder outlet obstruction [N32.0]  Elevated troponin [R74.8]  PEA arrest         Procedures:   None        Non-operative procedures:   Cardiac catheterization          Consultations:   CARDIOLOGY GENERAL ADULT IP CONSULT  VASCULAR ACCESS CARE ADULT IP CONSULT  VASCULAR ACCESS CARE ADULT IP CONSULT  VASCULAR ACCESS CARE ADULT IP CONSULT         Imaging Studies:   No studies require specific follow-up  Results for orders placed or performed during the hospital encounter of 06/06/17   US Carotid Bilateral    Narrative    US CAROTID BILATERAL 6/6/2017 1:46 PM    HISTORY: 86-year-old male status post right carotid endarterectomy on  3/12/2009.    COMPARISON: Carotid ultrasound dated 6/9/2015    FINDINGS:     Right side:   On the grayscale images, scattered plaque is identified in the distal  common carotid artery extending into the internal carotid artery.  Spectral waveform analysis was performed. Peak systolic and diastolic  velocities in the right internal carotid artery are 104 and 31 cm/s  versus 95 and 26 cm/s on the prior exam. Per sonographic criteria,  degree of stenosis in the right internal carotid artery is 16-49%.  Flow in the right vertebral artery is antegrade.     Left side:   On the grayscale images, shadowing plaque is identified at the carotid  bifurcation extending into the internal and external carotid arteries.  Shadowing from the plaque obscures portions of the internal carotid  artery.  Spectral waveform analysis was performed. Peak systolic and diastolic    velocities in the left internal carotid artery are 245 and 65 cm/s  versus 173 and 36 cm/s on the prior exam. Per sonographic criteria,  degree of stenosis in the left internal carotid artery is 50-79%.  Flow in the left vertebral artery is antegrade.       Impression    IMPRESSION: Per sonographic criteria, there is a 16-49% stenosis in  the right internal carotid artery and a 50-79% stenosis in the left  internal carotid artery.    Degree of stenosis measured relative to the diameter of the normal  internal carotid artery per NASCET or NASCET type criteria    SRUTHI FORDE MD            Medications Prior to Admission:     No prescriptions prior to admission.            Discharge Medications:     Discharge Medication List as of 12/22/2017  1:16 AM      CONTINUE these medications which have NOT CHANGED    Details   opium-belladonna (B&O SUPPRETTES) 30-16.2 MG per suppository Place 1 suppository rectally 2 times daily as needed for moderate pain or bladder spasms, Disp-10 suppository, R-0, Local Print      acetaminophen (TYLENOL) 325 MG tablet Take 1-2 tablets (325-650 mg) by mouth every 6 hours as needed for other (mild pain), Disp-30 tablet, R-0, E-Prescribe      senna-docusate (SENOKOT-S;PERICOLACE) 8.6-50 MG per tablet Take 1-2 tablets by mouth 2 times daily To prevent constipation while taking narcotic pain medication. Start with 1 tablet twice daily. If no bowel movement in 24 hours, increase to 2 tablets twice daily.  Discontinue if you have loose stools or when you  are no longer taking narcotics., Disp-30 tablet, R-0, Local Print      tolterodine (DETROL LA) 4 MG 24 hr capsule Take 1 capsule (4 mg) by mouth daily as needed For bladder spasm/pain, Disp-2 capsule, R-0, E-Prescribe      !! HYDROcodone-acetaminophen (NORCO) 5-325 MG per tablet Take 1-2 tablets by mouth every 6 hours as needed for moderate to severe pain (Do NOT drive or use additional narcotics/tylenol while on this medication. Narcotics  can cause constipation so please use theprescribed stool softener while on this medication .), Disp-21 tablet, R-0, Local Print      lisinopril (PRINIVIL/ZESTRIL) 2.5 MG tablet TAKE ONE TABLET BY MOUTH ONCE DAILY IN THE EVENING, Disp-90 tablet, R-0, E-PrescribeLast refill needs visit and labs      clopidogrel (PLAVIX) 75 MG tablet Take 1 tablet (75 mg) by mouth daily, Disp-90 tablet, R-2, E-Prescribe      RESTASIS 0.05 % ophthalmic emulsion Place 1 drop into both eyes 2 times daily , SAMUEL, Historical      DULoxetine (CYMBALTA) 20 MG EC capsule TAKE ONE CAPSULE BY MOUTH TWICE DAILY, Disp-180 capsule, R-1, E-Prescribe      !! HYDROcodone-acetaminophen (NORCO) 5-325 MG per tablet Take 1 tablet by mouth every 6 hours as needed for pain #90 for three months supply.  Do not take with tramadol. (Patient only takes when tramadol does not work), Disp-90 tablet, R-0, Local Print      traMADol-acetaminophen (ULTRACET) 37.5-325 MG per tablet Two every morning and two at dinnertime as needed , discussed/knows not to use with vicodin since both had acetaminophen and would be over-sedating., Disp-120 tablet, R-2, Local Print      metFORMIN (GLUCOPHAGE-XR) 500 MG 24 hr tablet Take 2 tablets (1,000 mg) by mouth 2 times daily (with meals), Disp-360 tablet, R-1, E-Prescribe      LORazepam (ATIVAN) 0.5 MG tablet Take 1-2 tablets (0.5-1 mg) by mouth nightly as needed Take 1-2 tabs by mouth as needed for anxiety and or sleeping-, Disp-60 tablet, R-5, Local Print      tiZANidine (ZANAFLEX) 2 MG tablet Take 0.5-1 tablets (1-2 mg) by mouth 2 times daily as needed for muscle spasms, Disp-30 tablet, R-1, E-Prescribe      isosorbide mononitrate (IMDUR) 30 MG 24 hr tablet 1-2 tabs daily, Historical      metoprolol (LOPRESSOR) 50 MG tablet Take 1 tablet (50 mg) by mouth 2 times daily, Disp-180 tablet, R-1, E-Prescribe      levothyroxine (SYNTHROID/LEVOTHROID) 25 MCG tablet Take 1 tablet (25 mcg) by mouth daily, Disp-90 tablet, R-2, E-Prescribe       evolocumab (REPATHA) 140 MG/ML prefilled autoinjector Inject 1 mL (140 mg) Subcutaneous every 14 days, Disp-2 each, R-12, E-Prescribe      nitroglycerin (NITRODUR) 0.4 MG/HR 24 hr patch Place 1 patch onto the skin daily, Disp-30 patch, R-12, E-Prescribe      lidocaine (LIDODERM) 5 % patch Place 1 patch onto the skin as needed Over painful areasDisp-30 patch, [id number]      nitroglycerin (NITROSTAT) 0.4 MG SL tablet 1 TAB EVERY 5 MIN AS NEEDED, UP TO 3 PER EPISODE - Pt may  4 bottles of 25 at a time, Disp-100 tablet, R-3, E-Prescribe      lidocaine (XYLOCAINE) 5 % ointment Apply topically as needed for moderate painDisp-50 g, [id number]      !! blood glucose monitoring (ACCU-CHEK SMILEY PLUS) test strip Use to test blood sugar 1 times daily or as directed.Disp-100 each, [id number]      blood glucose monitoring (SOFTCLIX) lancets Use to test blood sugar 1 times daily or as directed., Disp-1 Box, R-11, E-PrescribeProfile Rx: patient will contact pharmacy when needed      levocetirizine (XYZAL) 5 MG tablet Take 1 tablet (5 mg) by mouth every evening, Disp-90 tablet, R-2, E-Prescribe      hydrocortisone (ANUSOL-HC) 2.5 % rectal cream Place rectally 2 times daily as needed for hemorrhoids Do not apply for more that two weeks at a timeDisp-30 g, [id number]      COENZYME Q-10 PO Take 30 mg by mouth daily , Historical      aspirin 81 MG tablet Take 1 tablet (81 mg) by mouth daily, Disp-100 tablet, R-1, Local Print      blood glucose monitoring (ACCU-CHEK SMILEY PLUS) meter device kit Use to test blood sugar 1 times daily or as directed.Disp-1 kit, [id number]      !! glucose blood VI test strips strip 1 strip by In Vitro route daily Comfort curve test stripesDisp-100 each, [id number] Rx: patient will contact pharmacy when needed      multivitamin, therapeutic with minerals (MULTI-VITAMIN) TABS Take 1 tablet by mouth daily, Historical       !! - Potential duplicate medications found.  Please discuss with provider.                Medications Discontinued or Adjusted During This Hospitalization:   No change         Antibiotics Prescribed at Discharge:   None prescribed          Brief History of Illness:   Per admission history & physical, 'Bora Metzger is a 86 year old male with complex PMH most pertinent for afib and CAD with history of MI on lifelong plavix and urologic hx of prostate cancer s/p radiation ~10y ago, radiation cystitis, and recurrent BCG-refractory NMIBC who recently underwent a TURBT with Dr. Argueta on 12/8/2017 (surgical pathology returned as benign urothelial tissue). Plavix was held 7 days prior to his procedure but he developed recurrent hematuria with clot retention post op requiring hospital admission at [facility] earlier this week with initiation of CBI by urology. However, patient demanded to leave yesterday in order to see Dr. Argueta for his scheduled clinic appointment on Wednesday morning. CBI was thus stopped and patient was discharged home but he developed clot retention and significant suprapubic pain soon after. He thus presented to our ED for further management. At bedside he denies f/c/n/v and complains of only suprapubic pain and retention.'    Of note, the patient was instructed following his cystoscopy with Dr. Argueta to resume Plavix 7 days postoperatively.  His wife noted during his hospitalization that he had been off Plavix since 7 days prior to surgery.           Hospital Course:   Following evaluation of the emergency department, the patient was admitted to the urology service.  He was maintained on continuous bladder irrigation.  On hospital day #1, the patient was noted to have chest pain.  EKG and troponin were obtained.  His troponin was elevated to 1.48 in the cardiology service was consulted.  The patient was started on a heparin drip and transferred to the cardiology wards.  On hospital day #2, the patient was taken to the cardiac  catheterization lab.  (Please see separately dictated procedure note).  No stents were placed at that time.  Upon return to the wards, the patient was evaluated by both the urology and cardiology services for asymptomatic hypotension.  Will hold both services were in the room, the patient became unresponsive.  A CODE BLUE was called and resuscitation efforts were initiated.  (Please see separately dictated code notes).  The patient  at 21: 41.         Final Pathology Result:   No pathology submitted         Discharge Instructions and Follow-Up:   Discharge diet: N/A   Discharge activity: N/A   Discharge follow-up: N/A   Tubes/Lines/Drains: None   Wound care: N/A          Home Health Care:   N/A           Discharge Disposition:   Discharged to [facility]      Condition at discharge:     --    Sudhakar Montana MD  Urology Resident  pgr: [phone]    8:19 AM, 2017

## 2017-12-22 NOTE — PROGRESS NOTES
MD DEATH PRONOUNCEMENT     Physical Exam: Unresponsive to noxious stimuli, no spontaneous respirations. Breath and heart sounds absent, no pupillary light reflex or corneal reflex. No pulses present.    Patient was pronounced dead at: 2017  Time of death:     Active Problems:   Past Medical History:   Diagnosis Date     Acute myocardial infarction, unspecified site, episode of care unspecified      Anxiety      Arrhythmia     PAC, PVC     Arthritis      Atrial fibrillation (H)      Bilateral claudication of lower limb (H)      Bladder cancer (H)      Carotid artery disease (H)     right carotid endarterectomy 2009     Chronic pain syndrome      Coronary artery disease     CVS rec lifelong plavix due to diffuse CAD     ED (erectile dysfunction)     since surgery and XRT     Epididymitis      Essential hypertension, benign      Herpes zoster without mention of complication 2007     History of radiation therapy     prostate,  (37 treatments)     Hypothyroidism      Malignant neoplasm of prostate (H)      NSTEMI (non-ST elevated myocardial infarction) (H) 2015 Heart cath - severe native vessel CAD, patent LIMA to LAD and SVG to RCA, occluded SVG to 1st diagonal (likely chronic)     Osteoporosis      Other and unspecified hyperlipidemia     statin intolerance     Postsurgical aortocoronary bypass status 2004    [place]ad, VG-RI, VG-RCA (cath  grafts open, only potential ischemia prox Lad before [place])     Stented coronary artery     prior to CAB: Lad, RI, brachyRx     Type II or unspecified type diabetes mellitus without mention of complication, not stated as uncontrolled      Unspecified hearing loss        Please consider an autopsy if any of the following exist:   NO  Unexpected or unexplained death during or following any dental, medical, or surgical diagnostic treatment procedures.    NO  Death of mother at or up to seven days after delivery.    NO  All  and pediatric  deaths.    NO  Death where the cause is sufficiently obscure to delay completion of the death certificate.    NO  Deaths in which autopsy would confirm a suspected illness/condition that would affect surviving family members or recipients of transplanted organs.      The following deaths must be reported to the 's Office:   NO  A death that may be due entirely or in part to any factors other than natural disease (recent surgery, recent trauma, suspected abuse/neglect).    NO  A death that may be an accident, suicide, or homicide.    NO  Any sudden, unexpected death in which there is no prior history of significant heart disease or any other condition associated with sudden death.    NO  Any death which is apparently due to natural causes but in which the  does not have a personal physician familiar with the patient s medical history, social, or environmental situation or the circumstances of the terminal event.    NO  Any death apparently due to Sudden Infant Death Syndrome.    NO  Deaths that occur during, in association with, or as consequences of a diagnostic, therapeutic, or anesthetic procedure.    NO  Any death in which a fracture of a major bone has occurred within the past (6) six months.    NO  Any death in which the  was an inmate of a public institution or was in the custody of Law Enforcement personnel.      Disposition: Autopsy was discussed with patient's wife.  She declined autopsy.    Ki Tomas   PGY-2  [phone]

## 2017-12-22 NOTE — PROCEDURES
PRELIMINARY CARDIAC CATH REPORT:     PROCEDURES PERFORMED:   Coronary Angiography    PHYSICIANS:  Attending Physician: Kj Aguirre MD  Interventional Cardiology Fellow: Epifanio Naik MD  Cardiology Fellow: Bud Ye MD     INDICATION:  Bora Metzger is a 86 year old man with a history of NSTEMI (2015), CAD s/p CABG x3 in 2004 (LIMA to LAD, SVG to RCA, SVG to 1st diag), Afib, HTN, T2DM, HLD, bladder CA s/p TURB-T (path benign) with indweling Muhammad and prostate CA who was found to have elevated troponin and EKG changes. Patient was referred for coronary angiogram.     DESCRIPTION:  1. Consent obtained with discussion of risks.  All questions were answered.  2. Sterile prep and procedure.  3. Location with Sheaths:   Rt Femoral Arterial  4 Fr 10 cm [short]  4. Access: Local anesthetic with lidocaine.  A standard 18 guage needle with ultrasound guidance was used to establish vascular access using a modified Seldinger technique.  5. Diagnostic Catheters:   4 Fr  JR4, JL 4 and JUSTINO  6. Guiding Catheters:  None  6. Estimated blood loss: < 25 ml    MEDICATIONS:  The procedure was performed under conscious sedation for 15 minutes from 1754 to 1809.  The patient was assessed immediately before the first sedation medication was administered.  Midazolam 0 mg and Fentanyl 25 mcg were administered.  Heart rate, BP, respiration, oxygen saturation and patient responses were monitored throughout the procedure with the assistance of the RN under my supervision.  Procedures:    CORONARY ANGIOGRAM:   1. Both coronary arteries arise from their respective cusps.  2. Dominance: Right  3. The LM is without angiographic evidence of disease.   4. LAD is chronically occluded.   5. LCX gives rise to small OM1, large OM2, and multiple PL branches.  The pLCx has a 70% stenosis, mLCx has a <25% stenosis, dLCx has a 80-90*% stenosis (small vessel), OM1 has diffuse disease and OM2 has a 30% osital stenosis.    6. RCA is chronically  occluded.     CORONARY ARTERY BYPASS ANGIOGRAPHY:  Coronary bypass angiography was performed on SVG to Diagonal, SVG to RCA, and LIMA to LAD.     #1: SVG to Diagonal: Vein graft is occluded with contrast dye in the lumen suggesting small amount of thrombus.     #2: SVG to RCA: Vein graft is patent and fills PDA with retrograde filling of PL branches. There is mild disease in the PL and PDA system.     #3: LIMA to LAD: Widely patent with good anastomosis at the distal LAD with retrograde filling of the mid LAD. There is diffuse disease in the apical LAD after the LIMA anastomosis . There is also diffuse disease in the mid LAD.     Sheath Removal:  The arterial sheath was manually removed in the cardiac catheterization laboratory.    Contrast: Isovue, 45 ml     Fluoroscopy Time: 4.1 min    COMPLICATIONS:  1. None    SUMMARY:   Diffuse Coronary Artery Disease in both native circulation and bypass grafts.     In the native circulation, the left circumflex is unprotected and has 70% stenosis in the proximal segment. Regarding the bypass grafts, there appears to be small amount of thrombus in the SVG to Diagonal graft.     However, given that patient is requiring blood transfusions from presumed bladder cancer coupled downtrending troponin, decision was made to treat lesions medically and without percutaneous intervention.     PLAN:   >> ASA 81 mg qd  >> Bedrest per protocol.  >> Continued medical management and lifestyle modification for cardiovascular risk factor optimization.   >>. Return to the primary inpatient team for further evaluation and management.    The attending interventional cardiologist was present and supervised all critical aspects the procedure.    See CVIS report for final draft.    Bud Ye MD   Cardiology Fellow    Kj Aguirre MD  Cardiology Staff

## 2017-12-22 NOTE — PROGRESS NOTES
Urology Note    Was paged by the patient's bedside RN after he had returned from the cardiac cath lab with complaints of agitation, confusion, and some shoulder pain.  I went and evaluated the patient.  Upon my arrival, the patient was supine in his hospital bed.  His wife and bedside nurse were occasionally having to hold his arms down from pulling at lines are trying to get up.  We gave him 0.2 mg of Dilaudid for pain control.  This improved his pain only slightly.  Patient was noted during this time to have softer blood pressures than previously in the day.  Blood pressures were in the 80s-90s over 40s-50s.  A 250 mL bolus was administered, an EKG was ordered, and hemoglobin was pending at that time.  There was no response in blood pressure, and in fact, the patient's pressures trended down to 70s over 40s.  Due to the decreased blood pressures and concern that we would be unable to aggressively fluid resuscitate based on the patient's cardiac history, the cardiology service was contacted.  They came promptly to the bedside and performed a bedside echo with both the fellow and resident.    For the next 40 or so minutes, the patient was observed with the cardiology service in the room.  His pressures continued to be low.  He still endorsed no chest pain.  The plan at that time was to transfer the patient to the cardiac ICU for closer hemodynamic monitoring and blood pressure support.  I notified urology staff surgeon Dr. Argueta of the plan to transfer to the ICU.  He was in agreement.    Prior to transfer, the patient came unresponsive.  A CODE BLUE was called and chest compressions were initiated.  Again at this time, I notified Dr. Argueta, who headed into the hospital.  Please see separately dictated code note.  The code was stopped at 21:41 and the patient had .  The room was cleaned as was the patient to allow his wife to come and sit at the bedside.  Dr. Argueta arrived and we chatted with the patient's  wife and family.  The patient's wife had previously declined an autopsy to the cardiology team who had run the code.    I was notified as above and came to the hospital.  By the time I arrived, patient had  as the code had been concluded.  I described the issues and our efforts to the patients wife and family.  They reiterated the fact that this was not surprising given his ill health and they were understanding of our efforts    Patient seen and examined with the resident.    I agree with the resident's note.       Blanca Argueta MD  Urology Staff    --    Sudhakar Montana MD  Urology Resident    8:09 PM, 2017

## 2017-12-22 NOTE — SUMMARY OF CARE
pts wife and family left and took pts belongings with them. Muhammad/IO's/IV's removed. Security notified, pending transfer to [facility].

## 2017-12-22 NOTE — PROGRESS NOTES
Code Blue Note:    Code blue called for PEA arrest.  Initiated CPR and attempted to obtain ROSC for approximately 50 minutes.  ROSC was briefly achieved several times but could not be sustained; patient did convert to VT at times and required multiple shocks, epinephrine, amiodarone and pressor support.  Wife was present outside room throughout the code and was updated.  Code was called at 2141 and patient's family declined autopsy.  Please see death note for exam.    Ki Tomas MD  Internal Medicine PGY-2  [phone]

## 2017-12-22 NOTE — PLAN OF CARE
Problem: Patient Care Overview  Goal: Plan of Care/Patient Progress Review  Pt AOx4 throughout the shift. CBI continues with NS running full out. Bags last about 20-30 minutes. Urology flushed bladder to remove clots. After 5 minutes of bags being empty once again had to be flushed by RN  to remove clot. Need to keep fluid running to prevent clots. Norco x 1 for pain. Heparin reduced to 400 from 700 and held 1 hour  for 10A at 0.76. Nitro increased twice during day to be at 0.37 mcg/kg/min with some relief. Pt sent to cath lab at 1720. Pt transferred to 6B post cath. Will continue with care plan, continue to monitor and notify MD's of any changes.

## 2017-12-27 ENCOUNTER — TELEPHONE (OUTPATIENT)
Dept: CARDIOLOGY | Facility: CLINIC | Age: 82
End: 2017-12-27

## 2017-12-27 NOTE — TELEPHONE ENCOUNTER
RN called patient's wife to discuss returning Repatha after the death of her . Patient's wife was distressed and voiced her sadness over his sudden death of her . She was crying on and off while talking about the events leading up to his death. RN provided support throughout the conversation. At the end of the call informed her she can discard the Repatha, that it is not necessary to return unopened packages and RN cancelled the shipments of Repatha to patient. RN asked wife to call with any questions or concerns.

## 2017-12-28 ENCOUNTER — TELEPHONE (OUTPATIENT)
Dept: FAMILY MEDICINE | Facility: CLINIC | Age: 82
End: 2017-12-28

## 2017-12-28 NOTE — TELEPHONE ENCOUNTER
[facility]

## 2017-12-29 LAB
CA-I BLD-SCNC: 3.6 MG/DL (ref 4.4–5.2)
CO2 BLD-SCNC: 7 MMOL/L (ref 21–28)
GLUCOSE BLD-MCNC: 138 MG/DL (ref 70–99)
HCT VFR BLD CALC: <15 %PCV (ref 40–53)
HGB BLD CALC-MCNC: ABNORMAL G/DL (ref 13.3–17.7)
PCO2 BLD: 39 MM HG (ref 35–45)
PH BLD: 6.89 PH (ref 7.35–7.45)
PO2 BLD: 30 MM HG (ref 80–105)
POTASSIUM BLD-SCNC: 3.2 MMOL/L (ref 3.4–5.3)
SAO2 % BLDA FROM PO2: 27 % (ref 92–100)
SODIUM BLD-SCNC: 147 MMOL/L (ref 133–144)

## (undated) DEVICE — LINEN TOWEL PACK X5 5464

## (undated) DEVICE — EVACUATOR BLADDER UROVAC LATEX M0067301250

## (undated) DEVICE — SUCTION MANIFOLD DORNOCH ULTRA CART UL-CL500

## (undated) DEVICE — BAG URINARY DRAIN LUBRISIL IC 4000ML LF 253509A

## (undated) DEVICE — PACK CYSTO UMMC CUSTOM

## (undated) DEVICE — CATH URETERAL OPEN END 05FR 70CM 020015

## (undated) DEVICE — NDL BLUNT 18GA 1" W/O FILTER 305181

## (undated) DEVICE — BAG DRAINAGE URO CATCHER II 4-040-14-10

## (undated) DEVICE — PAD CHUX UNDERPAD 23X24" 7136

## (undated) DEVICE — CATH FOLEY 22FR 5ML LATEX 0165SI22

## (undated) DEVICE — PACK GOWN 3/PK DISP XL SBA32GPFCB

## (undated) DEVICE — SOL NACL 0.9% IRRIG 1000ML BOTTLE 2F7124

## (undated) DEVICE — DRSG TELFA 3X8" 1238

## (undated) DEVICE — CATH PLUG W/CAP 000076

## (undated) DEVICE — SOL NACL 0.9% IRRIG 3000ML BAG 2B7477

## (undated) DEVICE — GUIDEWIRE URO HIWIRE ANG STIFF .035"X150CM NITINOL  G30475

## (undated) DEVICE — ESU ELEC CUTTING LOOP 24/26FR .35MM BIPOLAR 27040GP1-S

## (undated) DEVICE — CONNECTOR WATER VALVE PERFUSION PACK STR 020272801

## (undated) RX ORDER — LIDOCAINE HYDROCHLORIDE 20 MG/ML
INJECTION, SOLUTION EPIDURAL; INFILTRATION; INTRACAUDAL; PERINEURAL
Status: DISPENSED
Start: 2017-01-01

## (undated) RX ORDER — PHENAZOPYRIDINE HYDROCHLORIDE 100 MG/1
TABLET, FILM COATED ORAL
Status: DISPENSED
Start: 2017-01-01

## (undated) RX ORDER — ONDANSETRON 2 MG/ML
INJECTION INTRAMUSCULAR; INTRAVENOUS
Status: DISPENSED
Start: 2017-01-01

## (undated) RX ORDER — FENTANYL CITRATE 50 UG/ML
INJECTION, SOLUTION INTRAMUSCULAR; INTRAVENOUS
Status: DISPENSED
Start: 2017-01-01

## (undated) RX ORDER — PHENYLEPHRINE HCL IN 0.9% NACL 1 MG/10 ML
SYRINGE (ML) INTRAVENOUS
Status: DISPENSED
Start: 2017-01-01

## (undated) RX ORDER — MAGNESIUM HYDROXIDE 1200 MG/15ML
LIQUID ORAL
Status: DISPENSED
Start: 2017-01-01

## (undated) RX ORDER — PROPOFOL 10 MG/ML
INJECTION, EMULSION INTRAVENOUS
Status: DISPENSED
Start: 2017-01-01

## (undated) RX ORDER — HYDROMORPHONE HCL/0.9% NACL/PF 0.2MG/0.2
SYRINGE (ML) INTRAVENOUS
Status: DISPENSED
Start: 2017-01-01

## (undated) RX ORDER — HYDROCODONE BITARTRATE AND ACETAMINOPHEN 5; 325 MG/1; MG/1
TABLET ORAL
Status: DISPENSED
Start: 2017-01-01

## (undated) RX ORDER — HYDROMORPHONE HYDROCHLORIDE 1 MG/ML
INJECTION, SOLUTION INTRAMUSCULAR; INTRAVENOUS; SUBCUTANEOUS
Status: DISPENSED
Start: 2017-01-01

## (undated) RX ORDER — NITROGLYCERIN 5 MG/ML
VIAL (ML) INTRAVENOUS
Status: DISPENSED
Start: 2017-01-01

## (undated) RX ORDER — EPHEDRINE SULFATE 50 MG/ML
INJECTION, SOLUTION INTRAMUSCULAR; INTRAVENOUS; SUBCUTANEOUS
Status: DISPENSED
Start: 2017-01-01

## (undated) RX ORDER — DIPHENHYDRAMINE HYDROCHLORIDE 50 MG/ML
INJECTION INTRAMUSCULAR; INTRAVENOUS
Status: DISPENSED
Start: 2017-01-01

## (undated) RX ORDER — CIPROFLOXACIN 500 MG/1
TABLET, FILM COATED ORAL
Status: DISPENSED
Start: 2017-01-01

## (undated) RX ORDER — HEPARIN SODIUM 1000 [USP'U]/ML
INJECTION, SOLUTION INTRAVENOUS; SUBCUTANEOUS
Status: DISPENSED
Start: 2017-01-01